# Patient Record
Sex: MALE | Race: WHITE | NOT HISPANIC OR LATINO | Employment: OTHER | URBAN - NONMETROPOLITAN AREA
[De-identification: names, ages, dates, MRNs, and addresses within clinical notes are randomized per-mention and may not be internally consistent; named-entity substitution may affect disease eponyms.]

---

## 2018-11-02 ENCOUNTER — APPOINTMENT (OUTPATIENT)
Dept: CT IMAGING | Facility: HOSPITAL | Age: 73
End: 2018-11-02

## 2018-11-02 ENCOUNTER — HOSPITAL ENCOUNTER (EMERGENCY)
Facility: HOSPITAL | Age: 73
Discharge: LEFT BEFORE DISCHARGE | End: 2018-11-02
Admitting: EMERGENCY MEDICINE

## 2018-11-02 ENCOUNTER — APPOINTMENT (OUTPATIENT)
Dept: GENERAL RADIOLOGY | Facility: HOSPITAL | Age: 73
End: 2018-11-02

## 2018-11-02 VITALS
WEIGHT: 210.6 LBS | BODY MASS INDEX: 33.06 KG/M2 | TEMPERATURE: 98.2 F | OXYGEN SATURATION: 92 % | DIASTOLIC BLOOD PRESSURE: 89 MMHG | RESPIRATION RATE: 16 BRPM | SYSTOLIC BLOOD PRESSURE: 149 MMHG | HEIGHT: 67 IN | HEART RATE: 100 BPM

## 2018-11-02 DIAGNOSIS — J18.9 PNEUMONIA OF BOTH LUNGS DUE TO INFECTIOUS ORGANISM, UNSPECIFIED PART OF LUNG: ICD-10-CM

## 2018-11-02 DIAGNOSIS — N20.1 URETEROLITHIASIS: Primary | ICD-10-CM

## 2018-11-02 LAB
ALBUMIN SERPL-MCNC: 3.7 G/DL (ref 3.5–5)
ALBUMIN/GLOB SERPL: 1.3 G/DL (ref 1.1–2.5)
ALP SERPL-CCNC: 87 U/L (ref 24–120)
ALT SERPL W P-5'-P-CCNC: <15 U/L (ref 0–54)
ANION GAP SERPL CALCULATED.3IONS-SCNC: 15 MMOL/L (ref 4–13)
AST SERPL-CCNC: 19 U/L (ref 7–45)
ATMOSPHERIC PRESS: 750 MMHG
BACTERIA UR QL AUTO: ABNORMAL /HPF
BASE EXCESS BLDV CALC-SCNC: -1.1 MMOL/L (ref 0–2)
BASOPHILS # BLD AUTO: 0.06 10*3/MM3 (ref 0–0.2)
BASOPHILS NFR BLD AUTO: 0.7 % (ref 0–2)
BDY SITE: ABNORMAL
BILIRUB SERPL-MCNC: 0.5 MG/DL (ref 0.1–1)
BILIRUB UR QL STRIP: NEGATIVE
BODY TEMPERATURE: 37 C
BUN BLD-MCNC: 23 MG/DL (ref 5–21)
BUN/CREAT SERPL: 15.1 (ref 7–25)
CALCIUM SPEC-SCNC: 9.2 MG/DL (ref 8.4–10.4)
CHLORIDE SERPL-SCNC: 102 MMOL/L (ref 98–110)
CLARITY UR: CLEAR
CO2 SERPL-SCNC: 23 MMOL/L (ref 24–31)
COLOR UR: YELLOW
CREAT BLD-MCNC: 1.52 MG/DL (ref 0.5–1.4)
DEPRECATED RDW RBC AUTO: 40.7 FL (ref 40–54)
EOSINOPHIL # BLD AUTO: 0.3 10*3/MM3 (ref 0–0.7)
EOSINOPHIL NFR BLD AUTO: 3.4 % (ref 0–4)
ERYTHROCYTE [DISTWIDTH] IN BLOOD BY AUTOMATED COUNT: 12.9 % (ref 12–15)
GFR SERPL CREATININE-BSD FRML MDRD: 45 ML/MIN/1.73
GLOBULIN UR ELPH-MCNC: 2.9 GM/DL
GLUCOSE BLD-MCNC: 296 MG/DL (ref 70–100)
GLUCOSE UR STRIP-MCNC: ABNORMAL MG/DL
HCO3 BLDV-SCNC: 24.4 MMOL/L (ref 22–28)
HCT VFR BLD AUTO: 39.9 % (ref 40–52)
HGB BLD-MCNC: 14.1 G/DL (ref 14–18)
HGB UR QL STRIP.AUTO: ABNORMAL
HOLD SPECIMEN: NORMAL
HOLD SPECIMEN: NORMAL
HYALINE CASTS UR QL AUTO: ABNORMAL /LPF
IMM GRANULOCYTES # BLD: 0.05 10*3/MM3 (ref 0–0.03)
IMM GRANULOCYTES NFR BLD: 0.6 % (ref 0–5)
KETONES UR QL STRIP: ABNORMAL
LEUKOCYTE ESTERASE UR QL STRIP.AUTO: ABNORMAL
LIPASE SERPL-CCNC: 57 U/L (ref 23–203)
LYMPHOCYTES # BLD AUTO: 0.85 10*3/MM3 (ref 0.72–4.86)
LYMPHOCYTES NFR BLD AUTO: 9.5 % (ref 15–45)
Lab: ABNORMAL
MCH RBC QN AUTO: 30.7 PG (ref 28–32)
MCHC RBC AUTO-ENTMCNC: 35.3 G/DL (ref 33–36)
MCV RBC AUTO: 86.9 FL (ref 82–95)
MODALITY: ABNORMAL
MONOCYTES # BLD AUTO: 0.65 10*3/MM3 (ref 0.19–1.3)
MONOCYTES NFR BLD AUTO: 7.3 % (ref 4–12)
NEUTROPHILS # BLD AUTO: 7.02 10*3/MM3 (ref 1.87–8.4)
NEUTROPHILS NFR BLD AUTO: 78.5 % (ref 39–78)
NITRITE UR QL STRIP: NEGATIVE
NRBC BLD MANUAL-RTO: 0 /100 WBC (ref 0–0)
PCO2 BLDV: 42.8 MM HG (ref 41–51)
PH BLDV: 7.37 PH UNITS (ref 7.32–7.42)
PH UR STRIP.AUTO: <=5 [PH] (ref 5–8)
PLATELET # BLD AUTO: 221 10*3/MM3 (ref 130–400)
PMV BLD AUTO: 9.9 FL (ref 6–12)
PO2 BLDV: 66.8 MM HG (ref 27–53)
POTASSIUM BLD-SCNC: 4.2 MMOL/L (ref 3.5–5.3)
PROT SERPL-MCNC: 6.6 G/DL (ref 6.3–8.7)
PROT UR QL STRIP: ABNORMAL
RBC # BLD AUTO: 4.59 10*6/MM3 (ref 4.8–5.9)
RBC # UR: ABNORMAL /HPF
REF LAB TEST METHOD: ABNORMAL
SAO2 % BLDCOV: 92.8 % (ref 45–75)
SODIUM BLD-SCNC: 140 MMOL/L (ref 135–145)
SP GR UR STRIP: >1.03 (ref 1–1.03)
SQUAMOUS #/AREA URNS HPF: ABNORMAL /HPF
UROBILINOGEN UR QL STRIP: ABNORMAL
VENTILATOR MODE: ABNORMAL
WBC NRBC COR # BLD: 8.93 10*3/MM3 (ref 4.8–10.8)
WBC UR QL AUTO: ABNORMAL /HPF
WHOLE BLOOD HOLD SPECIMEN: NORMAL
WHOLE BLOOD HOLD SPECIMEN: NORMAL

## 2018-11-02 PROCEDURE — 96375 TX/PRO/DX INJ NEW DRUG ADDON: CPT

## 2018-11-02 PROCEDURE — 83690 ASSAY OF LIPASE: CPT | Performed by: PHYSICIAN ASSISTANT

## 2018-11-02 PROCEDURE — 99284 EMERGENCY DEPT VISIT MOD MDM: CPT

## 2018-11-02 PROCEDURE — 74176 CT ABD & PELVIS W/O CONTRAST: CPT

## 2018-11-02 PROCEDURE — 85025 COMPLETE CBC W/AUTO DIFF WBC: CPT | Performed by: PHYSICIAN ASSISTANT

## 2018-11-02 PROCEDURE — 82803 BLOOD GASES ANY COMBINATION: CPT

## 2018-11-02 PROCEDURE — 87086 URINE CULTURE/COLONY COUNT: CPT | Performed by: EMERGENCY MEDICINE

## 2018-11-02 PROCEDURE — 71046 X-RAY EXAM CHEST 2 VIEWS: CPT

## 2018-11-02 PROCEDURE — 25010000002 ONDANSETRON PER 1 MG: Performed by: EMERGENCY MEDICINE

## 2018-11-02 PROCEDURE — 80053 COMPREHEN METABOLIC PANEL: CPT | Performed by: PHYSICIAN ASSISTANT

## 2018-11-02 PROCEDURE — 25010000002 HYDROMORPHONE PER 4 MG: Performed by: EMERGENCY MEDICINE

## 2018-11-02 PROCEDURE — 81001 URINALYSIS AUTO W/SCOPE: CPT | Performed by: EMERGENCY MEDICINE

## 2018-11-02 PROCEDURE — 96376 TX/PRO/DX INJ SAME DRUG ADON: CPT

## 2018-11-02 PROCEDURE — 96374 THER/PROPH/DIAG INJ IV PUSH: CPT

## 2018-11-02 RX ORDER — ONDANSETRON 2 MG/ML
4 INJECTION INTRAMUSCULAR; INTRAVENOUS ONCE
Status: COMPLETED | OUTPATIENT
Start: 2018-11-02 | End: 2018-11-02

## 2018-11-02 RX ORDER — ONDANSETRON 4 MG/1
4 TABLET, FILM COATED ORAL EVERY 8 HOURS PRN
Qty: 12 TABLET | Refills: 0 | Status: SHIPPED | OUTPATIENT
Start: 2018-11-02 | End: 2018-11-02

## 2018-11-02 RX ORDER — HYDROCODONE BITARTRATE AND ACETAMINOPHEN 7.5; 325 MG/1; MG/1
1 TABLET ORAL EVERY 8 HOURS PRN
Qty: 12 TABLET | Refills: 0 | Status: SHIPPED | OUTPATIENT
Start: 2018-11-02 | End: 2018-11-02 | Stop reason: HOSPADM

## 2018-11-02 RX ORDER — MEPERIDINE HYDROCHLORIDE 25 MG/ML
25 INJECTION INTRAMUSCULAR; INTRAVENOUS; SUBCUTANEOUS ONCE
Status: COMPLETED | OUTPATIENT
Start: 2018-11-02 | End: 2018-11-02

## 2018-11-02 RX ORDER — LEVOFLOXACIN 750 MG/1
750 TABLET ORAL DAILY
Qty: 7 TABLET | Refills: 0 | Status: ON HOLD | OUTPATIENT
Start: 2018-11-02 | End: 2019-03-11

## 2018-11-02 RX ORDER — ONDANSETRON 4 MG/1
4 TABLET, FILM COATED ORAL EVERY 8 HOURS PRN
Qty: 12 TABLET | Refills: 0 | Status: ON HOLD | OUTPATIENT
Start: 2018-11-02 | End: 2019-03-11

## 2018-11-02 RX ORDER — HYDROMORPHONE HYDROCHLORIDE 1 MG/ML
1 INJECTION, SOLUTION INTRAMUSCULAR; INTRAVENOUS; SUBCUTANEOUS ONCE
Status: COMPLETED | OUTPATIENT
Start: 2018-11-02 | End: 2018-11-02

## 2018-11-02 RX ADMIN — MEPERIDINE HYDROCHLORIDE 25 MG: 25 INJECTION INTRAMUSCULAR; INTRAVENOUS; SUBCUTANEOUS at 17:04

## 2018-11-02 RX ADMIN — SODIUM CHLORIDE 1000 ML: 9 INJECTION, SOLUTION INTRAVENOUS at 17:06

## 2018-11-02 RX ADMIN — ONDANSETRON HYDROCHLORIDE 4 MG: 2 INJECTION, SOLUTION INTRAMUSCULAR; INTRAVENOUS at 19:46

## 2018-11-02 RX ADMIN — ONDANSETRON HYDROCHLORIDE 4 MG: 2 INJECTION, SOLUTION INTRAMUSCULAR; INTRAVENOUS at 17:05

## 2018-11-02 RX ADMIN — HYDROMORPHONE HYDROCHLORIDE 1 MG: 1 INJECTION, SOLUTION INTRAMUSCULAR; INTRAVENOUS; SUBCUTANEOUS at 19:44

## 2018-11-03 NOTE — ED PROVIDER NOTES
Subjective   History of Present Illness  72-year-old male presents a chief complaint of right flank pain and right lower quadrant abdominal pain.  Patient 5 days ago was diagnosed with a distal ureter kidney stone is 3 mm.  Patient reports he has had severe pain up until yesterday.  His pain had resolved.  However, today his pain is gotten significantly worse.  He went to his primary care physician who told him he could not do anything for him and referred him to this emergency room for further evaluation.  Patient has not received a urology referral.  Patient also has a history of gastrointestinal bleeding.  Review of Systems   All other systems reviewed and are negative.      No past medical history on file.    No Known Allergies    No past surgical history on file.    No family history on file.    Social History     Social History   • Marital status:      Social History Main Topics   • Drug use: Unknown     Other Topics Concern   • Not on file           Objective   Physical Exam   Constitutional: He is oriented to person, place, and time. He appears well-developed and well-nourished.   HENT:   Head: Normocephalic and atraumatic.   Eyes: Pupils are equal, round, and reactive to light. EOM are normal.   Neck: Normal range of motion. Neck supple.   Cardiovascular: Normal rate and regular rhythm.    Pulmonary/Chest: Effort normal and breath sounds normal.   Abdominal: Soft. Bowel sounds are normal. He exhibits no distension. There is no tenderness.   Musculoskeletal: Normal range of motion.   Neurological: He is alert and oriented to person, place, and time. No cranial nerve deficit. Coordination normal.   Skin: Skin is warm and dry.   Psychiatric: He has a normal mood and affect. His behavior is normal.   Nursing note and vitals reviewed.      Procedures           ED Course        Labs Reviewed   URINALYSIS W/ CULTURE IF INDICATED - Abnormal; Notable for the following:        Result Value    Specific Gravity,  UA >1.030 (*)     Glucose, UA >=1000 mg/dL (3+) (*)     Ketones, UA Trace (*)     Blood, UA Moderate (2+) (*)     Protein, UA 30 mg/dL (1+) (*)     Leuk Esterase, UA Trace (*)     All other components within normal limits   COMPREHENSIVE METABOLIC PANEL - Abnormal; Notable for the following:     Glucose 296 (*)     BUN 23 (*)     Creatinine 1.52 (*)     CO2 23.0 (*)     eGFR Non  Amer 45 (*)     Anion Gap 15.0 (*)     All other components within normal limits    Narrative:     The MDRD GFR formula is only valid for adults with stable renal function between ages 18 and 70.   CBC WITH AUTO DIFFERENTIAL - Abnormal; Notable for the following:     RBC 4.59 (*)     Hematocrit 39.9 (*)     Neutrophil % 78.5 (*)     Lymphocyte % 9.5 (*)     Immature Grans, Absolute 0.05 (*)     All other components within normal limits   URINALYSIS, MICROSCOPIC ONLY - Abnormal; Notable for the following:     RBC, UA 13-20 (*)     WBC, UA 13-20 (*)     All other components within normal limits   BLOOD GAS, VENOUS - Abnormal; Notable for the following:     pO2, Venous 66.8 (*)     Base Excess, Venous -1.1 (*)     O2 Saturation, Venous 92.8 (*)     All other components within normal limits   LIPASE - Normal   URINE CULTURE   RAINBOW DRAW    Narrative:     The following orders were created for panel order [place] Draw.  Procedure                               Abnormality         Status                     ---------                               -----------         ------                     Light Blue Top[[id number]]                                   Final result               Green Top (Gel)[[id number]]                                  Final result               Lavender Top[[id number]]                                     Final result               Red Top[[id number]]                                          Final result                 Please view results for these tests on the individual orders.   BLOOD GAS, VENOUS   LIGHT BLUE TOP    GREEN TOP   LAVENDER TOP   RED TOP   CBC AND DIFFERENTIAL    Narrative:     The following orders were created for panel order CBC & Differential.  Procedure                               Abnormality         Status                     ---------                               -----------         ------                     CBC Auto Differential[[id number]]        Abnormal            Final result                 Please view results for these tests on the individual orders.     XR Chest 2 View   Final Result   1. RIGHT lower lobe pneumonia.   2. Questionable LEFT lower lobe pneumonia.           This report was finalized on 11/02/2018 21:25 by Dr. Bigg Stephenson MD.      CT Abdomen Pelvis Without Contrast   Final Result   1. 4 to 5 mm obstructive stone in the distal RIGHT ureter leading to   moderate hydronephrosis and hydroureter.   2. Bilateral nephrolithiasis.   3. Large hiatal hernia.       Images were made available for review at 6:45 PM on 11/2/2018.   This report was finalized on 11/02/2018 18:48 by Dr. Bigg Stephenson MD.          XR Chest 2 View   Final Result   1. RIGHT lower lobe pneumonia.   2. Questionable LEFT lower lobe pneumonia.           This report was finalized on 11/02/2018 21:25 by Dr. Bigg Stephenson MD.      CT Abdomen Pelvis Without Contrast   Final Result   1. 4 to 5 mm obstructive stone in the distal RIGHT ureter leading to   moderate hydronephrosis and hydroureter.   2. Bilateral nephrolithiasis.   3. Large hiatal hernia.       Images were made available for review at 6:45 PM on 11/2/2018.   This report was finalized on 11/02/2018 18:48 by Dr. Bigg Stephenson MD.                MDM  Number of Diagnoses or Management Options  Ureterolithiasis:   Diagnosis management comments: 5mm right distal ureterolithiasis, will offer urology referral     Hypoxia, pneumonia, tachycardia, patient is REFUSING to stay and has voiced understanding of risk of signing out AMA, including risk of DEATH.         Amount  and/or Complexity of Data Reviewed  Clinical lab tests: ordered and reviewed  Tests in the radiology section of CPT®: reviewed and ordered  Tests in the medicine section of CPT®: reviewed and ordered    Risk of Complications, Morbidity, and/or Mortality  Presenting problems: moderate  Diagnostic procedures: moderate  Management options: moderate    Patient Progress  Patient progress: stable        Final diagnoses:   Ureterolithiasis   Pneumonia of both lungs due to infectious organism, unspecified part of lung            Anderson Andres PA-C  11/02/18 2206       Anderson Andres PA-C  11/03/18 0020

## 2018-11-04 LAB — BACTERIA SPEC AEROBE CULT: NORMAL

## 2019-02-13 DIAGNOSIS — R06.09 DOE (DYSPNEA ON EXERTION): Primary | ICD-10-CM

## 2019-03-11 ENCOUNTER — HOSPITAL ENCOUNTER (INPATIENT)
Facility: HOSPITAL | Age: 74
LOS: 10 days | Discharge: NURSING, REHAB OR LONG-TERM CARE | End: 2019-03-22
Attending: INTERNAL MEDICINE | Admitting: THORACIC SURGERY (CARDIOTHORACIC VASCULAR SURGERY)

## 2019-03-11 ENCOUNTER — APPOINTMENT (OUTPATIENT)
Dept: GENERAL RADIOLOGY | Facility: HOSPITAL | Age: 74
End: 2019-03-11

## 2019-03-11 ENCOUNTER — APPOINTMENT (OUTPATIENT)
Dept: ULTRASOUND IMAGING | Facility: HOSPITAL | Age: 74
End: 2019-03-11

## 2019-03-11 DIAGNOSIS — Z74.09 IMPAIRED MOBILITY: ICD-10-CM

## 2019-03-11 DIAGNOSIS — I25.10 CORONARY ARTERY DISEASE INVOLVING NATIVE CORONARY ARTERY OF NATIVE HEART WITHOUT ANGINA PECTORIS: ICD-10-CM

## 2019-03-11 DIAGNOSIS — R06.09 DOE (DYSPNEA ON EXERTION): ICD-10-CM

## 2019-03-11 PROBLEM — Z72.0 SMOKELESS TOBACCO USE: Status: ACTIVE | Noted: 2019-03-11

## 2019-03-11 PROBLEM — Z79.4 TYPE 2 DIABETES MELLITUS WITH CIRCULATORY DISORDER, WITH LONG-TERM CURRENT USE OF INSULIN (HCC): Status: ACTIVE | Noted: 2019-03-11

## 2019-03-11 PROBLEM — Z86.73 HISTORY OF STROKE: Status: ACTIVE | Noted: 2019-03-11

## 2019-03-11 PROBLEM — I10 ESSENTIAL HYPERTENSION: Status: ACTIVE | Noted: 2019-03-11

## 2019-03-11 PROBLEM — E11.59 TYPE 2 DIABETES MELLITUS WITH CIRCULATORY DISORDER, WITH LONG-TERM CURRENT USE OF INSULIN (HCC): Status: ACTIVE | Noted: 2019-03-11

## 2019-03-11 LAB
ANION GAP SERPL CALCULATED.3IONS-SCNC: 10 MMOL/L (ref 4–13)
ARTERIAL PATENCY WRIST A: POSITIVE
ATMOSPHERIC PRESS: 760 MMHG
BASE EXCESS BLDA CALC-SCNC: 0.2 MMOL/L (ref 0–2)
BASOPHILS # BLD AUTO: 0.1 10*3/MM3 (ref 0–0.2)
BASOPHILS NFR BLD AUTO: 1.2 % (ref 0–2)
BDY SITE: NORMAL
BODY TEMPERATURE: 37 C
BUN BLD-MCNC: 18 MG/DL (ref 5–21)
BUN/CREAT SERPL: 18 (ref 7–25)
CALCIUM SPEC-SCNC: 9.1 MG/DL (ref 8.4–10.4)
CHLORIDE SERPL-SCNC: 101 MMOL/L (ref 98–110)
CO2 SERPL-SCNC: 29 MMOL/L (ref 24–31)
CREAT BLD-MCNC: 1 MG/DL (ref 0.5–1.4)
DEPRECATED RDW RBC AUTO: 43.8 FL (ref 40–54)
DEPRECATED RDW RBC AUTO: 45.1 FL (ref 40–54)
EOSINOPHIL # BLD AUTO: 1.04 10*3/MM3 (ref 0–0.7)
EOSINOPHIL NFR BLD AUTO: 12.6 % (ref 0–4)
ERYTHROCYTE [DISTWIDTH] IN BLOOD BY AUTOMATED COUNT: 13.8 % (ref 12–15)
ERYTHROCYTE [DISTWIDTH] IN BLOOD BY AUTOMATED COUNT: 13.9 % (ref 12–15)
GAS FLOW AIRWAY: 2 LPM
GFR SERPL CREATININE-BSD FRML MDRD: 73 ML/MIN/1.73
GLUCOSE BLD-MCNC: 256 MG/DL (ref 70–100)
GLUCOSE BLDC GLUCOMTR-MCNC: 244 MG/DL (ref 70–130)
GLUCOSE BLDC GLUCOMTR-MCNC: 264 MG/DL (ref 70–130)
HCO3 BLDA-SCNC: 25.7 MMOL/L (ref 20–26)
HCT VFR BLD AUTO: 32.3 % (ref 40–52)
HCT VFR BLD AUTO: 41.4 % (ref 40–52)
HGB BLD-MCNC: 11 G/DL (ref 14–18)
HGB BLD-MCNC: 13.9 G/DL (ref 14–18)
IMM GRANULOCYTES # BLD AUTO: 0.02 10*3/MM3 (ref 0–0.05)
IMM GRANULOCYTES NFR BLD AUTO: 0.2 % (ref 0–5)
LYMPHOCYTES # BLD AUTO: 1.5 10*3/MM3 (ref 0.72–4.86)
LYMPHOCYTES NFR BLD AUTO: 18.2 % (ref 15–45)
Lab: NORMAL
MCH RBC QN AUTO: 29.5 PG (ref 28–32)
MCH RBC QN AUTO: 30.6 PG (ref 28–32)
MCHC RBC AUTO-ENTMCNC: 33.6 G/DL (ref 33–36)
MCHC RBC AUTO-ENTMCNC: 34.1 G/DL (ref 33–36)
MCV RBC AUTO: 87.9 FL (ref 82–95)
MCV RBC AUTO: 89.7 FL (ref 82–95)
MODALITY: NORMAL
MONOCYTES # BLD AUTO: 0.56 10*3/MM3 (ref 0.19–1.3)
MONOCYTES NFR BLD AUTO: 6.8 % (ref 4–12)
NEUTROPHILS # BLD AUTO: 5.03 10*3/MM3 (ref 1.87–8.4)
NEUTROPHILS NFR BLD AUTO: 61 % (ref 39–78)
NRBC BLD AUTO-RTO: 0 /100 WBC (ref 0–0)
PCO2 BLDA: 44 MM HG (ref 35–45)
PH BLDA: 7.38 PH UNITS (ref 7.35–7.45)
PLATELET # BLD AUTO: 181 10*3/MM3 (ref 130–400)
PLATELET # BLD AUTO: 207 10*3/MM3 (ref 130–400)
PMV BLD AUTO: 10 FL (ref 6–12)
PMV BLD AUTO: 10 FL (ref 6–12)
PO2 BLDA: 84.5 MM HG (ref 83–108)
POTASSIUM BLD-SCNC: 4.5 MMOL/L (ref 3.5–5.3)
RBC # BLD AUTO: 3.6 10*6/MM3 (ref 4.8–5.9)
RBC # BLD AUTO: 4.71 10*6/MM3 (ref 4.8–5.9)
SAO2 % BLDCOA: 96.8 % (ref 94–99)
SODIUM BLD-SCNC: 140 MMOL/L (ref 135–145)
VENTILATOR MODE: NORMAL
WBC NRBC COR # BLD: 10.57 10*3/MM3 (ref 4.8–10.8)
WBC NRBC COR # BLD: 8.25 10*3/MM3 (ref 4.8–10.8)

## 2019-03-11 PROCEDURE — C1760 CLOSURE DEV, VASC: HCPCS | Performed by: INTERNAL MEDICINE

## 2019-03-11 PROCEDURE — 92920 PRQ TRLUML C ANGIOP 1ART&/BR: CPT | Performed by: INTERNAL MEDICINE

## 2019-03-11 PROCEDURE — 4A023N7 MEASUREMENT OF CARDIAC SAMPLING AND PRESSURE, LEFT HEART, PERCUTANEOUS APPROACH: ICD-10-PCS | Performed by: INTERNAL MEDICINE

## 2019-03-11 PROCEDURE — 84484 ASSAY OF TROPONIN QUANT: CPT | Performed by: INTERNAL MEDICINE

## 2019-03-11 PROCEDURE — 93880 EXTRACRANIAL BILAT STUDY: CPT | Performed by: SURGERY

## 2019-03-11 PROCEDURE — L1830 KO IMMOB CANVAS LONG PRE OTS: HCPCS | Performed by: INTERNAL MEDICINE

## 2019-03-11 PROCEDURE — B41F1ZZ FLUOROSCOPY OF RIGHT LOWER EXTREMITY ARTERIES USING LOW OSMOLAR CONTRAST: ICD-10-PCS | Performed by: INTERNAL MEDICINE

## 2019-03-11 PROCEDURE — 94760 N-INVAS EAR/PLS OXIMETRY 1: CPT

## 2019-03-11 PROCEDURE — 25010000002 DIPHENHYDRAMINE PER 50 MG: Performed by: INTERNAL MEDICINE

## 2019-03-11 PROCEDURE — S0260 H&P FOR SURGERY: HCPCS | Performed by: INTERNAL MEDICINE

## 2019-03-11 PROCEDURE — 93571 IV DOP VEL&/PRESS C FLO 1ST: CPT | Performed by: INTERNAL MEDICINE

## 2019-03-11 PROCEDURE — 25010000002 DOPAMINE PER 40 MG: Performed by: INTERNAL MEDICINE

## 2019-03-11 PROCEDURE — C1769 GUIDE WIRE: HCPCS | Performed by: INTERNAL MEDICINE

## 2019-03-11 PROCEDURE — 93458 L HRT ARTERY/VENTRICLE ANGIO: CPT | Performed by: INTERNAL MEDICINE

## 2019-03-11 PROCEDURE — 25010000002 BIVALIRUDIN 5 MG/ML: Performed by: INTERNAL MEDICINE

## 2019-03-11 PROCEDURE — 36600 WITHDRAWAL OF ARTERIAL BLOOD: CPT

## 2019-03-11 PROCEDURE — 74018 RADEX ABDOMEN 1 VIEW: CPT

## 2019-03-11 PROCEDURE — 36415 COLL VENOUS BLD VENIPUNCTURE: CPT | Performed by: INTERNAL MEDICINE

## 2019-03-11 PROCEDURE — 94799 UNLISTED PULMONARY SVC/PX: CPT

## 2019-03-11 PROCEDURE — 80048 BASIC METABOLIC PNL TOTAL CA: CPT | Performed by: INTERNAL MEDICINE

## 2019-03-11 PROCEDURE — 93880 EXTRACRANIAL BILAT STUDY: CPT

## 2019-03-11 PROCEDURE — 82803 BLOOD GASES ANY COMBINATION: CPT

## 2019-03-11 PROCEDURE — 99153 MOD SED SAME PHYS/QHP EA: CPT | Performed by: INTERNAL MEDICINE

## 2019-03-11 PROCEDURE — 99152 MOD SED SAME PHYS/QHP 5/>YRS: CPT | Performed by: INTERNAL MEDICINE

## 2019-03-11 PROCEDURE — 93005 ELECTROCARDIOGRAM TRACING: CPT | Performed by: INTERNAL MEDICINE

## 2019-03-11 PROCEDURE — C1887 CATHETER, GUIDING: HCPCS | Performed by: INTERNAL MEDICINE

## 2019-03-11 PROCEDURE — 25010000002 FENTANYL CITRATE (PF) 100 MCG/2ML SOLUTION: Performed by: INTERNAL MEDICINE

## 2019-03-11 PROCEDURE — G0378 HOSPITAL OBSERVATION PER HR: HCPCS

## 2019-03-11 PROCEDURE — 99204 OFFICE O/P NEW MOD 45 MIN: CPT | Performed by: THORACIC SURGERY (CARDIOTHORACIC VASCULAR SURGERY)

## 2019-03-11 PROCEDURE — 0 IOPAMIDOL PER 1 ML: Performed by: INTERNAL MEDICINE

## 2019-03-11 PROCEDURE — 85025 COMPLETE CBC W/AUTO DIFF WBC: CPT | Performed by: INTERNAL MEDICINE

## 2019-03-11 PROCEDURE — B2111ZZ FLUOROSCOPY OF MULTIPLE CORONARY ARTERIES USING LOW OSMOLAR CONTRAST: ICD-10-PCS | Performed by: INTERNAL MEDICINE

## 2019-03-11 PROCEDURE — 82962 GLUCOSE BLOOD TEST: CPT

## 2019-03-11 PROCEDURE — C1894 INTRO/SHEATH, NON-LASER: HCPCS | Performed by: INTERNAL MEDICINE

## 2019-03-11 PROCEDURE — 71045 X-RAY EXAM CHEST 1 VIEW: CPT

## 2019-03-11 PROCEDURE — 92928 PRQ TCAT PLMT NTRAC ST 1 LES: CPT | Performed by: INTERNAL MEDICINE

## 2019-03-11 PROCEDURE — 63710000001 INSULIN DETEMIR PER 5 UNITS: Performed by: INTERNAL MEDICINE

## 2019-03-11 PROCEDURE — 85027 COMPLETE CBC AUTOMATED: CPT | Performed by: INTERNAL MEDICINE

## 2019-03-11 PROCEDURE — 25010000002 HEPARIN (PORCINE) PER 1000 UNITS: Performed by: INTERNAL MEDICINE

## 2019-03-11 PROCEDURE — 25010000002 MIDAZOLAM PER 1 MG: Performed by: INTERNAL MEDICINE

## 2019-03-11 PROCEDURE — 25010000002 ONDANSETRON PER 1 MG: Performed by: INTERNAL MEDICINE

## 2019-03-11 PROCEDURE — B2151ZZ FLUOROSCOPY OF LEFT HEART USING LOW OSMOLAR CONTRAST: ICD-10-PCS | Performed by: INTERNAL MEDICINE

## 2019-03-11 PROCEDURE — 25010000002 ADENOSINE (DIAGNOSTIC) PER 30 MG: Performed by: INTERNAL MEDICINE

## 2019-03-11 RX ORDER — FUROSEMIDE 40 MG/1
40 TABLET ORAL DAILY
Status: DISCONTINUED | OUTPATIENT
Start: 2019-03-11 | End: 2019-03-14 | Stop reason: HOSPADM

## 2019-03-11 RX ORDER — ASPIRIN 325 MG
325 TABLET ORAL DAILY
Status: DISCONTINUED | OUTPATIENT
Start: 2019-03-12 | End: 2019-03-11

## 2019-03-11 RX ORDER — ATROPINE SULFATE 1 MG/ML
INJECTION, SOLUTION INTRAMUSCULAR; INTRAVENOUS; SUBCUTANEOUS
Status: COMPLETED | OUTPATIENT
Start: 2019-03-11 | End: 2019-03-11

## 2019-03-11 RX ORDER — SODIUM CHLORIDE 9 MG/ML
50 INJECTION, SOLUTION INTRAVENOUS CONTINUOUS
Status: DISPENSED | OUTPATIENT
Start: 2019-03-11 | End: 2019-03-12

## 2019-03-11 RX ORDER — DOPAMINE HYDROCHLORIDE 160 MG/100ML
INJECTION, SOLUTION INTRAVENOUS
Status: COMPLETED | OUTPATIENT
Start: 2019-03-11 | End: 2019-03-11

## 2019-03-11 RX ORDER — INSULIN GLARGINE 100 [IU]/ML
34 INJECTION, SOLUTION SUBCUTANEOUS NIGHTLY
COMMUNITY

## 2019-03-11 RX ORDER — MIDAZOLAM HYDROCHLORIDE 1 MG/ML
INJECTION INTRAMUSCULAR; INTRAVENOUS AS NEEDED
Status: DISCONTINUED | OUTPATIENT
Start: 2019-03-11 | End: 2019-03-11 | Stop reason: HOSPADM

## 2019-03-11 RX ORDER — FUROSEMIDE 40 MG/1
40 TABLET ORAL DAILY
COMMUNITY

## 2019-03-11 RX ORDER — ROPINIROLE 4 MG/1
4 TABLET, FILM COATED ORAL NIGHTLY
Status: DISCONTINUED | OUTPATIENT
Start: 2019-03-11 | End: 2019-03-14 | Stop reason: HOSPADM

## 2019-03-11 RX ORDER — LANSOPRAZOLE 30 MG/1
30 CAPSULE, DELAYED RELEASE ORAL 2 TIMES DAILY
COMMUNITY

## 2019-03-11 RX ORDER — RAMIPRIL 10 MG/1
10 CAPSULE ORAL DAILY
COMMUNITY

## 2019-03-11 RX ORDER — RAMIPRIL 5 MG/1
10 CAPSULE ORAL DAILY
Status: DISCONTINUED | OUTPATIENT
Start: 2019-03-11 | End: 2019-03-14 | Stop reason: HOSPADM

## 2019-03-11 RX ORDER — ESCITALOPRAM OXALATE 20 MG/1
20 TABLET ORAL DAILY
COMMUNITY

## 2019-03-11 RX ORDER — ROPINIROLE 4 MG/1
4 TABLET, FILM COATED ORAL NIGHTLY
COMMUNITY

## 2019-03-11 RX ORDER — PANTOPRAZOLE SODIUM 40 MG/1
40 TABLET, DELAYED RELEASE ORAL
Status: DISCONTINUED | OUTPATIENT
Start: 2019-03-12 | End: 2019-03-14 | Stop reason: HOSPADM

## 2019-03-11 RX ORDER — ATORVASTATIN CALCIUM 40 MG/1
40 TABLET, FILM COATED ORAL NIGHTLY
Status: ON HOLD | COMMUNITY
End: 2019-03-22 | Stop reason: SDUPTHER

## 2019-03-11 RX ORDER — GLIPIZIDE 10 MG/1
10 TABLET ORAL DAILY
Status: DISCONTINUED | OUTPATIENT
Start: 2019-03-12 | End: 2019-03-14 | Stop reason: HOSPADM

## 2019-03-11 RX ORDER — LIDOCAINE HYDROCHLORIDE 20 MG/ML
INJECTION, SOLUTION INFILTRATION; PERINEURAL AS NEEDED
Status: DISCONTINUED | OUTPATIENT
Start: 2019-03-11 | End: 2019-03-11 | Stop reason: HOSPADM

## 2019-03-11 RX ORDER — ESCITALOPRAM OXALATE 10 MG/1
20 TABLET ORAL DAILY
Status: DISCONTINUED | OUTPATIENT
Start: 2019-03-12 | End: 2019-03-14 | Stop reason: HOSPADM

## 2019-03-11 RX ORDER — CARVEDILOL 3.12 MG/1
3.12 TABLET ORAL 2 TIMES DAILY WITH MEALS
Status: DISCONTINUED | OUTPATIENT
Start: 2019-03-11 | End: 2019-03-14 | Stop reason: HOSPADM

## 2019-03-11 RX ORDER — SODIUM CHLORIDE 9 MG/ML
50 INJECTION, SOLUTION INTRAVENOUS CONTINUOUS
Status: DISCONTINUED | OUTPATIENT
Start: 2019-03-11 | End: 2019-03-14 | Stop reason: HOSPADM

## 2019-03-11 RX ORDER — ASPIRIN 325 MG
325 TABLET ORAL DAILY
Status: ON HOLD | COMMUNITY
End: 2019-03-22 | Stop reason: SDUPTHER

## 2019-03-11 RX ORDER — FENTANYL CITRATE 50 UG/ML
INJECTION, SOLUTION INTRAMUSCULAR; INTRAVENOUS AS NEEDED
Status: DISCONTINUED | OUTPATIENT
Start: 2019-03-11 | End: 2019-03-11 | Stop reason: HOSPADM

## 2019-03-11 RX ORDER — DIPHENHYDRAMINE HYDROCHLORIDE 50 MG/ML
INJECTION INTRAMUSCULAR; INTRAVENOUS AS NEEDED
Status: DISCONTINUED | OUTPATIENT
Start: 2019-03-11 | End: 2019-03-11 | Stop reason: HOSPADM

## 2019-03-11 RX ORDER — ASPIRIN 81 MG/1
81 TABLET, CHEWABLE ORAL DAILY
Status: DISCONTINUED | OUTPATIENT
Start: 2019-03-12 | End: 2019-03-14 | Stop reason: HOSPADM

## 2019-03-11 RX ORDER — CARVEDILOL 3.12 MG/1
3.12 TABLET ORAL 2 TIMES DAILY WITH MEALS
COMMUNITY
End: 2019-03-22 | Stop reason: HOSPADM

## 2019-03-11 RX ORDER — ONDANSETRON 2 MG/ML
4 INJECTION INTRAMUSCULAR; INTRAVENOUS EVERY 6 HOURS PRN
Status: DISCONTINUED | OUTPATIENT
Start: 2019-03-11 | End: 2019-03-14 | Stop reason: HOSPADM

## 2019-03-11 RX ORDER — FERROUS SULFATE 325(65) MG
325 TABLET ORAL 2 TIMES DAILY
COMMUNITY

## 2019-03-11 RX ORDER — FERROUS SULFATE 325(65) MG
325 TABLET ORAL 2 TIMES DAILY
Status: DISCONTINUED | OUTPATIENT
Start: 2019-03-11 | End: 2019-03-14 | Stop reason: HOSPADM

## 2019-03-11 RX ORDER — GLIPIZIDE 10 MG/1
10 TABLET ORAL DAILY
COMMUNITY

## 2019-03-11 RX ORDER — ATORVASTATIN CALCIUM 40 MG/1
40 TABLET, FILM COATED ORAL NIGHTLY
Status: DISCONTINUED | OUTPATIENT
Start: 2019-03-11 | End: 2019-03-14 | Stop reason: HOSPADM

## 2019-03-11 RX ADMIN — ONDANSETRON HYDROCHLORIDE 4 MG: 2 SOLUTION INTRAMUSCULAR; INTRAVENOUS at 23:24

## 2019-03-11 RX ADMIN — CARVEDILOL 3.12 MG: 3.12 TABLET, FILM COATED ORAL at 18:49

## 2019-03-11 RX ADMIN — INSULIN DETEMIR 34 UNITS: 100 INJECTION, SOLUTION SUBCUTANEOUS at 20:59

## 2019-03-11 RX ADMIN — RAMIPRIL 10 MG: 5 CAPSULE ORAL at 20:19

## 2019-03-11 RX ADMIN — FERROUS SULFATE TAB 325 MG (65 MG ELEMENTAL FE) 325 MG: 325 (65 FE) TAB at 20:19

## 2019-03-11 RX ADMIN — SODIUM CHLORIDE 50 ML/HR: 9 INJECTION, SOLUTION INTRAVENOUS at 16:08

## 2019-03-11 RX ADMIN — SODIUM CHLORIDE 50 ML/HR: 9 INJECTION, SOLUTION INTRAVENOUS at 18:56

## 2019-03-11 RX ADMIN — DOPAMINE HYDROCHLORIDE 2 MCG/KG/MIN: 160 INJECTION, SOLUTION INTRAVENOUS at 23:38

## 2019-03-11 RX ADMIN — ROPINIROLE 4 MG: 4 TABLET ORAL at 20:19

## 2019-03-11 RX ADMIN — ATORVASTATIN CALCIUM 40 MG: 40 TABLET, FILM COATED ORAL at 20:19

## 2019-03-11 RX ADMIN — ATROPINE SULFATE 1 MG: 1 INJECTION, SOLUTION INTRAMUSCULAR; INTRAVENOUS; SUBCUTANEOUS at 23:34

## 2019-03-11 NOTE — H&P
LOS: 0 days   Patient Care Team:  Joshua Griggs MD as PCP - General (Family Medicine)    Chief Complaint:   ENNIS (dyspnea on exertion)      Subjective    Moderate  exertional shortness of breath on exertion relieved with rest  No significant cough or wheezing  Going on for several months or longer    No palpitations  No associated chest pain  No significant pedal edema    No fever or chills  No significant expectoration    No hemoptysis  No presyncope or syncope      Review of Systems     Constitutional: No chills   Has fatigue   No fever.     HENT: Negative.    Eyes: Negative.      Respiratory: Negative for cough,   No chest wall soreness,   Shortness of breath,   no wheezing, no stridor.      Cardiovascular: Negative for chest pain,   No palpitations   No significant  leg swelling.     Gastrointestinal: Negative for abdominal distention,  No abdominal pain,   No blood in stool,   No constipation,   No diarrhea,   No nausea   No vomiting.     Endocrine: Negative.    Genitourinary: Negative for difficulty urinating, dysuria, flank pain and hematuria.     Musculoskeletal: Negative.    Skin: Negative for rash and wound.   Allergic/Immunologic: Negative.      Neurological: Negative for dizziness, syncope, weakness,   No light-headedness  No  headaches.     Hematological: Does not bruise/bleed easily.     Psychiatric/Behavioral: Negative for agitation or behavioral problems,   No confusion,   the patient is  nervous/anxious.       History:   No past medical history on file.  No past surgical history on file.  Social History     Socioeconomic History   • Marital status:      Spouse name: Not on file   • Number of children: Not on file   • Years of education: Not on file   • Highest education level: Not on file   Social Needs   • Financial resource strain: Not on file   • Food insecurity - worry: Not on file   • Food insecurity - inability: Not on file   • Transportation needs - medical: Not on file  "  • Transportation needs - non-medical: Not on file   Occupational History   • Not on file   Tobacco Use   • Smoking status: Not on file   Substance and Sexual Activity   • Alcohol use: Not on file   • Drug use: Not on file   • Sexual activity: Not on file   Other Topics Concern   • Not on file   Social History Narrative   • Not on file     No family history on file.    Labs:  WBC WBC   Date Value Ref Range Status   03/11/2019 8.25 4.80 - 10.80 10*3/mm3 Final      HGB Hemoglobin   Date Value Ref Range Status   03/11/2019 13.9 (L) 14.0 - 18.0 g/dL Final      HCT Hematocrit   Date Value Ref Range Status   03/11/2019 41.4 40.0 - 52.0 % Final      Platelets Platelets   Date Value Ref Range Status   03/11/2019 207 130 - 400 10*3/mm3 Final      MCV MCV   Date Value Ref Range Status   03/11/2019 87.9 82.0 - 95.0 fL Final        Results from last 7 days   Lab Units 03/11/19  1235   SODIUM mmol/L 140   POTASSIUM mmol/L 4.5   CHLORIDE mmol/L 101   CO2 mmol/L 29.0   BUN mg/dL 18   CREATININE mg/dL 1.00   CALCIUM mg/dL 9.1   GLUCOSE mg/dL 256*   No results found for: CKTOTAL, CKMB, CKMBINDEX, TROPONINI, TROPONINT  PT/INR:  No results found for: PROTIME/No results found for: INR    Imaging Results (last 72 hours)     ** No results found for the last 72 hours. **          Objective     No Known Allergies    Medication Review: Performed  Current Facility-Administered Medications   Medication Dose Route Frequency Provider Last Rate Last Dose   • sodium chloride 0.9 % infusion  50 mL/hr Intravenous Continuous Markell Obrien MD           Vital Sign Min/Max for last 24 hours  Temp  Min: 98.4 °F (36.9 °C)  Max: 98.4 °F (36.9 °C)   BP  Min: 153/88  Max: 153/88   Pulse  Min: 70  Max: 70   Resp  Min: 22  Max: 22   SpO2  Min: 98 %  Max: 98 %   No Data Recorded   Weight  Min: 90.7 kg (200 lb)  Max: 90.7 kg (200 lb)     Flowsheet Rows      First Filed Value   Admission Height  177.8 cm (70\") Documented at 03/11/2019 1227   Admission Weight  " 90.7 kg (200 lb) Documented at 03/11/2019 1227               Physical Exam:    General Appearance: Awake, alert, in no acute distress  Eyes: Pupils equal and reactive    Ears: Appear intact with no abnormalities noted  Nose: Nares normal, no drainage  Neck: supple, trachea midline, no carotid bruit and no JVD  Back: no kyphosis present,    Lungs: respirations regular, respirations even and respirations unlabored    Heart: normal S1, S2,  2/6 pansystolic murmur in the left sternal border,  no rub and no click    Abdomen: normal bowel sounds, no tenderness   Skin: no bleeding, bruising or rash  Extremities: no cyanosis  Psychiatric/Behavioral: Negative for agitation, behavioral problems, confusion, the patient does  appear to be nervous/anxious.          Results Review:   I reviewed the patient's new clinical results.  I reviewed the patient's new imaging results and agree with the interpretation.  I reviewed the patient's other test results and agree with the interpretation  I personally viewed and interpreted the patient's EKG/Telemetry data  Discussed with patient    Reviewed available prior notes, consults, prior visits, laboratory findings, radiology and cardiology relevant reports. Updated chart as applicable. I have reviewed the patient's medical history in detail and updated the computerized patient record as relevant.      Updated patient regarding any new or relevant abnormalities on review of records or any new findings on physical exam. Mentioned to patient about purpose of visit and desirable health short and long term goals and objectives.     Assessment/Plan       ENNIS (dyspnea on exertion)  congestive heart failure   Severe  left ventricular dysfunction       Plan      Recommend cardiac catheterization, selective coronary angiography, left ventriculography and percutaneous coronary intervention with application of arteriotomy hemostatic closure device.    I discussed cardiac catheterization, the  procedure, risks (including bleeding, infection, vascular damage [including minor oozing, bruising, bleeding, and up to and including but not limited to the need for vascular surgery, emergency cardiothoracic surgery, contrast reaction, renal failure, respiratory failure, heart attack, stroke, arrhythmia and even death), benefits, and alternatives and the patient has voiced understanding and is willing to proceed.    Adequate pre-hydration and post cardiac catheterization hydration.  Premedications as required and indicated for cardiac catheterization.    No contraindication to drug eluting stent placement if required  Further recommendations pending results of cardiac catheterization      Supportive care  Telemetry  Optimal medical therapy    Deep vein thrombosis prophylaxis/precautions  Appropriate diet, fluid, sodium, caffeine, stimulants intake     Questions were encouraged, asked and answered to the patient's  understanding and satisfaction.    Compliance to diet and medications   Avoid NSAIDS    Markell Obrien MD  03/11/19  1:24 PM    EMR Dragon/Transcription was used to dictate part of this note Recommend cardiac catheterization, selective coronary angiography, left ventriculography and percutaneous coronary intervention with application of arteriotomy hemostatic closure device.    I discussed cardiac catheterization, the procedure, risks (including bleeding, infection, vascular damage [including minor oozing, bruising, bleeding, and up to and including but not limited to the need for vascular surgery, emergency cardiothoracic surgery, contrast reaction, renal failure, respiratory failure, heart attack, stroke, arrhythmia and even death), benefits, and alternatives and the patient has voiced understanding and is willing to proceed.    Adequate pre-hydration and post cardiac catheterization hydration.  Premedications as required and indicated for cardiac catheterization.    No contraindication to drug eluting  stent placement if required  Further recommendations pending results of cardiac catheterization

## 2019-03-11 NOTE — PLAN OF CARE
Problem: Patient Care Overview  Goal: Plan of Care Review  Outcome: Ongoing (interventions implemented as appropriate)   03/11/19 2725   Coping/Psychosocial   Plan of Care Reviewed With patient   Plan of Care Review   Progress no change   OTHER   Outcome Summary Patient had scheduled heart cath today. R groin C/D/I, safeguard in place. CT consult. NSR on tele. No c/o pain. Continue to monitor.        Problem: Cardiac Catheterization (Diagnostic/Interventional) (Adult)  Goal: Signs and Symptoms of Listed Potential Problems Will be Absent, Minimized or Managed (Cardiac Catheterization)  Outcome: Ongoing (interventions implemented as appropriate)

## 2019-03-12 ENCOUNTER — APPOINTMENT (OUTPATIENT)
Dept: PULMONOLOGY | Facility: HOSPITAL | Age: 74
End: 2019-03-12

## 2019-03-12 ENCOUNTER — APPOINTMENT (OUTPATIENT)
Dept: CT IMAGING | Facility: HOSPITAL | Age: 74
End: 2019-03-12

## 2019-03-12 ENCOUNTER — APPOINTMENT (OUTPATIENT)
Dept: CARDIOLOGY | Facility: HOSPITAL | Age: 74
End: 2019-03-12

## 2019-03-12 LAB
ANION GAP SERPL CALCULATED.3IONS-SCNC: 9 MMOL/L (ref 4–13)
ARTERIAL PATENCY WRIST A: POSITIVE
ARTICHOKE IGE QN: 69 MG/DL (ref 0–99)
ATMOSPHERIC PRESS: 756 MMHG
BASE EXCESS BLDA CALC-SCNC: 1.3 MMOL/L (ref 0–2)
BDY SITE: ABNORMAL
BODY TEMPERATURE: 37 C
BUN BLD-MCNC: 18 MG/DL (ref 5–21)
BUN/CREAT SERPL: 13.8 (ref 7–25)
CALCIUM SPEC-SCNC: 9 MG/DL (ref 8.4–10.4)
CHLORIDE SERPL-SCNC: 103 MMOL/L (ref 98–110)
CHOLEST SERPL-MCNC: 109 MG/DL (ref 130–200)
CO2 SERPL-SCNC: 28 MMOL/L (ref 24–31)
CREAT BLD-MCNC: 1.3 MG/DL (ref 0.5–1.4)
DEPRECATED RDW RBC AUTO: 45.1 FL (ref 40–54)
ERYTHROCYTE [DISTWIDTH] IN BLOOD BY AUTOMATED COUNT: 14 % (ref 12–15)
GFR SERPL CREATININE-BSD FRML MDRD: 54 ML/MIN/1.73
GLUCOSE BLD-MCNC: 269 MG/DL (ref 70–100)
GLUCOSE BLDC GLUCOMTR-MCNC: 145 MG/DL (ref 70–130)
GLUCOSE BLDC GLUCOMTR-MCNC: 207 MG/DL (ref 70–130)
GLUCOSE BLDC GLUCOMTR-MCNC: 213 MG/DL (ref 70–130)
GLUCOSE BLDC GLUCOMTR-MCNC: 293 MG/DL (ref 70–130)
HBA1C MFR BLD: 8.4 %
HCO3 BLDA-SCNC: 26.4 MMOL/L (ref 20–26)
HCT VFR BLD AUTO: 39.5 % (ref 40–52)
HDLC SERPL-MCNC: 29 MG/DL
HGB BLD-MCNC: 13 G/DL (ref 14–18)
LDLC/HDLC SERPL: 1.88 {RATIO}
Lab: ABNORMAL
MCH RBC QN AUTO: 29.1 PG (ref 28–32)
MCHC RBC AUTO-ENTMCNC: 32.9 G/DL (ref 33–36)
MCV RBC AUTO: 88.6 FL (ref 82–95)
MODALITY: ABNORMAL
PCO2 BLDA: 43 MM HG (ref 35–45)
PH BLDA: 7.4 PH UNITS (ref 7.35–7.45)
PLATELET # BLD AUTO: 226 10*3/MM3 (ref 130–400)
PMV BLD AUTO: 10.1 FL (ref 6–12)
PO2 BLDA: 64.4 MM HG (ref 83–108)
POTASSIUM BLD-SCNC: 5 MMOL/L (ref 3.5–5.3)
RBC # BLD AUTO: 4.46 10*6/MM3 (ref 4.8–5.9)
SAO2 % BLDCOA: 93.4 % (ref 94–99)
SODIUM BLD-SCNC: 140 MMOL/L (ref 135–145)
TRIGL SERPL-MCNC: 127 MG/DL (ref 0–149)
TROPONIN I SERPL-MCNC: 0.04 NG/ML (ref 0–0.03)
VENTILATOR MODE: ABNORMAL
WBC NRBC COR # BLD: 10.41 10*3/MM3 (ref 4.8–10.8)

## 2019-03-12 PROCEDURE — 94010 BREATHING CAPACITY TEST: CPT | Performed by: INTERNAL MEDICINE

## 2019-03-12 PROCEDURE — 93306 TTE W/DOPPLER COMPLETE: CPT | Performed by: INTERNAL MEDICINE

## 2019-03-12 PROCEDURE — 80061 LIPID PANEL: CPT | Performed by: INTERNAL MEDICINE

## 2019-03-12 PROCEDURE — 85027 COMPLETE CBC AUTOMATED: CPT | Performed by: INTERNAL MEDICINE

## 2019-03-12 PROCEDURE — 94799 UNLISTED PULMONARY SVC/PX: CPT

## 2019-03-12 PROCEDURE — 93306 TTE W/DOPPLER COMPLETE: CPT

## 2019-03-12 PROCEDURE — 82962 GLUCOSE BLOOD TEST: CPT

## 2019-03-12 PROCEDURE — 82803 BLOOD GASES ANY COMBINATION: CPT

## 2019-03-12 PROCEDURE — 71275 CT ANGIOGRAPHY CHEST: CPT

## 2019-03-12 PROCEDURE — 63710000001 INSULIN DETEMIR PER 5 UNITS: Performed by: INTERNAL MEDICINE

## 2019-03-12 PROCEDURE — 93005 ELECTROCARDIOGRAM TRACING: CPT | Performed by: INTERNAL MEDICINE

## 2019-03-12 PROCEDURE — 93010 ELECTROCARDIOGRAM REPORT: CPT | Performed by: INTERNAL MEDICINE

## 2019-03-12 PROCEDURE — 99233 SBSQ HOSP IP/OBS HIGH 50: CPT | Performed by: INTERNAL MEDICINE

## 2019-03-12 PROCEDURE — 25010000002 PERFLUTREN 6.52 MG/ML SUSPENSION: Performed by: INTERNAL MEDICINE

## 2019-03-12 PROCEDURE — 94010 BREATHING CAPACITY TEST: CPT

## 2019-03-12 PROCEDURE — 80048 BASIC METABOLIC PNL TOTAL CA: CPT | Performed by: INTERNAL MEDICINE

## 2019-03-12 PROCEDURE — 94760 N-INVAS EAR/PLS OXIMETRY 1: CPT

## 2019-03-12 PROCEDURE — 0 IOPAMIDOL PER 1 ML: Performed by: INTERNAL MEDICINE

## 2019-03-12 PROCEDURE — 36600 WITHDRAWAL OF ARTERIAL BLOOD: CPT

## 2019-03-12 PROCEDURE — 83036 HEMOGLOBIN GLYCOSYLATED A1C: CPT | Performed by: INTERNAL MEDICINE

## 2019-03-12 RX ORDER — DOPAMINE HYDROCHLORIDE 160 MG/100ML
2-20 INJECTION, SOLUTION INTRAVENOUS
Status: DISCONTINUED | OUTPATIENT
Start: 2019-03-12 | End: 2019-03-14 | Stop reason: HOSPADM

## 2019-03-12 RX ADMIN — ASPIRIN 81 MG: 81 TABLET, CHEWABLE ORAL at 08:28

## 2019-03-12 RX ADMIN — IOPAMIDOL 150 ML: 755 INJECTION, SOLUTION INTRAVENOUS at 15:56

## 2019-03-12 RX ADMIN — RAMIPRIL 10 MG: 5 CAPSULE ORAL at 20:11

## 2019-03-12 RX ADMIN — INSULIN DETEMIR 34 UNITS: 100 INJECTION, SOLUTION SUBCUTANEOUS at 20:17

## 2019-03-12 RX ADMIN — ROPINIROLE 4 MG: 4 TABLET ORAL at 20:12

## 2019-03-12 RX ADMIN — FERROUS SULFATE TAB 325 MG (65 MG ELEMENTAL FE) 325 MG: 325 (65 FE) TAB at 08:29

## 2019-03-12 RX ADMIN — PANTOPRAZOLE SODIUM 40 MG: 40 TABLET, DELAYED RELEASE ORAL at 05:35

## 2019-03-12 RX ADMIN — FUROSEMIDE 40 MG: 40 TABLET ORAL at 08:29

## 2019-03-12 RX ADMIN — PERFLUTREN 8.48 MG: 6.52 INJECTION, SUSPENSION INTRAVENOUS at 14:17

## 2019-03-12 RX ADMIN — CARVEDILOL 3.12 MG: 3.12 TABLET, FILM COATED ORAL at 18:25

## 2019-03-12 RX ADMIN — GLIPIZIDE 10 MG: 10 TABLET ORAL at 08:29

## 2019-03-12 RX ADMIN — ATORVASTATIN CALCIUM 40 MG: 40 TABLET, FILM COATED ORAL at 20:12

## 2019-03-12 RX ADMIN — ESCITSLOPRAM 20 MG: 10 TABLET ORAL at 08:28

## 2019-03-12 RX ADMIN — FERROUS SULFATE TAB 325 MG (65 MG ELEMENTAL FE) 325 MG: 325 (65 FE) TAB at 20:12

## 2019-03-12 NOTE — PROGRESS NOTES
LOS: 0 days   Patient Care Team:  Joshua Griggs MD as PCP - General (Family Medicine)    Chief Complaint:   ENNIS (dyspnea on exertion)    Coronary artery disease involving native coronary artery of native heart without angina pectoris    History of stroke    Type 2 diabetes mellitus with circulatory disorder, with long-term current use of insulin (CMS/[facility])    Essential hypertension    Smokeless tobacco use    Shortness of breath    Subjective  Feeling  better  No chest pain   No excessive shortness of breath  No new complaints  Anxious  Generalized weakness  Easy fatigability  CT surgery has seen this patient  Was moved overnight to CCU due to bradycardia and hypotension  Received low-dose dopamine  This has been discontinued  Vitals are stable now  Lab parameters have been reviewed  2 of his nurses providing excellent care of him out by the bedside  Wife is there      Interval History: Improved overall    Patient Complaints: No chief complaint on file.      Telemetry: no malignant arrhythmia. No significant pauses.    Review of Systems     Constitutional: No chills   Has fatigue   No fever.     HENT: Negative.    Eyes: Negative.      Respiratory: Negative for cough,   No chest wall soreness,   Shortness of breath,   no wheezing, no stridor.      Cardiovascular: Negative for chest pain,   No palpitations   No significant  leg swelling.     Gastrointestinal: Negative for abdominal distention,  No abdominal pain,   No blood in stool,   No constipation,   No diarrhea,   No nausea   No vomiting.     Endocrine: Negative.    Genitourinary: Negative for difficulty urinating, dysuria, flank pain and hematuria.     Musculoskeletal: Negative.    Skin: Negative for rash and wound.   Allergic/Immunologic: Negative.      Neurological: Negative for dizziness, syncope, weakness,   No light-headedness  No  headaches.     Hematological: Does not bruise/bleed easily.     Psychiatric/Behavioral: Negative for agitation or  behavioral problems,   No confusion,   the patient is  nervous/anxious.       History:   Past Medical History:   Diagnosis Date   • CHF (congestive heart failure) (CMS/[facility])    • Diabetes mellitus (CMS/HCC)    • GERD (gastroesophageal reflux disease)    • GI bleed    • Hiatal hernia    • Kidney stones    • Stroke (CMS/HCC)     decemeber 2012     Past Surgical History:   Procedure Laterality Date   • CARDIAC CATHETERIZATION Left 3/11/2019    Procedure: Cardiac Catheterization/Vascular Study;  Surgeon: Markell Obrien MD;  Location:  [facility] CATH INVASIVE LOCATION;  Service: Cardiology   • CARDIAC CATHETERIZATION  3/11/2019    Procedure: Functional Flow [place];  Surgeon: Markell Obrien MD;  Location:  [facility] CATH INVASIVE LOCATION;  Service: Cardiology   • CARDIAC CATHETERIZATION N/A 3/11/2019    Procedure: Left ventriculography;  Surgeon: Markell Obrien MD;  Location:  [facility] CATH INVASIVE LOC[facility];  Service: Cardiology     Social History     Socioeconomic History   • Marital status:      Spouse name: Not on file   • Number of children: Not on file   • Years of education: Not on file   • Highest education level: Not on file   Social Needs   • Financial resource strain: Not on file   • Food insecurity - worry: Not on file   • Food insecurity - inability: Not on file   • Transportation needs - medical: Not on file   • Transportation needs - non-medical: Not on file   Occupational History   • Not on file   Tobacco Use   • Smoking status: Former Smoker   • Smokeless tobacco: Current User   Substance and Sexual Activity   • Alcohol use: No     Frequency: Never   • Drug use: No   • Sexual activity: Not on file   Other Topics Concern   • Not on file   Social History Narrative   • Not on file     History reviewed. No pertinent family history.    Labs:  WBC WBC   Date Value Ref Range Status   03/12/2019 10.41 4.80 - 10.80 10*3/mm3 Final   03/11/2019 10.57 4.80 - 10.80 10*3/mm3 Final   03/11/2019 8.25 4.80 - 10.80 10*3/mm3  Final      HGB Hemoglobin   Date Value Ref Range Status   03/12/2019 13.0 (L) 14.0 - 18.0 g/dL Final   03/11/2019 11.0 (L) 14.0 - 18.0 g/dL Final   03/11/2019 13.9 (L) 14.0 - 18.0 g/dL Final      HCT Hematocrit   Date Value Ref Range Status   03/12/2019 39.5 (L) 40.0 - 52.0 % Final   03/11/2019 32.3 (L) 40.0 - 52.0 % Final   03/11/2019 41.4 40.0 - 52.0 % Final      Platelets Platelets   Date Value Ref Range Status   03/12/2019 226 130 - 400 10*3/mm3 Final   03/11/2019 181 130 - 400 10*3/mm3 Final   03/11/2019 207 130 - 400 10*3/mm3 Final      MCV MCV   Date Value Ref Range Status   03/12/2019 88.6 82.0 - 95.0 fL Final   03/11/2019 89.7 82.0 - 95.0 fL Final   03/11/2019 87.9 82.0 - 95.0 fL Final        Results from last 7 days   Lab Units 03/12/19  0321 03/11/19  1235   SODIUM mmol/L 140 140   POTASSIUM mmol/L 5.0 4.5   CHLORIDE mmol/L 103 101   CO2 mmol/L 28.0 29.0   BUN mg/dL 18 18   CREATININE mg/dL 1.30 1.00   CALCIUM mg/dL 9.0 9.1   GLUCOSE mg/dL 269* 256*     Lab Results   Component Value Date    TROPONINI 0.040 (H) 03/11/2019     PT/INR:  No results found for: PROTIME/No results found for: INR    Imaging Results (last 72 hours)     Procedure Component Value Units Date/Time    XR Chest 1 View [[id number]] Collected:  03/12/19 0748     Updated:  03/12/19 0753    Narrative:       EXAMINATION: XR CHEST 1 VW-     3/11/2019 11:40 PM CDT     HISTORY: rrt; R06.09-Other forms of dyspnea.     One view chest x-ray compared with 11/2/2018.     Magnified heart size. Aortic arch calcification.     Chronic opacity at the right lung base was seen to represent  intrathoracic stomach on a CT exam from 4 months ago.     The upper lobes are essentially clear.     There is no pneumothorax and no overt heart failure.     Summary:  1. Intrathoracic stomach with obscuration of the right lung base. No  definite pneumonia, pneumothorax, or heart failure.  This report was finalized on 03/12/2019 07:50 by Dr. Cuong Dias MD.    XR  Abdomen KUB [[id number]] Collected:  03/12/19 0716     Updated:  03/12/19 0722    Narrative:       EXAMINATION: XR ABDOMEN KUB- 3/12/2019 7:16 AM CDT     HISTORY: nausea/ vomiting; post cath; R06.09-Other forms of dyspnea.     REPORT: A supine view of the abdomen is compared with CT of the abdomen  and pelvis 11/2/2018.     Moderate stool volume is present. There is mild dilation of the long  loop of the colon centrally, the maximum diameter of approximately 8.8  cm. No free air is identified on the supine view. Contrast is noted  within the urinary tract bladder. There is mild levoscoliosis of the  lumbar spine. No acute osseous findings.       Impression:       Nonspecific bowel gas pattern with mild dilation of a single  loop of colon centrally. Probable constipation.  This report was finalized on 03/12/2019 07:19 by Dr. Renny Whitman MD.    US Carotid Bilateral [[id number]] Updated:  03/11/19 1840          Objective     No Known Allergies    Medication Review: Performed  Current Facility-Administered Medications   Medication Dose Route Frequency Provider Last Rate Last Dose   • aspirin chewable tablet 81 mg  81 mg Oral Daily Zeynep Cruz APRN   81 mg at 03/12/19 0828   • atorvastatin (LIPITOR) tablet 40 mg  40 mg Oral Nightly Markell Obrien MD   40 mg at 03/11/19 2019   • carvedilol (COREG) tablet 3.125 mg  3.125 mg Oral BID With Meals Markell Obrien MD   3.125 mg at 03/11/19 1849   • DOPamine 400 mg/250 mL (1.6 mg/mL) infusion  2-20 mcg/kg/min Intravenous Titrated Vazquez Lowe MD   Stopped at 03/12/19 0813   • escitalopram (LEXAPRO) tablet 20 mg  20 mg Oral Daily Markell Obrien MD   20 mg at 03/12/19 0828   • ferrous sulfate tablet 325 mg  325 mg Oral BID Markell Obrien MD   325 mg at 03/12/19 0829   • furosemide (LASIX) tablet 40 mg  40 mg Oral Daily Markell Obrien MD   40 mg at 03/12/19 0829   • glipiZIDE (GLUCOTROL) tablet 10 mg  10 mg Oral Daily Markell Obrien MD   10 mg at 03/12/19 0829   • insulin  "detemir (LEVEMIR) injection 34 Units  34 Units Subcutaneous Nightly Markell Obrien MD   34 Units at 03/11/19 2059   • ondansetron (ZOFRAN) injection 4 mg  4 mg Intravenous Q6H PRN Vazquez Lowe MD   4 mg at 03/11/19 2324   • pantoprazole (PROTONIX) EC tablet 40 mg  40 mg Oral Q AM Markell Obrien MD   40 mg at 03/12/19 0535   • ramipril (ALTACE) capsule 10 mg  10 mg Oral Daily Markell Obrien MD   10 mg at 03/11/19 2019   • rOPINIRole (REQUIP) tablet 4 mg  4 mg Oral Nightly Markell Obrien MD   4 mg at 03/11/19 2019   • sodium chloride 0.9 % infusion  50 mL/hr Intravenous Continuous Markell Obrien MD 50 mL/hr at 03/12/19 1023 50 mL/hr at 03/12/19 1023       Vital Sign Min/Max for last 24 hours  Temp  Min: 97.2 °F (36.2 °C)  Max: 98.8 °F (37.1 °C)   BP  Min: 71/49  Max: 155/91   Pulse  Min: 45  Max: 100   Resp  Min: 16  Max: 26   SpO2  Min: 90 %  Max: 98 %   No Data Recorded   Weight  Min: 82.1 kg (180 lb 14.4 oz)  Max: 90.7 kg (200 lb)     Flowsheet Rows      First Filed Value   Admission Height  177.8 cm (70\") Documented at 03/11/2019 1227   Admission Weight  90.7 kg (200 lb) Documented at 03/11/2019 1227               Physical Exam:    General Appearance: Awake, alert, in no acute distress  Eyes: Pupils equal and reactive    Ears: Appear intact with no abnormalities noted  Nose: Nares normal, no drainage  Neck: supple, trachea midline, no carotid bruit and no JVD  Back: no kyphosis present,    Lungs: respirations regular, respirations even and respirations unlabored    Heart: normal S1, S2,  2/6 pansystolic murmur in the left sternal border,  no rub and no click    Abdomen: normal bowel sounds, no tenderness   Skin: no bleeding, bruising or rash  Extremities: no cyanosis  Psychiatric/Behavioral: Negative for agitation, behavioral problems, confusion, the patient does  appear to be nervous/anxious.     Right groin: Arteriotomy site healthy,  No bleeding, swelling or excessive bruising. Preserved distal pulses. "      Results Review:   I reviewed the patient's new clinical results.  I reviewed the patient's new imaging results and agree with the interpretation.  I reviewed the patient's other test results and agree with the interpretation  I personally viewed and interpreted the patient's EKG/Telemetry data  Discussed with patient    Reviewed available prior notes, consults, prior visits, laboratory findings, radiology and cardiology relevant reports. Updated chart as applicable. I have reviewed the patient's medical history in detail and updated the computerized patient record as relevant.      Updated patient regarding any new or relevant abnormalities on review of records or any new findings on physical exam. Mentioned to patient about purpose of visit and desirable health short and long term goals and objectives.     Assessment/Plan       ENNIS (dyspnea on exertion)    Coronary artery disease involving native coronary artery of native heart without angina pectoris    History of stroke    Type 2 diabetes mellitus with circulatory disorder, with long-term current use of insulin (CMS/[facility])    Essential hypertension    Smokeless tobacco use  Severe left ventricular systolic dysfunction  Severe mid left anterior descending coronary artery stenosis with severe stenosis of the diagonal branch  Tortuous proximal left anterior descending coronary artery precluding coronary stenting and   Was hypotensive and bradycardic last night  Was moved to the CCU  Received low-dose dopamine  Dopamine has been discontinued  Lab parameters are reviewed and satisfactory  Hemoglobin A1c is elevated  Further attempts at coronary stenting increases his risk of left main coronary artery dissection    Plan    Will get results of echocardiogram from [place] [facility] clinic  Dopamine has been discontinued    Start low-dose beta-blocker therapy later this evening if stable  Keep in CCU for the time being    Appreciate CT surgery input  Discussed with  patient his wife and nurses taking excellent care of him    Supportive care  Telemetry  Optimal medical therapy    Deep vein thrombosis prophylaxis/precautions  Appropriate diet, fluid, sodium, caffeine, stimulants intake     Questions were encouraged, asked and answered to the patient's  understanding and satisfaction.    Compliance to diet and medications   Avoid NSAIDS    Markell Obrien MD  03/12/19  11:28 AM    EMR Dragon/Transcription was used to dictate part of this note

## 2019-03-12 NOTE — PLAN OF CARE
Problem: Patient Care Overview  Goal: Plan of Care Review  Outcome: Ongoing (interventions implemented as appropriate)   03/12/19 2095   Coping/Psychosocial   Plan of Care Reviewed With patient;spouse   Plan of Care Review   Progress improving   OTHER   Outcome Summary Patient is currently off of dopamine drip and has been tolerating very well, beta-blocker held this morning per Dr. Obrien's order and will be given tonight as patient's vitals have held stable. Patient has been very tired and fatigued but alert and oriented. When getting him up to ambulate it was noted that his legs were very weak and it took a lot of effort to move several feet. Pt denies shortness of breath, chest pain, he has not been eating well, 25% or less of dinner trays. Safety maintained. Will continue to monitor.        Problem: Cardiac Catheterization (Diagnostic/Interventional) (Adult)  Goal: Signs and Symptoms of Listed Potential Problems Will be Absent, Minimized or Managed (Cardiac Catheterization)  Outcome: Ongoing (interventions implemented as appropriate)    Goal: Anesthesia/Sedation Recovery  Outcome: Outcome(s) achieved Date Met: 03/12/19      Problem: Fall Risk (Adult)  Goal: Identify Related Risk Factors and Signs and Symptoms  Outcome: Outcome(s) achieved Date Met: 03/12/19    Goal: Absence of Fall  Outcome: Ongoing (interventions implemented as appropriate)

## 2019-03-12 NOTE — NURSING NOTE
"Pt's wife called out for nurse via call light at 2250. Upon arrival to room, pt was pale and diaphoretic. Pt states he felt \"sick\", \"numb all over\", and felt like he was \"going to die\". Pt's wife states he was \"not acting right.\". Pt was able to answer all orientation questions and BG was 244. BP was 107/62. Monitor room tech states that HR had dropped from 80's to in the 50's during this episode, but that the rate did not stay down long. Heart cath site was C/D/I. Dr. Lowe, the on-call cardiologist, was notified of pt condition, and he ordered Zofran and a STAT EKG. RRT was then called d/t pt symptoms not resolving and HR/BP continuing to decline.   "

## 2019-03-12 NOTE — PROGRESS NOTES
"CC: shortness of breath    Patient had episode of nausea, feeling ill, hypotension and bradycardia overnight. Moved to CCU and placed on dopamine drip at 2 mcg/kg/min. Patient denies chest pain and worsening of shortness of breath. On 3 liters nasal cannula with O2 sat 97%. Resting in bed comfortably now with wife at bedside.    Telemetry: NSR 70s    Visit Vitals  /82   Pulse 81   Temp 98.8 °F (37.1 °C) (Oral)   Resp 22   Ht 177.8 cm (70\")   Wt 88.6 kg (195 lb 6.4 oz) Comment: ccu bed, lowest position hob 30   SpO2 97%   BMI 28.04 kg/m²       Intake/Output Summary (Last 24 hours) at 3/12/2019 0920  Last data filed at 3/12/2019 0900  Gross per 24 hour   Intake 975.83 ml   Output 650 ml   Net 325.83 ml           Preliminary carotid ultrasound: right ICA thickening with minimal stenosis, left ICA less than 50% stenosis, bilateral vertebral antegrade flow    Physical Exam:  General: No apparent distress. In good spirits. Resting in bed.   Cardiovascular: Regular rate and rhythm without murmur, rubs, or gallops.    Pulmonary: Clear to auscultation bilaterally without wheezing, rubs, or rales.  Abdomen: Soft, non-distended, and non-tender.  Extremities: Warm, moves all extremities. No edema.   Neurologic: Grossly intact with no focal deficits.       Impression:  Coronary artery disease  Type 2 diabetes mellitus, Hemoglobin A1c 8.4%  History of stroke  Shortness of breath  Left ventricular dysfunction  Bradycardia, currently NSR on dopamine  Hypotension     Plan:   Complete pre operative testing  Ambulate patient in the future to assess mobility preoperatively  Tentative CABG Friday  "

## 2019-03-12 NOTE — CONSULTS
CC: found some blockages.      Subjective     History of Present Illness    72 yo male with pertinent past medical history of congestive heart failure, diabetes mellitus, GERD, known hiatal hernia, past tobacco use and history of stroke presents with increasing shortness of breath.  This has been present for the last few months.  It has increased in severity.  No chest pain.  No lower extremity edema.  He was recently admitted for pneumonia and treated as such late last year.  He did have further evaluation with he reporting that he was told his ct scan of the chest demonstrated heart failure.  A referral was made to Dr. Obrien.  LHC was performed today finding significant LAD distribution CAD not amenable to PCI.   His CVA was attributed to his diabetes.      Separately, he and his wife report concern that he may not be a candidate for surgery as he was declined surgery to fix his hiatal hernia.     Review of Systems   Constitutional: Positive for activity change and fatigue. Negative for appetite change, chills, diaphoresis, fever and unexpected weight change.   HENT: Negative for dental problem, hearing loss, nosebleeds, sore throat, trouble swallowing and voice change.    Eyes: Negative for photophobia, redness and visual disturbance.   Respiratory: Positive for shortness of breath. Negative for apnea, cough, chest tightness, wheezing and stridor.    Cardiovascular: Negative for chest pain, palpitations and leg swelling.   Gastrointestinal: Positive for nausea. Negative for abdominal distention, abdominal pain, blood in stool, constipation, diarrhea and vomiting.   Endocrine: Negative for cold intolerance, heat intolerance, polyphagia and polyuria.   Genitourinary: Negative for decreased urine volume, difficulty urinating, dysuria, flank pain, frequency, hematuria and urgency.   Musculoskeletal: Positive for arthralgias. Negative for back pain, gait problem, joint swelling, myalgias and neck pain.   Skin: Negative  for pallor, rash and wound.   Allergic/Immunologic: Negative for immunocompromised state.   Neurological: Positive for syncope, weakness, light-headedness and numbness. Negative for dizziness, tremors, seizures, speech difficulty and headaches.   Hematological: Does not bruise/bleed easily.   Psychiatric/Behavioral: Negative for confusion, sleep disturbance and suicidal ideas. The patient is not nervous/anxious.           Past Medical History:   Diagnosis Date   • CHF (congestive heart failure) (CMS/[facility])    • Diabetes mellitus (CMS/[facility])    • GERD (gastroesophageal reflux disease)    • GI bleed    • Hiatal hernia    • Kidney stones    • Stroke (CMS/HCC)     decemeber 2012     History reviewed. No pertinent surgical history.  History reviewed. No pertinent family history.  Social History     Tobacco Use   • Smoking status: Former Smoker   • Smokeless tobacco: Current User   Substance Use Topics   • Alcohol use: No     Frequency: Never   • Drug use: No     Medications Prior to Admission   Medication Sig Dispense Refill Last Dose   • aspirin 325 MG tablet Take 325 mg by mouth Daily.   3/11/2019 at Unknown time   • atorvastatin (LIPITOR) 40 MG tablet Take 40 mg by mouth Every Night.   3/10/2019 at Unknown time   • carvedilol (COREG) 3.125 MG tablet Take 3.125 mg by mouth 2 (Two) Times a Day With Meals.   3/11/2019 at Unknown time   • escitalopram (LEXAPRO) 20 MG tablet Take 20 mg by mouth Daily.   3/11/2019 at Unknown time   • ferrous sulfate 325 (65 FE) MG tablet Take 325 mg by mouth 2 (Two) Times a Day.   3/11/2019 at Unknown time   • furosemide (LASIX) 40 MG tablet Take 40 mg by mouth Daily.   3/10/2019 at Unknown time   • glipiZIDE (GLUCOTROL) 10 MG tablet Take 10 mg by mouth Daily.   3/11/2019 at Unknown time   • insulin glargine (LANTUS) 100 UNIT/ML injection Inject 34 Units under the skin into the appropriate area as directed Every Night.   3/10/2019 at Unknown time   • lansoprazole (PREVACID) 30 MG capsule Take  30 mg by mouth 2 (Two) Times a Day.   3/11/2019 at Unknown time   • magnesium oxide (MAGOX) 400 (241.3 Mg) MG tablet tablet Take 400 mg by mouth 1 (One) Time.   3/11/2019 at Unknown time   • metFORMIN (GLUCOPHAGE) 1000 MG tablet Take 1,000 mg by mouth.   3/9/2019 at Unknown time   • ramipril (ALTACE) 10 MG capsule Take 10 mg by mouth Daily.   3/10/2019 at Unknown time   • rOPINIRole (REQUIP) 4 MG tablet Take 4 mg by mouth Every Night.   3/10/2019 at Unknown time     Allergies:  Patient has no known allergies.    Objective      Vital Signs  Temp:  [97.2 °F (36.2 °C)-98.4 °F (36.9 °C)] 97.4 °F (36.3 °C)  Heart Rate:  [70-89] 88  Resp:  [16-23] 20  BP: (132-155)/() 141/67    Physical Exam   Constitutional: He is oriented to person, place, and time. He appears well-developed.   HENT:   Head: Normocephalic and atraumatic.   Mouth/Throat: Oropharynx is clear and moist.   Eyes: EOM are normal. Pupils are equal, round, and reactive to light.   Neck: Normal range of motion. Neck supple. No JVD present. No tracheal deviation present. No thyromegaly present.   Cardiovascular: Normal rate, regular rhythm, normal heart sounds and intact distal pulses. Exam reveals no gallop and no friction rub.   No murmur heard.  Pulmonary/Chest: Effort normal and breath sounds normal. No respiratory distress. He has no wheezes. He has no rales. He exhibits no tenderness.   Abdominal: Soft. He exhibits no distension. There is no tenderness.   Musculoskeletal: Normal range of motion. He exhibits no edema.   Lymphadenopathy:     He has no cervical adenopathy.   Neurological: He is alert and oriented to person, place, and time. No cranial nerve deficit.   Skin: Skin is warm and dry.   Psychiatric: He has a normal mood and affect.       Results Review:      I reviewed the patient's new clinical results.  Discussed with patient and wife      Assessment/Plan       ENNIS (dyspnea on exertion)    Coronary artery disease involving native coronary  artery of native heart without angina pectoris    History of stroke    Type 2 diabetes mellitus with circulatory disorder, with long-term current use of insulin (CMS/[facility])    Essential hypertension    Smokeless tobacco use  Chronic systolic heart failure        I discussed the patients findings and my recommendations with patient and wife, We discussed the options for treatment of coronary artery disease to include medical therapy, coronary stenting, and surgical revascularization.  We discussed the pros and cons of each option and how it pertains to the current case.  The STS Risk score was roughly calculated using the STS Risk Calculator and discussed with the patient and family.  Coronary artery bypass grafting is best option given patient's findings and risk factors.  We discussed the operative conduct and expected hospital and outpatient recovery.  Risks were discussed to include but not limited to bleeding, infection, stroke, heart attack, need for additional procedures, anesthesia risk, organ dysfunction and/or death, prolonged mechanical ventilation, prolonged ICU stay, chronic pain syndromes, sternal nonunion, and/or death.  We discussed the need to utilize all medical treatments additionally prescribed post surgery.  We will complete his workup and make final decision making.          The patient and family understands the risks, benefits, and alternatives and he wishes to proceed forward with surgery.        Richard Orona MD  03/11/19  11:02 PM

## 2019-03-12 NOTE — PROGRESS NOTES
Discharge Planning Assessment  [facility]     Patient Name: Gerson Wilhelm  MRN: [id number]  Today's Date: 3/12/2019    Admit Date: 3/11/2019    Discharge Needs Assessment     Row Name 03/12/19 1340       Living Environment    Lives With  spouse    Current Living Arrangements  home/apartment/condo    Primary Care Provided by  self    Provides Primary Care For  no one    Family Caregiver if Needed  spouse    Quality of Family Relationships  supportive    Able to Return to Prior Arrangements  yes       Resource/Environmental Concerns    Resource/Environmental Concerns  none       Transition Planning    Patient/Family Anticipates Transition to  home with family    Patient/Family Anticipated Services at Transition  none    Transportation Anticipated  family or friend will provide       Discharge Needs Assessment    Readmission Within the Last 30 Days  no previous admission in last 30 days    Concerns to be Addressed  discharge planning    Equipment Currently Used at Home  none    Anticipated Changes Related to Illness  none    Equipment Needed After Discharge  none    Current Discharge Risk  chronically ill    Discharge Coordination/Progress  Patient admitted from home where he resides with his spouse.  Patient has a PCP and RX coverage.  Chart indicates patient may be having CABG on Friday.  SW will follow for discharge planning needs.        Discharge Plan    No documentation.       Destination      No service coordination in this encounter.      Durable Medical Equipment      No service coordination in this encounter.      Dialysis/Infusion      No service coordination in this encounter.      Home Medical Care      No service coordination in this encounter.      Community Resources      No service coordination in this encounter.          Demographic Summary    No documentation.       Functional Status    No documentation.       Psychosocial    No documentation.       Abuse/Neglect    No documentation.       Legal     No documentation.       Substance Abuse    No documentation.       Patient Forms    No documentation.           SYD ZhouW

## 2019-03-12 NOTE — H&P (VIEW-ONLY)
CC: found some blockages.      Subjective     History of Present Illness    72 yo male with pertinent past medical history of congestive heart failure, diabetes mellitus, GERD, known hiatal hernia, past tobacco use and history of stroke presents with increasing shortness of breath.  This has been present for the last few months.  It has increased in severity.  No chest pain.  No lower extremity edema.  He was recently admitted for pneumonia and treated as such late last year.  He did have further evaluation with he reporting that he was told his ct scan of the chest demonstrated heart failure.  A referral was made to Dr. Obrien.  LHC was performed today finding significant LAD distribution CAD not amenable to PCI.   His CVA was attributed to his diabetes.      Separately, he and his wife report concern that he may not be a candidate for surgery as he was declined surgery to fix his hiatal hernia.     Review of Systems   Constitutional: Positive for activity change and fatigue. Negative for appetite change, chills, diaphoresis, fever and unexpected weight change.   HENT: Negative for dental problem, hearing loss, nosebleeds, sore throat, trouble swallowing and voice change.    Eyes: Negative for photophobia, redness and visual disturbance.   Respiratory: Positive for shortness of breath. Negative for apnea, cough, chest tightness, wheezing and stridor.    Cardiovascular: Negative for chest pain, palpitations and leg swelling.   Gastrointestinal: Positive for nausea. Negative for abdominal distention, abdominal pain, blood in stool, constipation, diarrhea and vomiting.   Endocrine: Negative for cold intolerance, heat intolerance, polyphagia and polyuria.   Genitourinary: Negative for decreased urine volume, difficulty urinating, dysuria, flank pain, frequency, hematuria and urgency.   Musculoskeletal: Positive for arthralgias. Negative for back pain, gait problem, joint swelling, myalgias and neck pain.   Skin: Negative  for pallor, rash and wound.   Allergic/Immunologic: Negative for immunocompromised state.   Neurological: Positive for syncope, weakness, light-headedness and numbness. Negative for dizziness, tremors, seizures, speech difficulty and headaches.   Hematological: Does not bruise/bleed easily.   Psychiatric/Behavioral: Negative for confusion, sleep disturbance and suicidal ideas. The patient is not nervous/anxious.           Past Medical History:   Diagnosis Date   • CHF (congestive heart failure) (CMS/[facility])    • Diabetes mellitus (CMS/[facility])    • GERD (gastroesophageal reflux disease)    • GI bleed    • Hiatal hernia    • Kidney stones    • Stroke (CMS/HCC)     decemeber 2012     History reviewed. No pertinent surgical history.  History reviewed. No pertinent family history.  Social History     Tobacco Use   • Smoking status: Former Smoker   • Smokeless tobacco: Current User   Substance Use Topics   • Alcohol use: No     Frequency: Never   • Drug use: No     Medications Prior to Admission   Medication Sig Dispense Refill Last Dose   • aspirin 325 MG tablet Take 325 mg by mouth Daily.   3/11/2019 at Unknown time   • atorvastatin (LIPITOR) 40 MG tablet Take 40 mg by mouth Every Night.   3/10/2019 at Unknown time   • carvedilol (COREG) 3.125 MG tablet Take 3.125 mg by mouth 2 (Two) Times a Day With Meals.   3/11/2019 at Unknown time   • escitalopram (LEXAPRO) 20 MG tablet Take 20 mg by mouth Daily.   3/11/2019 at Unknown time   • ferrous sulfate 325 (65 FE) MG tablet Take 325 mg by mouth 2 (Two) Times a Day.   3/11/2019 at Unknown time   • furosemide (LASIX) 40 MG tablet Take 40 mg by mouth Daily.   3/10/2019 at Unknown time   • glipiZIDE (GLUCOTROL) 10 MG tablet Take 10 mg by mouth Daily.   3/11/2019 at Unknown time   • insulin glargine (LANTUS) 100 UNIT/ML injection Inject 34 Units under the skin into the appropriate area as directed Every Night.   3/10/2019 at Unknown time   • lansoprazole (PREVACID) 30 MG capsule Take  30 mg by mouth 2 (Two) Times a Day.   3/11/2019 at Unknown time   • magnesium oxide (MAGOX) 400 (241.3 Mg) MG tablet tablet Take 400 mg by mouth 1 (One) Time.   3/11/2019 at Unknown time   • metFORMIN (GLUCOPHAGE) 1000 MG tablet Take 1,000 mg by mouth.   3/9/2019 at Unknown time   • ramipril (ALTACE) 10 MG capsule Take 10 mg by mouth Daily.   3/10/2019 at Unknown time   • rOPINIRole (REQUIP) 4 MG tablet Take 4 mg by mouth Every Night.   3/10/2019 at Unknown time     Allergies:  Patient has no known allergies.    Objective      Vital Signs  Temp:  [97.2 °F (36.2 °C)-98.4 °F (36.9 °C)] 97.4 °F (36.3 °C)  Heart Rate:  [70-89] 88  Resp:  [16-23] 20  BP: (132-155)/() 141/67    Physical Exam   Constitutional: He is oriented to person, place, and time. He appears well-developed.   HENT:   Head: Normocephalic and atraumatic.   Mouth/Throat: Oropharynx is clear and moist.   Eyes: EOM are normal. Pupils are equal, round, and reactive to light.   Neck: Normal range of motion. Neck supple. No JVD present. No tracheal deviation present. No thyromegaly present.   Cardiovascular: Normal rate, regular rhythm, normal heart sounds and intact distal pulses. Exam reveals no gallop and no friction rub.   No murmur heard.  Pulmonary/Chest: Effort normal and breath sounds normal. No respiratory distress. He has no wheezes. He has no rales. He exhibits no tenderness.   Abdominal: Soft. He exhibits no distension. There is no tenderness.   Musculoskeletal: Normal range of motion. He exhibits no edema.   Lymphadenopathy:     He has no cervical adenopathy.   Neurological: He is alert and oriented to person, place, and time. No cranial nerve deficit.   Skin: Skin is warm and dry.   Psychiatric: He has a normal mood and affect.       Results Review:      I reviewed the patient's new clinical results.  Discussed with patient and wife      Assessment/Plan       ENNIS (dyspnea on exertion)    Coronary artery disease involving native coronary  artery of native heart without angina pectoris    History of stroke    Type 2 diabetes mellitus with circulatory disorder, with long-term current use of insulin (CMS/[facility])    Essential hypertension    Smokeless tobacco use  Chronic systolic heart failure        I discussed the patients findings and my recommendations with patient and wife, We discussed the options for treatment of coronary artery disease to include medical therapy, coronary stenting, and surgical revascularization.  We discussed the pros and cons of each option and how it pertains to the current case.  The STS Risk score was roughly calculated using the STS Risk Calculator and discussed with the patient and family.  Coronary artery bypass grafting is best option given patient's findings and risk factors.  We discussed the operative conduct and expected hospital and outpatient recovery.  Risks were discussed to include but not limited to bleeding, infection, stroke, heart attack, need for additional procedures, anesthesia risk, organ dysfunction and/or death, prolonged mechanical ventilation, prolonged ICU stay, chronic pain syndromes, sternal nonunion, and/or death.  We discussed the need to utilize all medical treatments additionally prescribed post surgery.  We will complete his workup and make final decision making.          The patient and family understands the risks, benefits, and alternatives and he wishes to proceed forward with surgery.        Richard Orona MD  03/11/19  11:02 PM

## 2019-03-12 NOTE — NURSING NOTE
Respiratory therapist called in order to see when PFT will be done.  Palomo ROCKWELL says respiratory supervisor will be coming up shortly to perform PFT.

## 2019-03-13 ENCOUNTER — TELEPHONE (OUTPATIENT)
Dept: CARDIAC SURGERY | Facility: CLINIC | Age: 74
End: 2019-03-13

## 2019-03-13 ENCOUNTER — APPOINTMENT (OUTPATIENT)
Dept: PULMONOLOGY | Facility: HOSPITAL | Age: 74
End: 2019-03-13

## 2019-03-13 LAB
ABO GROUP BLD: NORMAL
ALBUMIN SERPL-MCNC: 3.6 G/DL (ref 3.5–5)
ALBUMIN/GLOB SERPL: 1.7 G/DL (ref 1.1–2.5)
ALP SERPL-CCNC: 82 U/L (ref 24–120)
ALT SERPL W P-5'-P-CCNC: <15 U/L (ref 0–54)
ANION GAP SERPL CALCULATED.3IONS-SCNC: 9 MMOL/L (ref 4–13)
APTT PPP: 34.1 SECONDS (ref 24.1–34.8)
AST SERPL-CCNC: 16 U/L (ref 7–45)
BASOPHILS # BLD AUTO: 0.06 10*3/MM3 (ref 0–0.2)
BASOPHILS NFR BLD AUTO: 0.9 % (ref 0–2)
BH CV ECHO MEAS - AO MAX PG (FULL): 1.1 MMHG
BH CV ECHO MEAS - AO MAX PG: 8.1 MMHG
BH CV ECHO MEAS - AO MEAN PG (FULL): 2 MMHG
BH CV ECHO MEAS - AO MEAN PG: 6 MMHG
BH CV ECHO MEAS - AO ROOT AREA (BSA CORRECTED): 1.4
BH CV ECHO MEAS - AO ROOT AREA: 6.6 CM^2
BH CV ECHO MEAS - AO ROOT DIAM: 2.9 CM
BH CV ECHO MEAS - AO V2 MAX: 142 CM/SEC
BH CV ECHO MEAS - AO V2 MEAN: 112 CM/SEC
BH CV ECHO MEAS - AO V2 VTI: 29.2 CM
BH CV ECHO MEAS - AVA(I,A): 3.5 CM^2
BH CV ECHO MEAS - AVA(I,D): 3.5 CM^2
BH CV ECHO MEAS - AVA(V,A): 3.5 CM^2
BH CV ECHO MEAS - AVA(V,D): 3.5 CM^2
BH CV ECHO MEAS - BSA(HAYCOCK): 2.1 M^2
BH CV ECHO MEAS - BSA: 2.1 M^2
BH CV ECHO MEAS - BZI_BMI: 28 KILOGRAMS/M^2
BH CV ECHO MEAS - BZI_METRIC_HEIGHT: 177.8 CM
BH CV ECHO MEAS - BZI_METRIC_WEIGHT: 88.5 KG
BH CV ECHO MEAS - EDV(CUBED): 98 ML
BH CV ECHO MEAS - EDV(MOD-SP4): 166 ML
BH CV ECHO MEAS - EDV(TEICH): 97.8 ML
BH CV ECHO MEAS - EF(CUBED): 59.5 %
BH CV ECHO MEAS - EF(MOD-SP4): 33.1 %
BH CV ECHO MEAS - EF(TEICH): 51.2 %
BH CV ECHO MEAS - ESV(CUBED): 39.7 ML
BH CV ECHO MEAS - ESV(MOD-SP4): 111 ML
BH CV ECHO MEAS - ESV(TEICH): 47.8 ML
BH CV ECHO MEAS - FS: 26 %
BH CV ECHO MEAS - IVS/LVPW: 0.99
BH CV ECHO MEAS - IVSD: 1 CM
BH CV ECHO MEAS - LA/AO: 0.97
BH CV ECHO MEAS - LAT PEAK E' VEL: 6 CM/SEC
BH CV ECHO MEAS - LV DIASTOLIC VOL/BSA (35-75): 80.4 ML/M^2
BH CV ECHO MEAS - LV MASS(C)D: 160.5 GRAMS
BH CV ECHO MEAS - LV MASS(C)DI: 77.7 GRAMS/M^2
BH CV ECHO MEAS - LV MAX PG: 7 MMHG
BH CV ECHO MEAS - LV MEAN PG: 4 MMHG
BH CV ECHO MEAS - LV SYSTOLIC VOL/BSA (12-30): 53.8 ML/M^2
BH CV ECHO MEAS - LV V1 MAX: 132 CM/SEC
BH CV ECHO MEAS - LV V1 MEAN: 89.6 CM/SEC
BH CV ECHO MEAS - LV V1 VTI: 27.2 CM
BH CV ECHO MEAS - LVIDD: 4.6 CM
BH CV ECHO MEAS - LVIDS: 3.4 CM
BH CV ECHO MEAS - LVLD AP4: 8.7 CM
BH CV ECHO MEAS - LVLS AP4: 7.6 CM
BH CV ECHO MEAS - LVOT AREA (M): 3.8 CM^2
BH CV ECHO MEAS - LVOT AREA: 3.8 CM^2
BH CV ECHO MEAS - LVOT DIAM: 2.2 CM
BH CV ECHO MEAS - LVPWD: 1 CM
BH CV ECHO MEAS - MED PEAK E' VEL: 4.9 CM/SEC
BH CV ECHO MEAS - MV A MAX VEL: 84.4 CM/SEC
BH CV ECHO MEAS - MV DEC TIME: 0.22 SEC
BH CV ECHO MEAS - MV E MAX VEL: 55.8 CM/SEC
BH CV ECHO MEAS - MV E/A: 0.66
BH CV ECHO MEAS - SI(AO): 93.4 ML/M^2
BH CV ECHO MEAS - SI(CUBED): 28.2 ML/M^2
BH CV ECHO MEAS - SI(LVOT): 50.1 ML/M^2
BH CV ECHO MEAS - SI(MOD-SP4): 26.6 ML/M^2
BH CV ECHO MEAS - SI(TEICH): 24.2 ML/M^2
BH CV ECHO MEAS - SV(AO): 192.9 ML
BH CV ECHO MEAS - SV(CUBED): 58.3 ML
BH CV ECHO MEAS - SV(LVOT): 103.4 ML
BH CV ECHO MEAS - SV(MOD-SP4): 55 ML
BH CV ECHO MEAS - SV(TEICH): 50.1 ML
BH CV ECHO MEASUREMENTS AVERAGE E/E' RATIO: 10.24
BILIRUB SERPL-MCNC: 0.5 MG/DL (ref 0.1–1)
BLD GP AB SCN SERPL QL: NEGATIVE
BUN BLD-MCNC: 16 MG/DL (ref 5–21)
BUN/CREAT SERPL: 14.8 (ref 7–25)
CALCIUM SPEC-SCNC: 8.5 MG/DL (ref 8.4–10.4)
CHLORIDE SERPL-SCNC: 102 MMOL/L (ref 98–110)
CO2 SERPL-SCNC: 27 MMOL/L (ref 24–31)
CREAT BLD-MCNC: 1.08 MG/DL (ref 0.5–1.4)
DEPRECATED RDW RBC AUTO: 45.7 FL (ref 40–54)
EOSINOPHIL # BLD AUTO: 0.69 10*3/MM3 (ref 0–0.7)
EOSINOPHIL NFR BLD AUTO: 10.5 % (ref 0–4)
ERYTHROCYTE [DISTWIDTH] IN BLOOD BY AUTOMATED COUNT: 14 % (ref 12–15)
GFR SERPL CREATININE-BSD FRML MDRD: 67 ML/MIN/1.73
GLOBULIN UR ELPH-MCNC: 2.1 GM/DL
GLUCOSE BLD-MCNC: 270 MG/DL (ref 70–100)
GLUCOSE BLDC GLUCOMTR-MCNC: 134 MG/DL (ref 70–130)
GLUCOSE BLDC GLUCOMTR-MCNC: 169 MG/DL (ref 70–130)
GLUCOSE BLDC GLUCOMTR-MCNC: 262 MG/DL (ref 70–130)
GLUCOSE BLDC GLUCOMTR-MCNC: 300 MG/DL (ref 70–130)
HCT VFR BLD AUTO: 35.9 % (ref 40–52)
HGB BLD-MCNC: 12 G/DL (ref 14–18)
IMM GRANULOCYTES # BLD AUTO: 0.01 10*3/MM3 (ref 0–0.05)
IMM GRANULOCYTES NFR BLD AUTO: 0.2 % (ref 0–5)
INR PPP: 1.16 (ref 0.91–1.09)
LEFT ATRIUM VOLUME INDEX: 37.9 ML/M2
LEFT ATRIUM VOLUME: 78 CM3
LV EF 2D ECHO EST: 33 %
LYMPHOCYTES # BLD AUTO: 0.95 10*3/MM3 (ref 0.72–4.86)
LYMPHOCYTES NFR BLD AUTO: 14.4 % (ref 15–45)
MAXIMAL PREDICTED HEART RATE: 148 BPM
MCH RBC QN AUTO: 30.2 PG (ref 28–32)
MCHC RBC AUTO-ENTMCNC: 33.4 G/DL (ref 33–36)
MCV RBC AUTO: 90.2 FL (ref 82–95)
MONOCYTES # BLD AUTO: 0.43 10*3/MM3 (ref 0.19–1.3)
MONOCYTES NFR BLD AUTO: 6.5 % (ref 4–12)
NEUTROPHILS # BLD AUTO: 4.44 10*3/MM3 (ref 1.87–8.4)
NEUTROPHILS NFR BLD AUTO: 67.5 % (ref 39–78)
NRBC BLD AUTO-RTO: 0 /100 WBC (ref 0–0)
PLATELET # BLD AUTO: 162 10*3/MM3 (ref 130–400)
PMV BLD AUTO: 10.5 FL (ref 6–12)
POTASSIUM BLD-SCNC: 4.2 MMOL/L (ref 3.5–5.3)
PROT SERPL-MCNC: 5.7 G/DL (ref 6.3–8.7)
PROTHROMBIN TIME: 15.2 SECONDS (ref 11.9–14.6)
RBC # BLD AUTO: 3.98 10*6/MM3 (ref 4.8–5.9)
RH BLD: NEGATIVE
SODIUM BLD-SCNC: 138 MMOL/L (ref 135–145)
STRESS TARGET HR: 126 BPM
T&S EXPIRATION DATE: NORMAL
WBC NRBC COR # BLD: 6.58 10*3/MM3 (ref 4.8–10.8)

## 2019-03-13 PROCEDURE — 86850 RBC ANTIBODY SCREEN: CPT | Performed by: NURSE PRACTITIONER

## 2019-03-13 PROCEDURE — 99233 SBSQ HOSP IP/OBS HIGH 50: CPT | Performed by: INTERNAL MEDICINE

## 2019-03-13 PROCEDURE — 94799 UNLISTED PULMONARY SVC/PX: CPT

## 2019-03-13 PROCEDURE — 94727 GAS DIL/WSHOT DETER LNG VOL: CPT

## 2019-03-13 PROCEDURE — 85025 COMPLETE CBC W/AUTO DIFF WBC: CPT | Performed by: NURSE PRACTITIONER

## 2019-03-13 PROCEDURE — 80053 COMPREHEN METABOLIC PANEL: CPT | Performed by: NURSE PRACTITIONER

## 2019-03-13 PROCEDURE — 85730 THROMBOPLASTIN TIME PARTIAL: CPT | Performed by: NURSE PRACTITIONER

## 2019-03-13 PROCEDURE — 63710000001 INSULIN DETEMIR PER 5 UNITS: Performed by: INTERNAL MEDICINE

## 2019-03-13 PROCEDURE — 94060 EVALUATION OF WHEEZING: CPT | Performed by: INTERNAL MEDICINE

## 2019-03-13 PROCEDURE — 97162 PT EVAL MOD COMPLEX 30 MIN: CPT

## 2019-03-13 PROCEDURE — 86901 BLOOD TYPING SEROLOGIC RH(D): CPT | Performed by: NURSE PRACTITIONER

## 2019-03-13 PROCEDURE — 86901 BLOOD TYPING SEROLOGIC RH(D): CPT

## 2019-03-13 PROCEDURE — 94060 EVALUATION OF WHEEZING: CPT

## 2019-03-13 PROCEDURE — 99222 1ST HOSP IP/OBS MODERATE 55: CPT | Performed by: INTERNAL MEDICINE

## 2019-03-13 PROCEDURE — 94729 DIFFUSING CAPACITY: CPT

## 2019-03-13 PROCEDURE — 86920 COMPATIBILITY TEST SPIN: CPT

## 2019-03-13 PROCEDURE — 94727 GAS DIL/WSHOT DETER LNG VOL: CPT | Performed by: INTERNAL MEDICINE

## 2019-03-13 PROCEDURE — 85610 PROTHROMBIN TIME: CPT | Performed by: NURSE PRACTITIONER

## 2019-03-13 PROCEDURE — 86900 BLOOD TYPING SEROLOGIC ABO: CPT | Performed by: NURSE PRACTITIONER

## 2019-03-13 PROCEDURE — 82962 GLUCOSE BLOOD TEST: CPT

## 2019-03-13 PROCEDURE — 86900 BLOOD TYPING SEROLOGIC ABO: CPT

## 2019-03-13 RX ORDER — ALBUTEROL SULFATE 2.5 MG/3ML
2.5 SOLUTION RESPIRATORY (INHALATION) ONCE
Status: COMPLETED | OUTPATIENT
Start: 2019-03-13 | End: 2019-03-13

## 2019-03-13 RX ADMIN — FERROUS SULFATE TAB 325 MG (65 MG ELEMENTAL FE) 325 MG: 325 (65 FE) TAB at 08:08

## 2019-03-13 RX ADMIN — FUROSEMIDE 40 MG: 40 TABLET ORAL at 08:08

## 2019-03-13 RX ADMIN — INSULIN DETEMIR 34 UNITS: 100 INJECTION, SOLUTION SUBCUTANEOUS at 20:19

## 2019-03-13 RX ADMIN — ALBUTEROL SULFATE 2.5 MG: 2.5 SOLUTION RESPIRATORY (INHALATION) at 10:43

## 2019-03-13 RX ADMIN — ROPINIROLE 4 MG: 4 TABLET ORAL at 20:14

## 2019-03-13 RX ADMIN — SODIUM CHLORIDE 50 ML/HR: 9 INJECTION, SOLUTION INTRAVENOUS at 22:13

## 2019-03-13 RX ADMIN — ATORVASTATIN CALCIUM 40 MG: 40 TABLET, FILM COATED ORAL at 20:14

## 2019-03-13 RX ADMIN — FERROUS SULFATE TAB 325 MG (65 MG ELEMENTAL FE) 325 MG: 325 (65 FE) TAB at 20:14

## 2019-03-13 RX ADMIN — ASPIRIN 81 MG: 81 TABLET, CHEWABLE ORAL at 08:08

## 2019-03-13 RX ADMIN — PANTOPRAZOLE SODIUM 40 MG: 40 TABLET, DELAYED RELEASE ORAL at 05:28

## 2019-03-13 RX ADMIN — CARVEDILOL 3.12 MG: 3.12 TABLET, FILM COATED ORAL at 18:41

## 2019-03-13 RX ADMIN — ESCITSLOPRAM 20 MG: 10 TABLET ORAL at 08:09

## 2019-03-13 RX ADMIN — RAMIPRIL 10 MG: 5 CAPSULE ORAL at 20:14

## 2019-03-13 RX ADMIN — SODIUM CHLORIDE 50 ML/HR: 9 INJECTION, SOLUTION INTRAVENOUS at 00:40

## 2019-03-13 RX ADMIN — CARVEDILOL 3.12 MG: 3.12 TABLET, FILM COATED ORAL at 08:08

## 2019-03-13 NOTE — PLAN OF CARE
Problem: Patient Care Overview  Goal: Plan of Care Review  Outcome: Ongoing (interventions implemented as appropriate)   03/13/19 1430   Coping/Psychosocial   Plan of Care Reviewed With patient;spouse   OTHER   Outcome Summary PT luca completed. He was CGA for all bed mobility, sit <> stand transfer, and gait ~ 200 ft. With fatigue he would circumduct his R hip due to old CVA. He and his wife had general questions about his open heart surgery being performed tomorrow. PT also educated he and his wife on post op sternal precautions with PT. We will await post op orders to begin activity post op open heart surgery.

## 2019-03-13 NOTE — PLAN OF CARE
Problem: Patient Care Overview  Goal: Plan of Care Review  Outcome: Ongoing (interventions implemented as appropriate)   03/13/19 4406   Coping/Psychosocial   Plan of Care Reviewed With patient   Plan of Care Review   Progress no change   OTHER   Outcome Summary Patient has been c/o mild pain located in his right groin, he has been ambulating today, has walked around the unit x2 so far. He is very unsteady upon standing but becomes more steady as he takes a few steps. VSS, PFT test was very exhausting to patient, the plan is that pt will likely get a CABG tomorrow. Wife is at bedside, supportive of patient. No c/o chest pain although patient did become short of breath during his PFT. Safety maintained. Will continue to monitor.        Problem: Cardiac Catheterization (Diagnostic/Interventional) (Adult)  Goal: Signs and Symptoms of Listed Potential Problems Will be Absent, Minimized or Managed (Cardiac Catheterization)  Outcome: Ongoing (interventions implemented as appropriate)      Problem: Fall Risk (Adult)  Goal: Absence of Fall  Outcome: Ongoing (interventions implemented as appropriate)

## 2019-03-13 NOTE — PLAN OF CARE
Problem: Patient Care Overview  Goal: Plan of Care Review  Outcome: Ongoing (interventions implemented as appropriate)   03/13/19 4702   Coping/Psychosocial   Plan of Care Reviewed With patient   Plan of Care Review   Progress improving   OTHER   Outcome Summary VSS. no complaints of pain, appears to have slept well. urine ouput about 475 overnight. 2L NC while sleeping, at times. alert and oriented. will continue to monitor.     Goal: Individualization and Mutuality  Outcome: Ongoing (interventions implemented as appropriate)    Goal: Discharge Needs Assessment  Outcome: Ongoing (interventions implemented as appropriate)    Goal: Interprofessional Rounds/Family Conf  Outcome: Ongoing (interventions implemented as appropriate)      Problem: Cardiac Catheterization (Diagnostic/Interventional) (Adult)  Goal: Signs and Symptoms of Listed Potential Problems Will be Absent, Minimized or Managed (Cardiac Catheterization)  Outcome: Ongoing (interventions implemented as appropriate)      Problem: Fall Risk (Adult)  Goal: Absence of Fall  Outcome: Ongoing (interventions implemented as appropriate)

## 2019-03-13 NOTE — TELEPHONE ENCOUNTER
Luda at [facility]U calling to see if Dr Orona still wanted to due a nuclear test on pt or is it OK for pt to eat?  Can reach her at [phone]

## 2019-03-13 NOTE — THERAPY EVALUATION
Acute Care - Physical Therapy Initial Evaluation  [facility]     Patient Name: Gerson Wilhelm  : 1945  MRN: [id number]  Today's Date: 3/13/2019   Onset of Illness/Injury or Date of Surgery: 19  Date of Referral to PT: 19  Referring Physician: Dr Obrien      Admit Date: 3/11/2019    Visit Dx:     ICD-10-CM ICD-9-CM   1. Impaired mobility Z74.09 799.89   2. ENNIS (dyspnea on exertion) R06.09 786.09     Patient Active Problem List   Diagnosis   • ENNIS (dyspnea on exertion)   • Coronary artery disease involving native coronary artery of native heart without angina pectoris   • History of stroke   • Type 2 diabetes mellitus with circulatory disorder, with long-term current use of insulin (CMS/[facility])   • Essential hypertension   • Smokeless tobacco use     Past Medical History:   Diagnosis Date   • CHF (congestive heart failure) (CMS/[facility])    • Diabetes mellitus (CMS/[facility])    • GERD (gastroesophageal reflux disease)    • GI bleed    • Hiatal hernia    • Kidney stones    • Stroke (CMS/[facility])     2012     Past Surgical History:   Procedure Laterality Date   • CARDIAC CATHETERIZATION Left 3/11/2019    Procedure: Cardiac Catheterization/Vascular Study;  Surgeon: Markell Obrien MD;  Location:  [facility] CATH [facility];  Service: Cardiology   • CARDIAC CATHETERIZATION  3/11/2019    Procedure: Functional Flow [place];  Surgeon: Markell Obrien MD;  Location:  [facility];  Service: Cardiology   • CARDIAC CATHETERIZATION N/A 3/11/2019    Procedure: Left ventriculography;  Surgeon: Markell Obrien MD;  Location:  [facility];  Service: Cardiology        PT ASSESSMENT (last 12 hours)      Physical Therapy Evaluation     Row Name 19 1430          PT Evaluation Time/Intention    Subjective Information  no complaints  -DARREN     Document Type  evaluation  -DARREN     Mode of Treatment  physical therapy  -DARREN     Row Name 19 1430          General Information    Patient Profile  Reviewed?  yes  -DARREN     Onset of Illness/Injury or Date of Surgery  03/11/19  -DARREN     Referring Physician  Dr Obrien  -DARREN     Patient Observations  alert;cooperative;agree to therapy  -DARREN     Patient/Family Observations  spouse in room  -DARREN     General Observations of Patient  Fowlers in bed, alert  -DARREN     Prior Level of Function  independent:;all household mobility;ADL's;dressing;bathing  -DARREN     Equipment Currently Used at Home  none  -DARREN     Pertinent History of Current Functional Problem  SOB. Dx: ENNIS, CHF, severe left ventricular dysfunction, CAD. 3/11 cardiac cath.   -DARREN     Existing Precautions/Restrictions  fall  -DARREN     Risks Reviewed  patient:;spouse/S.O.:;LOB;nausea/vomiting;dizziness;increased discomfort;change in vital signs;lines disloged  -DARREN     Benefits Reviewed  patient:;spouse/S.O.:;improve function;increase independence;increase strength;increase balance;decrease pain;increase knowledge;improve skin integrity  -DARREN     Barriers to Rehab  medically complex  -DARREN     Row Name 03/13/19 1430          Relationship/Environment    Lives With  spouse  -DARREN     Row Name 03/13/19 1430          Resource/Environmental Concerns    Current Living Arrangements  home/apartment/condo lives on 1st level  -DARREN     Row Name 03/13/19 1430          Home Main Entrance    Number of Stairs, Main Entrance  three  -DARREN     Stair Railings, Main Entrance  none  -DARREN     Row Name 03/13/19 1430          Cognitive Assessment/Interventions    Additional Documentation  Cognitive Assessment/Intervention (Group)  -DARREN     Row Name 03/13/19 1430          Cognitive Assessment/Intervention- PT/OT    Affect/Mental Status (Cognitive)  WFL  -DARREN     Orientation Status (Cognition)  oriented x 4  -DARREN     Follows Commands (Cognition)  WFL  -DARREN     Personal Safety Interventions  fall prevention program maintained;muscle strengthening facilitated;nonskid shoes/slippers when out of bed  -DARREN     Row Name 03/13/19 1430          Safety Issues, Functional  Mobility    Impairments Affecting Function (Mobility)  balance;endurance/activity tolerance;strength  -DARREN     Row Name 03/13/19 1430          Bed Mobility Assessment/Treatment    Bed Mobility Assessment/Treatment  supine-sit;sit-supine  -DARREN     Supine-Sit [place] (Bed Mobility)  contact guard  -DARREN     Sit-Supine [place] (Bed Mobility)  contact guard  -DARREN     Assistive Device (Bed Mobility)  head of bed elevated  -DARREN     Row Name 03/13/19 1430          Transfer Assessment/Treatment    Transfer Assessment/Treatment  sit-stand transfer;stand-sit transfer  -DARREN     Sit-Stand [place] (Transfers)  contact guard  -DARREN     Stand-Sit [place] (Transfers)  contact guard  -DARREN     Row Name 03/13/19 1430          Gait/Stairs Assessment/Training    Gait/Stairs Assessment/Training  gait/ambulation independence;distance ambulated;gait pattern;gait deviations  -DARREN     [place] Level (Gait)  contact guard  -DARREN     Distance in Feet (Gait)  200  -DARREN     Pattern (Gait)  swing-through  -DARREN     Deviations/Abnormal Patterns (Gait)  gait speed decreased  -DARREN     Right Sided Gait Deviations  hip circumduction with fatigue  -DARREN     [facility] Name 03/13/19 1430          General ROM    GENERAL ROM COMMENTS  B LE AROM WFL  -DARREN     [facility] Name 03/13/19 1430          MMT (Manual Muscle Testing)    General MMT Comments  B LE functionally 4/5  -DARREN     [facility] Name 03/13/19 1430          Motor Assessment/Intervention    Additional Documentation  Balance (Group)  -DARREN     [facility] Name 03/13/19 1430          Balance    Balance  static sitting balance;static standing balance  -DARREN     [facility] Name 03/13/19 1430          Static Sitting Balance    Level of [place] (Unsupported Sitting, Static Balance)  supervision  -DARREN     Sitting Position (Unsupported Sitting, Static Balance)  sitting on edge of bed  -DARREN     [facility] Name 03/13/19 1430          Static Standing Balance    Level of [place] (Supported Standing, Static Balance)  contact guard assist  -DARREN      Row Name 03/13/19 1430          Sensory Assessment/Intervention    Sensory General Assessment  no sensation deficits identified per pt report  -DARREN     Row Name 03/13/19 1430          Pain Assessment    Additional Documentation  Pain Scale: Numbers Pre/Post-Treatment (Group)  -DARREN     Row Name 03/13/19 1430          Pain Scale: Numbers Pre/Post-Treatment    Pain Scale: Numbers, Pretreatment  0/10 - no pain  -DARREN     [facility] Name 03/13/19 1430          Health Promotion    Additional Documentation  Coping (Group);Plan of Care Review (Group)  -DARREN     Row Name 03/13/19 1430          Coping    Observed Emotional State  accepting;cooperative  -DARREN     [facility] Name 03/13/19 1430          Plan of Care Review    Plan of Care Reviewed With  patient;spouse  -DARREN [facility] Name 03/13/19 1430          Physical Therapy Clinical Impression    Date of Referral to PT  03/12/19  -DARREN     Patient/Family Goals Statement (PT Clinical Impression)  Go home after heart surgery  -DARREN     Criteria for Skilled Interventions Met (PT Clinical Impression)  yes;treatment indicated  -DARREN     Impairments Found (describe specific impairments)  gait, locomotion, and balance;aerobic capacity/endurance  -DARREN     Rehab Potential (PT Clinical Summary)  good, to achieve stated therapy goals  -DARREN     Predicted Duration of Therapy (PT)  until d/c  -DARREN     Care Plan Review (PT)  evaluation/treatment results reviewed;care plan/treatment goals reviewed;current/potential barriers reviewed;patient/other agree to care plan  -DARREN     Care Plan Review, Other Participant (PT Clinical Impression)  spouse  -DARREN Childress Name 03/13/19 1430          Vital Signs    Pre Systolic BP Rehab  141  -DARREN     Pre Treatment Diastolic BP  85  -DARREN     Pretreatment Heart Rate (beats/min)  83  -DARREN     Posttreatment Heart Rate (beats/min)  85  -DARREN     Pre SpO2 (%)  94  -DARREN     O2 Delivery Pre Treatment  room air  -DARREN     Post SpO2 (%)  94  -DARREN     O2 Delivery Post Treatment  room air  -DARREN     Pre Patient  Position  Supine  -DARREN     Intra Patient Position  Standing  -DARREN     Post Patient Position  Supine  -DARREN     Row Name 03/13/19 1430          Physical Therapy Goals    Bed Mobility Goal Selection (PT)  bed mobility, PT goal 1  -DARREN     Transfer Goal Selection (PT)  transfer, PT goal 1  -DARREN     Gait Training Goal Selection (PT)  gait training, PT goal 1  -DARREN     Stairs Goal Selection (PT)  stairs, PT goal 1  -DARREN     Additional Documentation  Stairs Goal Selection (PT) (Row)  -DARREN     Row Name 03/13/19 1430          Bed Mobility Goal 1 (PT)    Activity/Assistive Device (Bed Mobility Goal 1, PT)  sit to supine;supine to sit  -DARREN     [place] Level/Cues Needed (Bed Mobility Goal 1, PT)  supervision required  -DARREN     Time Frame (Bed Mobility Goal 1, PT)  long term goal (LTG);10 days  -DARREN     Progress/Outcomes (Bed Mobility Goal 1, PT)  goal ongoing  -DARREN     Row Name 03/13/19 1430          Transfer Goal 1 (PT)    Activity/Assistive Device (Transfer Goal 1, PT)  sit-to-stand/stand-to-sit;bed-to-chair/chair-to-bed  -DARREN     [place] Level/Cues Needed (Transfer Goal 1, PT)  supervision required  -DARREN     Time Frame (Transfer Goal 1, PT)  long term goal (LTG);10 days  -DARREN     Progress/Outcome (Transfer Goal 1, PT)  goal ongoing  -DARREN     Row Name 03/13/19 1430          Gait Training Goal 1 (PT)    Activity/Assistive Device (Gait Training Goal 1, PT)  gait (walking locomotion);decrease fall risk;improve balance and speed;increase endurance/gait distance  -DARREN     [place] Level (Gait Training Goal 1, PT)  supervision required  -DARREN     Distance (Gait Goal 1, PT)  300  -DARREN     Time Frame (Gait Training Goal 1, PT)  long term goal (LTG);10 days  -DARREN     Progress/Outcome (Gait Training Goal 1, PT)  goal ongoing  -DARREN     Row Name 03/13/19 1430          Stairs Goal 1 (PT)    Activity/Assistive Device (Stairs Goal 1, PT)  ascending stairs;descending stairs  -DARREN     [place] Level/Cues Needed (Stairs Goal 1, PT)  contact guard  assist  -DARREN     Number of Stairs (Stairs Goal 1, PT)  3  -DARREN     Time Frame (Stairs Goal 1, PT)  long term goal (LTG);10 days  -DARREN     Progress/Outcome (Stairs Goal 1, PT)  goal ongoing  -DARREN     Row Name 03/13/19 1430          Positioning and Restraints    Pre-Treatment Position  in bed  -DARREN     Post Treatment Position  bed  -DARREN     In Bed  notified nsg;fowlers;call light within reach;encouraged to call for assist;with family/caregiver  -DARREN     Row Name 03/13/19 1430          Living Environment    Home Accessibility  stairs to enter home  -DARREN       User Key  (r) = Recorded By, (t) = Taken By, (c) = Cosigned By    Initials Name Provider Type    Dinh Peace, PT DPT Physical Therapist        Physical Therapy Education     Title: PT OT SLP Therapies (In Progress)     Topic: Physical Therapy (In Progress)     Point: Mobility training (Done)     Learning Progress Summary           Patient Acceptance, E, VU,DU,NR by DARREN at 3/13/2019  2:30 PM    Comment:  Educated pt/spouse on progression of PT POC and benefits of activity. Answered pre questions about open heart surgery being performed tomorrow and educated on sternal precautions.   Significant Other Acceptance, E, VU,DU,NR by DARREN at 3/13/2019  2:30 PM    Comment:  Educated pt/spouse on progression of PT POC and benefits of activity. Answered pre questions about open heart surgery being performed tomorrow and educated on sternal precautions.                               User Key     Initials Effective Dates Name Provider Type Justin BANKS 08/02/16 -  Dinh Melgar, PT DPT Physical Therapist PT              PT Recommendation and Plan  Anticipated Discharge Disposition (PT): (will re-eval after open heart surgery)  Planned Therapy Interventions (PT Eval): bed mobility training, transfer training, gait training, balance training, home exercise program, patient/family education, postural re-education, stair training, strengthening  Therapy Frequency (PT  Clinical Impression): daily  Outcome Summary/Treatment Plan (PT)  Anticipated Discharge Disposition (PT): (will re-eval after open heart surgery)  Plan of Care Reviewed With: patient, spouse  Outcome Summary: PT eval completed. He was CGA for all bed mobility, sit <> stand transfer, and gait ~ 200 ft. With fatigue he would circumduct his R hip due to old CVA. He and his wife had general questions about his open heart surgery being performed tomorrow. PT also educated he and his wife on post op sternal precautions with PT. We will await post op orders to begin activity post op open heart surgery.   Outcome Measures     Row Name 03/13/19 1430             How much help from another person do you currently need...    Turning from your back to your side while in flat bed without using bedrails?  3  -DARREN      Moving from lying on back to sitting on the side of a flat bed without bedrails?  3  -DARREN      Moving to and from a bed to a chair (including a wheelchair)?  3  -DARREN      Standing up from a chair using your arms (e.g., wheelchair, bedside chair)?  3  -DARREN      Climbing 3-5 steps with a railing?  3  -DARREN      To walk in hospital room?  3  -DARREN      AM-PAC 6 Clicks Score  18  -DARREN         Functional Assessment    Outcome Measure Options  AM-PAC 6 Clicks Basic Mobility (PT)  -DARREN        User Key  (r) = Recorded By, (t) = Taken By, (c) = Cosigned By    Initials Name Provider Type    Dinh Peace PT DPT Physical Therapist         Time Calculation:   PT Charges     Row Name 03/13/19 1531             Time Calculation    Start Time  1430  -DARREN      Stop Time  1525  -DARREN      Time Calculation (min)  55 min  -DARREN      PT Received On  03/13/19  -DARREN      PT Goal Re-Cert Due Date  03/23/19  -DARREN        User Key  (r) = Recorded By, (t) = Taken By, (c) = Cosigned By    Initials Name Provider Type    Dinh Peace PT DPT Physical Therapist        Therapy Suggested Charges     Code   Minutes Charges    None           Therapy  Charges for Today     Code Description Service Date Service Provider Modifiers Qty    [id number] HC PT EVAL MOD COMPLEXITY 4 3/13/2019 Dinh Melgar, PT DPT GP 1          PT G-Codes  Outcome Measure Options: AM-PAC 6 Clicks Basic Mobility (PT)  AM-PAC 6 Clicks Score: 18      Dinh Melgar, PT DPT  3/13/2019

## 2019-03-13 NOTE — CONSULTS
PULMONARY & CRITICAL CARE CONSULT - [facility]    19, 11:46 AM  Patient Care Team:  Joshua Griggs MD as PCP - General (Family Medicine)  Name: Gerson Wilhelm  : 1945  MRN: [id number]  Contact Serial Number [id number]    Chief complaint: Dyspnea with exertion and need for preoperative pulmonary evaluation prior to possible coronary artery bypass graft surgery.  HPI:  We have been consulted by Markell Obrien MD to see this 73 y.o. male born on 1945. The patient had undergone cardiac catheterization by Dr. Obrien did have significant disease of the LAD.  A stent could not be placed and he is now being considered for bypass surgery.  He does have significant decrease in his left ventricular ejection fraction.  He has a former smoker having quit many years ago.  He has worked around asbestos.  He does state he has been told he snores and based on his body habitus could have underlying sleep apnea.  Bedside spirometry showed a significant restrictive defect and this was confirmed on complete pulmonary functions although his FVC and FEV1 were improved compared to his bedside spirometry.  He does have a large hiatal hernia noted on chest CT and I suspect his restriction relates in part to that and also relates to his weight.  I also s   Past Medical History:   has a past medical history of CHF (congestive heart failure) (CMS/[facility]), Diabetes mellitus (CMS/[facility]), GERD (gastroesophageal reflux disease), GI bleed, Hiatal hernia, Kidney stones, and Stroke (CMS/[facility]).   has a past surgical history that includes Cardiac Catheterization/Vascular Study (Left, 3/11/2019); Functional Flow Reserve (3/11/2019); and Left ventriculography (N/A, 3/11/2019).  No Known Allergies  Medications:    aspirin 81 mg Oral Daily   atorvastatin 40 mg Oral Nightly   carvedilol 3.125 mg Oral BID With Meals   escitalopram 20 mg Oral Daily   ferrous sulfate 325 mg Oral BID   furosemide 40 mg Oral Daily    glipiZIDE 10 mg Oral Daily   insulin detemir 34 Units Subcutaneous Nightly   pantoprazole 40 mg Oral Q AM   ramipril 10 mg Oral Daily   rOPINIRole 4 mg Oral Nightly       DOPamine 2-20 mcg/kg/min Last Rate: Stopped (03/12/19 0813)   sodium chloride 50 mL/hr Last Rate: 50 mL/hr (03/13/19 0040)     Family History:  History reviewed. No pertinent family history.  Social History:   reports that he has quit smoking. He uses smokeless tobacco. He reports that he does not drink alcohol or use drugs.  Review of Systems:  Review of Systems   Constitutional: Negative for chills and fever.   HENT: Negative.    Respiratory: Positive for shortness of breath.    Cardiovascular: Negative for chest pain.   Gastrointestinal: Negative.    Genitourinary: Negative.    Musculoskeletal: Negative.    Skin: Negative.    Neurological: Negative.    Psychiatric/Behavioral: Positive for sleep disturbance.        He does have a history of snoring.      Physical Exam:  Temp:  [97.2 °F (36.2 °C)-98.8 °F (37.1 °C)] 97.2 °F (36.2 °C)  Heart Rate:  [] 79  Resp:  [18-28] 22  BP: (102-161)/(57-99) 107/57    Intake/Output Summary (Last 24 hours) at 3/13/2019 1146  Last data filed at 3/13/2019 0811  Gross per 24 hour   Intake 1907 ml   Output 900 ml   Net 1007 ml         03/12/19  0005 03/12/19  1343 03/13/19  0525   Weight: 88.6 kg (195 lb 6.4 oz) (ccu bed, lowest position hob 30) 88.6 kg (195 lb 5.2 oz) 88.5 kg (195 lb)     SpO2 Percentage    03/13/19 0830 03/13/19 1055 03/13/19 1105   SpO2: 95% 94% 95%     Physical Exam   Constitutional: He is oriented to person, place, and time.   He is a pleasant somewhat overweight white male who appears in no acute distress.   HENT:   Head: Normocephalic.   He has some crowding of the posterior pharynx.   Eyes: EOM are normal. Pupils are equal, round, and reactive to light.   Neck: Normal range of motion. Neck supple.   Cardiovascular: Normal rate, regular rhythm and normal heart sounds.    Pulmonary/Chest: Effort normal.   Breath sounds are somewhat diminished at the bases.   Abdominal: Soft.   Musculoskeletal: Normal range of motion.   Lymphadenopathy:   No adenopathy is palpated.   Neurological: He is alert and oriented to person, place, and time.   Skin: Skin is warm and dry.   Psychiatric: He has a normal mood and affect.   Nursing note and vitals reviewed.    Results from last 7 days   Lab Units 03/12/19  0321 03/11/19  2346 03/11/19  1235   WBC 10*3/mm3 10.41 10.57 8.25   HEMOGLOBIN g/dL 13.0* 11.0* 13.9*   PLATELETS 10*3/mm3 226 181 207     Results from last 7 days   Lab Units 03/12/19  0321 03/11/19  1235   SODIUM mmol/L 140 140   POTASSIUM mmol/L 5.0 4.5   CO2 mmol/L 28.0 29.0   BUN mg/dL 18 18   CREATININE mg/dL 1.30 1.00   GLUCOSE mg/dL 269* 256*     Results from last 7 days   Lab Units 03/12/19  1629 03/11/19  2330   PH, ARTERIAL pH units 7.396 7.375   PCO2, ARTERIAL mm Hg 43.0 44.0   PO2 ART mm Hg 64.4* 84.5     No results found for: PROBNP     Recent radiology:   Imaging Results (last 72 hours)     Procedure Component Value Units Date/Time    CT Angiogram Chest With Contrast [[id number]] Collected:  03/12/19 1610     Updated:  03/12/19 1619    Narrative:       CT ANGIOGRAM CHEST W CONTRAST- 3/12/2019 3:57 PM CDT     HISTORY: Aortic dz, non-traumatic, known or suspect      COMPARISON: CT abdomen 11/2/2018     DOSE LENGTH PRODUCT: 595 mGy cm. Automated exposure control was also  utilized to decrease patient radiation dose.     TECHNIQUE: Axial images the chest are obtained following IV contrast.  2-D and maximal intensity projection images are reconstructed and  reviewed.     FINDINGS:  There are no pulmonary emboli. There is mild thoracic aortic  calcification with moderate coronary artery calcification. There is a  large hiatal hernia, similar to the 2018 CT exam. Heart is upper limits  of normal in size. No pericardial or pleural effusions are visualized.  There are nonspecific  subcentimeter size mediastinal lymph nodes. Few  calcified mediastinal and right hilar lymph nodes also noted. There is  no axillary lymphadenopathy.     Limited images of the upper abdomen demonstrate hyperdense material  within the lumen of gallbladder. This could be vicarious excretion of  contrast into the gallbladder versus biliary sludge. The adrenal glands  are similar to the 2018 study. There are left renal cysts.     There is chronic peripheral interstitial lung change suspected. There is  patchy bibasilar atelectasis. There is volume loss of the right  hemithorax related to the large hernia. No endobronchial lesions are  identified. There is no pneumothorax.       Impression:       1. Large hiatal hernia with bibasilar atelectasis. Chronic peripheral  interstitial lung change.  2. No pulmonary emboli.  3. No thoracic aortic aneurysm or dissection. Mild thoracic aortic  calcification with moderate coronary artery calcification.   4. Hyperdense material within the lumen of the gallbladder could be  vicarious excretion of contrast. Biliary sludge second possibility.  This report was finalized on 03/12/2019 16:16 by Dr. Mini Hyde MD.    US Carotid Bilateral [[id number]] Collected:  03/12/19 1449     Updated:  03/12/19 1453    Narrative:       History: Carotid occlusive disease       Impression:       Impression:  1. There is less than 50% stenosis of the right internal carotid artery.  2. There is less than 50% stenosis of the left internal carotid artery.  3. Antegrade flow is demonstrated in bilateral vertebral arteries.     Comments: Bilateral carotid vertebral arterial duplex scan was  performed.     Grayscale imaging shows intimal thickening and calcified elements at the  carotid bifurcation. The right internal carotid artery peak systolic  velocity is 54.5 cm/sec. The end-diastolic velocity is 19.3 cm/sec. The  right ICA/CCA ratio is approximately 0.75 . These findings correlate  with less than 50%  stenosis of the right internal carotid artery.     Grayscale imaging shows intimal thickening and calcified elements at the  carotid bifurcation. The left internal carotid artery peak systolic  velocity is 54.2 cm/sec. The end-diastolic velocity is 16.5 cm/sec. The  left ICA/CCA ratio is approximately 0.78 . These findings correlate with  less than 50% stenosis of the left internal carotid artery.     Antegrade flow is demonstrated in bilateral vertebral arteries.       This report was finalized on 03/12/2019 14:50 by Dr. Carlitos Muñiz MD.    XR Chest 1 View [[id number]] Collected:  03/12/19 0748     Updated:  03/12/19 0753    Narrative:       EXAMINATION: XR CHEST 1 VW-     3/11/2019 11:40 PM CDT     HISTORY: rrt; R06.09-Other forms of dyspnea.     One view chest x-ray compared with 11/2/2018.     Magnified heart size. Aortic arch calcification.     Chronic opacity at the right lung base was seen to represent  intrathoracic stomach on a CT exam from 4 months ago.     The upper lobes are essentially clear.     There is no pneumothorax and no overt heart failure.     Summary:  1. Intrathoracic stomach with obscuration of the right lung base. No  definite pneumonia, pneumothorax, or heart failure.  This report was finalized on 03/12/2019 07:50 by Dr. Cuong Dias MD.    XR Abdomen KUB [[id number]] Collected:  03/12/19 0716     Updated:  03/12/19 0722    Narrative:       EXAMINATION: XR ABDOMEN KUB- 3/12/2019 7:16 AM CDT     HISTORY: nausea/ vomiting; post cath; R06.09-Other forms of dyspnea.     REPORT: A supine view of the abdomen is compared with CT of the abdomen  and pelvis 11/2/2018.     Moderate stool volume is present. There is mild dilation of the long  loop of the colon centrally, the maximum diameter of approximately 8.8  cm. No free air is identified on the supine view. Contrast is noted  within the urinary tract bladder. There is mild levoscoliosis of the  lumbar spine. No acute osseous findings.        Impression:       Nonspecific bowel gas pattern with mild dilation of a single  loop of colon centrally. Probable constipation.  This report was finalized on 2019 07:19 by Dr. Renny Whitman MD.        My radiograph interpretation/independent review of imaging: Chest CT does show evidence of a large hiatal hernia with some bilateral basilar atelectatic changes.  Other test results (not lab or imaging): Pulmonary function studies reveal a moderate bordering on severe restrictive ventilatory defect.  Independent review of ekg: Sinus bradycardia with left anterior fascicular block and some ST and T wave changes inferiorly and laterally.  Problem List as identified by Epic (may contain historical, inactive problems)  Patient Active Problem List   Diagnosis   • ENNIS (dyspnea on exertion)   • Coronary artery disease involving native coronary artery of native heart without angina pectoris   • History of stroke   • Type 2 diabetes mellitus with circulatory disorder, with long-term current use of insulin (CMS/[facility])   • Essential hypertension   • Smokeless tobacco use     Pulmonary Assessment:  New problem (to me), with additional workup planned:  1.  Restrictive lung disease likely related to his large hiatal hernia and probably also related to some extent to his weight and some associated atelectasis.  2.  Snoring, he may very well have sleep apnea.  New problem (to me), no additional workup planned:  1.  Type 2 diabetes.  Other problems either stable, failing to improve or worsenin. Coronary artery disease.  2. Decreased LV function.    Recommend/plan:   · I have ordered flutter valve therapy and he may continue this along with incentive spirometry.  He will be at some increased risk for postoperative pulmonary complication because of his restrictive lung disease but I certainly think he is an acceptable candidate for bypass surgery and I did discuss this with Dr. Orona.  His blood gases did show some degree of  hypoxemia but no CO2 retention.  Again, I think his restriction relates predominantly to anatomic factors and not any intrinsic pulmonary parenchymal disease other than possibly some atelectasis.    Thank you for this consult.  We will follow along.  Electronically signed by Rashaad Loera MD, 03/13/19, 11:46 AM

## 2019-03-13 NOTE — PROGRESS NOTES
"CC: shortness of breath    Up in the chair this morning with wife at bedside. No events overnight. Denies chest pain and worsening shortness of breath. Dopamine gtt off since yesterday. On room air.     Telemetry: NSR 70s    Visit Vitals  /81   Pulse 82   Temp 97.9 °F (36.6 °C) (Oral)   Resp 20   Ht 177.8 cm (70\")   Wt 88.5 kg (195 lb)   SpO2 95%   BMI 27.98 kg/m²       Intake/Output Summary (Last 24 hours) at 3/13/2019 0832  Last data filed at 3/13/2019 0811  Gross per 24 hour   Intake 2314.5 ml   Output 1275 ml   Net 1039.5 ml     US Carotid Bilateral [[id number]] [place] as Reviewed   Order Status: Completed Collected: 03/12/19 1449    Updated: 03/12/19 1453   Narrative:     History: Carotid occlusive disease      Impression:     Impression:  1. There is less than 50% stenosis of the right internal carotid artery.  2. There is less than 50% stenosis of the left internal carotid artery.  3. Antegrade flow is demonstrated in bilateral vertebral arteries.     Comments: Bilateral carotid vertebral arterial duplex scan was  performed.     Grayscale imaging shows intimal thickening and calcified elements at the  carotid bifurcation. The right internal carotid artery peak systolic  velocity is 54.5 cm/sec. The end-diastolic velocity is 19.3 cm/sec. The  right ICA/CCA ratio is approximately 0.75 . These findings correlate  with less than 50% stenosis of the right internal carotid artery.     Grayscale imaging shows intimal thickening and calcified elements at the  carotid bifurcation. The left internal carotid artery peak systolic  velocity is 54.2 cm/sec. The end-diastolic velocity is 16.5 cm/sec. The  left ICA/CCA ratio is approximately 0.78 . These findings correlate with  less than 50% stenosis of the left internal carotid artery.     Antegrade flow is demonstrated in bilateral vertebral arteries.       This report was finalized on 03/12/2019 14:50 by Dr. Carlitos Muñiz MD     CT ANGIOGRAM CHEST W CONTRAST- " 3/12/2019 3:57 PM CDT     HISTORY: Aortic dz, non-traumatic, known or suspect      COMPARISON: CT abdomen 11/2/2018     DOSE LENGTH PRODUCT: 595 mGy cm. Automated exposure control was also  utilized to decrease patient radiation dose.     TECHNIQUE: Axial images the chest are obtained following IV contrast.  2-D and maximal intensity projection images are reconstructed and  reviewed.     FINDINGS:  There are no pulmonary emboli. There is mild thoracic aortic  calcification with moderate coronary artery calcification. There is a  large hiatal hernia, similar to the 2018 CT exam. Heart is upper limits  of normal in size. No pericardial or pleural effusions are visualized.  There are nonspecific subcentimeter size mediastinal lymph nodes. Few  calcified mediastinal and right hilar lymph nodes also noted. There is  no axillary lymphadenopathy.     Limited images of the upper abdomen demonstrate hyperdense material  within the lumen of gallbladder. This could be vicarious excretion of  contrast into the gallbladder versus biliary sludge. The adrenal glands  are similar to the 2018 study. There are left renal cysts.     There is chronic peripheral interstitial lung change suspected. There is  patchy bibasilar atelectasis. There is volume loss of the right  hemithorax related to the large hernia. No endobronchial lesions are  identified. There is no pneumothorax.     IMPRESSION:  1. Large hiatal hernia with bibasilar atelectasis. Chronic peripheral  interstitial lung change.  2. No pulmonary emboli.  3. No thoracic aortic aneurysm or dissection. Mild thoracic aortic  calcification with moderate coronary artery calcification.   4. Hyperdense material within the lumen of the gallbladder could be  vicarious excretion of contrast. Biliary sludge second possibility.  This report was finalized on 03/12/2019 16:16 by Dr. Mini Hyde MD.          Echocardiogram Findings     Left Ventricle Estimated EF = 33%. Normal left  ventricular cavity size and wall thickness noted. The following left ventricular wall segments are dyskinetic: mid anterior, apical anterior, apical lateral, apical inferior, apical septal and apex.  Left ventricular diastolic dysfunction is noted.   Right Ventricle Normal right ventricular cavity size, wall thickness, systolic function and septal motion noted.   Left Atrium Left atrial cavity size is moderately dilated. Left atrial volume is moderately increased.   Right Atrium Normal right atrial size noted.   Aortic Valve The aortic valve is structurally normal. No aortic valve regurgitation is present. No aortic valve stenosis is present.   Mitral Valve The mitral valve is normal in structure. No mitral valve regurgitation is present. No significant mitral valve stenosis is present.   Tricuspid Valve The tricuspid valve is grossly normal. No evidence of tricuspid valve stenosis is present. Physiologic tricuspid valve regurgitation is present. Estimated right ventricular systolic pressure from tricuspid regurgitation is normal (<35 mmHg). No evidence of pulmonary hypertension is present.   Pulmonic Valve The pulmonic valve is structurally normal. There is no significant pulmonic valve stenosis present. There is no pulmonic valve regurgitation present.   Pericardium The pericardium is normal. There is no evidence of pericardial effusion.       Physical Exam:  General: No apparent distress. In good spirits.   Cardiovascular: Regular rate and rhythm without murmur, rubs, or gallops.    Pulmonary: Clear to auscultation bilaterally without wheezing, rubs, or rales.  Abdomen: Soft, non-distended, and non-tender.  Extremities: Warm, moves all extremities. No edema.   Neurologic: Grossly intact with no focal deficits.       Impression:  Coronary artery disease  Type 2 diabetes mellitus, Hemoglobin A1c 8.4%  History of stroke  Shortness of breath  Left ventricular dysfunction  Large hiatal hernia   Restrictive lung  disease    Plan:   Patient was able to ambulate around the CCU independently.   Will discuss echo results with Dr. Obrien-may need additional viability testing  Obtain formal PFTs  Discussed with patient, wife and nursing. Will follow up later after echo has been discussed with Dr. Obrien

## 2019-03-14 ENCOUNTER — APPOINTMENT (OUTPATIENT)
Dept: CARDIOLOGY | Facility: HOSPITAL | Age: 74
End: 2019-03-14

## 2019-03-14 ENCOUNTER — APPOINTMENT (OUTPATIENT)
Dept: GENERAL RADIOLOGY | Facility: HOSPITAL | Age: 74
End: 2019-03-14

## 2019-03-14 ENCOUNTER — ANESTHESIA (OUTPATIENT)
Dept: PERIOP | Facility: HOSPITAL | Age: 74
End: 2019-03-14

## 2019-03-14 ENCOUNTER — ANESTHESIA EVENT (OUTPATIENT)
Dept: PERIOP | Facility: HOSPITAL | Age: 74
End: 2019-03-14

## 2019-03-14 LAB
A-A DO2: ABNORMAL MMHG
ALBUMIN SERPL-MCNC: 3.3 G/DL (ref 3.5–5)
ALBUMIN/GLOB SERPL: 1.6 G/DL (ref 1.1–2.5)
ALP SERPL-CCNC: 77 U/L (ref 24–120)
ALT SERPL W P-5'-P-CCNC: 15 U/L (ref 0–54)
ANION GAP SERPL CALCULATED.3IONS-SCNC: 10 MMOL/L (ref 4–13)
ANION GAP SERPL CALCULATED.3IONS-SCNC: 8 MMOL/L (ref 4–13)
ANION GAP SERPL CALCULATED.3IONS-SCNC: 8 MMOL/L (ref 4–13)
APTT PPP: 23 SECONDS (ref 24.1–34.8)
APTT PPP: 36.6 SECONDS (ref 24.1–34.8)
APTT PPP: 37.5 SECONDS (ref 24.1–34.8)
ARTERIAL PATENCY WRIST A: ABNORMAL
ARTERIAL PATENCY WRIST A: NORMAL
ARTERIAL PATENCY WRIST A: NORMAL
AST SERPL-CCNC: 15 U/L (ref 7–45)
ATMOSPHERIC PRESS: 740 MMHG
ATMOSPHERIC PRESS: 741 MMHG
ATMOSPHERIC PRESS: 742 MMHG
ATMOSPHERIC PRESS: 745 MMHG
ATMOSPHERIC PRESS: 747 MMHG
ATMOSPHERIC PRESS: 748 MMHG
ATMOSPHERIC PRESS: 749 MMHG
BASE EXCESS BLDA CALC-SCNC: -1.5 MMOL/L (ref 0–2)
BASE EXCESS BLDA CALC-SCNC: 0.1 MMOL/L (ref 0–2)
BASE EXCESS BLDA CALC-SCNC: 0.1 MMOL/L (ref 0–2)
BASE EXCESS BLDA CALC-SCNC: 0.6 MMOL/L (ref 0–2)
BASE EXCESS BLDA CALC-SCNC: 0.6 MMOL/L (ref 0–2)
BASE EXCESS BLDA CALC-SCNC: 1.5 MMOL/L (ref 0–2)
BASE EXCESS BLDA CALC-SCNC: 2 MMOL/L (ref 0–2)
BASE EXCESS BLDA CALC-SCNC: 2.2 MMOL/L (ref 0–2)
BASE EXCESS BLDA CALC-SCNC: 3.2 MMOL/L (ref 0–2)
BASE EXCESS BLDA CALC-SCNC: 3.7 MMOL/L (ref 0–2)
BASE EXCESS BLDA CALC-SCNC: 4.7 MMOL/L (ref 0–2)
BASE EXCESS BLDV CALC-SCNC: 6.2 MMOL/L (ref 0–2)
BASOPHILS # BLD AUTO: 0.03 10*3/MM3 (ref 0–0.2)
BASOPHILS # BLD AUTO: 0.05 10*3/MM3 (ref 0–0.2)
BASOPHILS # BLD MANUAL: 0.13 10*3/MM3 (ref 0–0.2)
BASOPHILS NFR BLD AUTO: 0.4 % (ref 0–2)
BASOPHILS NFR BLD AUTO: 0.5 % (ref 0–2)
BASOPHILS NFR BLD AUTO: 2 % (ref 0–2)
BDY SITE: ABNORMAL
BDY SITE: NORMAL
BDY SITE: NORMAL
BILIRUB SERPL-MCNC: 0.7 MG/DL (ref 0.1–1)
BODY TEMPERATURE: 37 C
BUN BLD-MCNC: 13 MG/DL (ref 5–21)
BUN BLD-MCNC: 15 MG/DL (ref 5–21)
BUN BLD-MCNC: 17 MG/DL (ref 5–21)
BUN/CREAT SERPL: 12.7 (ref 7–25)
BUN/CREAT SERPL: 13.7 (ref 7–25)
BUN/CREAT SERPL: 15.6 (ref 7–25)
CA-I BLD-MCNC: 4.13 MG/DL (ref 4.6–5.4)
CA-I BLD-MCNC: 4.5 MG/DL (ref 4.6–5.4)
CA-I BLD-MCNC: 4.51 MG/DL (ref 4.6–5.4)
CA-I BLD-MCNC: 4.55 MG/DL (ref 4.6–5.4)
CA-I BLD-MCNC: 4.71 MG/DL (ref 4.6–5.4)
CA-I BLD-MCNC: 4.83 MG/DL (ref 4.6–5.4)
CALCIUM SPEC-SCNC: 8.6 MG/DL (ref 8.4–10.4)
CALCIUM SPEC-SCNC: 8.6 MG/DL (ref 8.4–10.4)
CALCIUM SPEC-SCNC: 8.7 MG/DL (ref 8.4–10.4)
CHLORIDE SERPL-SCNC: 105 MMOL/L (ref 98–110)
CHLORIDE SERPL-SCNC: 106 MMOL/L (ref 98–110)
CHLORIDE SERPL-SCNC: 107 MMOL/L (ref 98–110)
CO2 SERPL-SCNC: 25 MMOL/L (ref 24–31)
CO2 SERPL-SCNC: 26 MMOL/L (ref 24–31)
CO2 SERPL-SCNC: 29 MMOL/L (ref 24–31)
COHGB MFR BLD: 1 % (ref 0–5)
COHGB MFR BLD: 1.1 % (ref 0–5)
COHGB MFR BLD: 1.1 % (ref 0–5)
COHGB MFR BLD: 1.2 % (ref 0–5)
COHGB MFR BLD: 1.6 % (ref 0–5)
CREAT BLD-MCNC: 0.95 MG/DL (ref 0.5–1.4)
CREAT BLD-MCNC: 0.96 MG/DL (ref 0.5–1.4)
CREAT BLD-MCNC: 1.34 MG/DL (ref 0.5–1.4)
DEPRECATED RDW RBC AUTO: 42.9 FL (ref 40–54)
DEPRECATED RDW RBC AUTO: 46.5 FL (ref 40–54)
DEPRECATED RDW RBC AUTO: 46.9 FL (ref 40–54)
EOSINOPHIL # BLD AUTO: 0.23 10*3/MM3 (ref 0–0.7)
EOSINOPHIL # BLD AUTO: 0.51 10*3/MM3 (ref 0–0.7)
EOSINOPHIL # BLD MANUAL: 0.63 10*3/MM3 (ref 0–0.7)
EOSINOPHIL NFR BLD AUTO: 4 % (ref 0–4)
EOSINOPHIL NFR BLD AUTO: 4.2 % (ref 0–4)
EOSINOPHIL NFR BLD MANUAL: 10.1 % (ref 0–4)
ERYTHROCYTE [DISTWIDTH] IN BLOOD BY AUTOMATED COUNT: 13.8 % (ref 12–15)
ERYTHROCYTE [DISTWIDTH] IN BLOOD BY AUTOMATED COUNT: 15.1 % (ref 12–15)
ERYTHROCYTE [DISTWIDTH] IN BLOOD BY AUTOMATED COUNT: 15.4 % (ref 12–15)
GAS FLOW AIRWAY: 2.3 LPM
GAS FLOW AIRWAY: 2.5 LPM
GFR SERPL CREATININE-BSD FRML MDRD: 52 ML/MIN/1.73
GFR SERPL CREATININE-BSD FRML MDRD: 77 ML/MIN/1.73
GFR SERPL CREATININE-BSD FRML MDRD: 78 ML/MIN/1.73
GLOBULIN UR ELPH-MCNC: 2.1 GM/DL
GLUCOSE BLD-MCNC: 142 MG/DL (ref 70–100)
GLUCOSE BLD-MCNC: 159 MG/DL (ref 70–100)
GLUCOSE BLD-MCNC: 239 MG/DL (ref 70–100)
GLUCOSE BLDC GLUCOMTR-MCNC: 101 MG/DL (ref 70–130)
GLUCOSE BLDC GLUCOMTR-MCNC: 123 MG/DL (ref 70–130)
GLUCOSE BLDC GLUCOMTR-MCNC: 188 MG/DL (ref 70–130)
GLUCOSE BLDC GLUCOMTR-MCNC: 223 MG/DL (ref 70–130)
GLUCOSE BLDC GLUCOMTR-MCNC: 225 MG/DL (ref 70–130)
GLUCOSE BLDC GLUCOMTR-MCNC: 231 MG/DL (ref 70–130)
GLUCOSE BLDC GLUCOMTR-MCNC: 95 MG/DL (ref 70–130)
HCO3 BLDA-SCNC: 24.4 MMOL/L (ref 20–26)
HCO3 BLDA-SCNC: 25.2 MMOL/L (ref 20–26)
HCO3 BLDA-SCNC: 25.3 MMOL/L (ref 20–26)
HCO3 BLDA-SCNC: 25.3 MMOL/L (ref 20–26)
HCO3 BLDA-SCNC: 25.4 MMOL/L (ref 20–26)
HCO3 BLDA-SCNC: 25.4 MMOL/L (ref 20–26)
HCO3 BLDA-SCNC: 26.1 MMOL/L (ref 20–26)
HCO3 BLDA-SCNC: 26.5 MMOL/L (ref 20–26)
HCO3 BLDA-SCNC: 27.6 MMOL/L (ref 20–26)
HCO3 BLDA-SCNC: 28 MMOL/L (ref 20–26)
HCO3 BLDA-SCNC: 29.2 MMOL/L (ref 20–26)
HCO3 BLDV-SCNC: 30.9 MMOL/L (ref 22–28)
HCT VFR BLD AUTO: 31.6 % (ref 40–52)
HCT VFR BLD AUTO: 31.7 % (ref 40–52)
HCT VFR BLD AUTO: 34.3 % (ref 40–52)
HCT VFR BLD CALC: 24.3 % (ref 38–51)
HCT VFR BLD CALC: 27.6 % (ref 38–51)
HCT VFR BLD CALC: 31.9 % (ref 38–51)
HCT VFR BLD CALC: 33.1 % (ref 38–51)
HGB BLD-MCNC: 10.9 G/DL (ref 14–18)
HGB BLD-MCNC: 11 G/DL (ref 14–18)
HGB BLD-MCNC: 11.8 G/DL (ref 14–18)
HGB BLDA-MCNC: 10.4 G/DL (ref 14–18)
HGB BLDA-MCNC: 10.8 G/DL (ref 14–18)
HGB BLDA-MCNC: 7.9 G/DL (ref 14–18)
HGB BLDA-MCNC: 8.1 G/DL (ref 14–18)
HGB BLDA-MCNC: 9 G/DL (ref 14–18)
HOROWITZ INDEX BLD+IHG-RTO: 100 %
HOROWITZ INDEX BLD+IHG-RTO: 30 %
HOROWITZ INDEX BLD+IHG-RTO: 35 %
HOROWITZ INDEX BLD+IHG-RTO: 45 %
HOROWITZ INDEX BLD+IHG-RTO: 50 %
HOROWITZ INDEX BLD+IHG-RTO: 60 %
IMM GRANULOCYTES # BLD AUTO: 0.02 10*3/MM3 (ref 0–0.05)
IMM GRANULOCYTES # BLD AUTO: 0.06 10*3/MM3 (ref 0–0.05)
IMM GRANULOCYTES NFR BLD AUTO: 0.3 % (ref 0–5)
IMM GRANULOCYTES NFR BLD AUTO: 0.5 % (ref 0–5)
INR PPP: 0.93 (ref 0.91–1.09)
INR PPP: 1.26 (ref 0.91–1.09)
INR PPP: 1.35 (ref 0.91–1.09)
LYMPHOCYTES # BLD AUTO: 0.86 10*3/MM3 (ref 0.72–4.86)
LYMPHOCYTES # BLD AUTO: 0.92 10*3/MM3 (ref 0.72–4.86)
LYMPHOCYTES # BLD MANUAL: 1.58 10*3/MM3 (ref 0.72–4.86)
LYMPHOCYTES NFR BLD AUTO: 15 % (ref 15–45)
LYMPHOCYTES NFR BLD AUTO: 7.6 % (ref 15–45)
LYMPHOCYTES NFR BLD MANUAL: 25.3 % (ref 15–45)
LYMPHOCYTES NFR BLD MANUAL: 5.1 % (ref 4–12)
Lab: ABNORMAL
Lab: NORMAL
MCH RBC QN AUTO: 29.6 PG (ref 28–32)
MCH RBC QN AUTO: 29.9 PG (ref 28–32)
MCH RBC QN AUTO: 30 PG (ref 28–32)
MCHC RBC AUTO-ENTMCNC: 34.4 G/DL (ref 33–36)
MCHC RBC AUTO-ENTMCNC: 34.5 G/DL (ref 33–36)
MCHC RBC AUTO-ENTMCNC: 34.7 G/DL (ref 33–36)
MCV RBC AUTO: 86 FL (ref 82–95)
MCV RBC AUTO: 86.4 FL (ref 82–95)
MCV RBC AUTO: 86.8 FL (ref 82–95)
METHGB BLD QL: 0.5 % (ref 0–3)
METHGB BLD QL: 0.8 % (ref 0–3)
METHGB BLD QL: 0.9 % (ref 0–3)
METHGB BLD QL: 1.1 % (ref 0–3)
METHGB BLD QL: 1.2 % (ref 0–3)
MODALITY: ABNORMAL
MODALITY: NORMAL
MODALITY: NORMAL
MONOCYTES # BLD AUTO: 0.27 10*3/MM3 (ref 0.19–1.3)
MONOCYTES # BLD AUTO: 0.32 10*3/MM3 (ref 0.19–1.3)
MONOCYTES # BLD AUTO: 0.78 10*3/MM3 (ref 0.19–1.3)
MONOCYTES NFR BLD AUTO: 4.7 % (ref 4–12)
MONOCYTES NFR BLD AUTO: 6.4 % (ref 4–12)
NEUTROPHILS # BLD AUTO: 3.48 10*3/MM3 (ref 1.87–8.4)
NEUTROPHILS # BLD AUTO: 4.33 10*3/MM3 (ref 1.87–8.4)
NEUTROPHILS # BLD AUTO: 9.83 10*3/MM3 (ref 1.87–8.4)
NEUTROPHILS NFR BLD AUTO: 75.5 % (ref 39–78)
NEUTROPHILS NFR BLD AUTO: 80.9 % (ref 39–78)
NEUTROPHILS NFR BLD MANUAL: 55.6 % (ref 39–78)
NOTE: ABNORMAL
NRBC BLD AUTO-RTO: 0 /100 WBC (ref 0–0)
NRBC BLD AUTO-RTO: 0 /100 WBC (ref 0–0)
OXYHGB MFR BLDV: 75.4 % (ref 60–85)
OXYHGB MFR BLDV: 97.8 % (ref 94–99)
OXYHGB MFR BLDV: 98.2 % (ref 94–99)
OXYHGB MFR BLDV: >98.5 % (ref 94–99)
OXYHGB MFR BLDV: >98.5 % (ref 94–99)
PCO2 BLDA: 34 MM HG (ref 35–45)
PCO2 BLDA: 34.2 MM HG (ref 35–45)
PCO2 BLDA: 34.8 MM HG (ref 35–45)
PCO2 BLDA: 35.3 MM HG (ref 35–45)
PCO2 BLDA: 35.8 MM HG (ref 35–45)
PCO2 BLDA: 38.4 MM HG (ref 35–45)
PCO2 BLDA: 42.2 MM HG (ref 35–45)
PCO2 BLDA: 42.6 MM HG (ref 35–45)
PCO2 BLDA: 44.6 MM HG (ref 35–45)
PCO2 BLDA: 49.7 MM HG (ref 35–45)
PCO2 BLDA: 58.4 MM HG (ref 35–45)
PCO2 BLDV: 44.9 MM HG (ref 41–51)
PCO2 TEMP ADJ BLD: ABNORMAL MM HG (ref 35–45)
PEEP RESPIRATORY: 5 CM[H2O]
PEEP RESPIRATORY: 6 CM[H2O]
PEEP RESPIRATORY: 6 CM[H2O]
PH BLDA: 7.26 PH UNITS (ref 7.35–7.45)
PH BLDA: 7.38 PH UNITS (ref 7.35–7.45)
PH BLDA: 7.45 PH UNITS (ref 7.35–7.45)
PH BLDA: 7.46 PH UNITS (ref 7.35–7.45)
PH BLDA: 7.47 PH UNITS (ref 7.35–7.45)
PH BLDA: 7.48 PH UNITS (ref 7.35–7.45)
PH BLDA: 7.48 PH UNITS (ref 7.35–7.45)
PH BLDA: 7.51 PH UNITS (ref 7.35–7.45)
PH BLDV: 7.45 PH UNITS (ref 7.32–7.42)
PH, TEMP CORRECTED: ABNORMAL PH UNITS (ref 7.35–7.45)
PLATELET # BLD AUTO: 107 10*3/MM3 (ref 130–400)
PLATELET # BLD AUTO: 113 10*3/MM3 (ref 130–400)
PLATELET # BLD AUTO: 170 10*3/MM3 (ref 130–400)
PMV BLD AUTO: 10 FL (ref 6–12)
PMV BLD AUTO: 10.4 FL (ref 6–12)
PMV BLD AUTO: 9.9 FL (ref 6–12)
PO2 BLDA: 111 MM HG (ref 83–108)
PO2 BLDA: 117 MM HG (ref 83–108)
PO2 BLDA: 119 MM HG (ref 83–108)
PO2 BLDA: 221 MM HG (ref 83–108)
PO2 BLDA: 238 MM HG (ref 83–108)
PO2 BLDA: 454 MM HG (ref 83–108)
PO2 BLDA: 82.9 MM HG (ref 83–108)
PO2 BLDA: 84.4 MM HG (ref 83–108)
PO2 BLDA: 89.8 MM HG (ref 83–108)
PO2 BLDA: 96.2 MM HG (ref 83–108)
PO2 BLDA: >547 MM HG (ref 83–108)
PO2 BLDV: 39.7 MM HG (ref 27–53)
PO2 TEMP ADJ BLD: ABNORMAL MM HG (ref 83–108)
POTASSIUM BLD-SCNC: 3.9 MMOL/L (ref 3.5–5.3)
POTASSIUM BLD-SCNC: 4.1 MMOL/L (ref 3.5–5.3)
POTASSIUM BLD-SCNC: 4.4 MMOL/L (ref 3.5–5.3)
POTASSIUM BLDA-SCNC: 3.8 MMOL/L (ref 3.5–5.2)
POTASSIUM BLDA-SCNC: 3.9 MMOL/L (ref 3.5–5.2)
POTASSIUM BLDA-SCNC: 4.3 MMOL/L (ref 3.5–5.2)
POTASSIUM BLDA-SCNC: 4.6 MMOL/L (ref 3.5–5.2)
POTASSIUM BLDV-SCNC: 4 MMOL/L (ref 3.5–5.2)
PROT SERPL-MCNC: 5.4 G/DL (ref 6.3–8.7)
PROTHROMBIN TIME: 12.7 SECONDS (ref 11.9–14.6)
PROTHROMBIN TIME: 16.2 SECONDS (ref 11.9–14.6)
PROTHROMBIN TIME: 17.1 SECONDS (ref 11.9–14.6)
PSV: 10 CMH2O
RBC # BLD AUTO: 3.64 10*6/MM3 (ref 4.8–5.9)
RBC # BLD AUTO: 3.67 10*6/MM3 (ref 4.8–5.9)
RBC # BLD AUTO: 3.99 10*6/MM3 (ref 4.8–5.9)
RBC MORPH BLD: NORMAL
SAO2 % BLDCOA: 100.1 % (ref 94–99)
SAO2 % BLDCOA: 94.7 % (ref 94–99)
SAO2 % BLDCOA: 96.7 % (ref 94–99)
SAO2 % BLDCOA: 97.1 % (ref 94–99)
SAO2 % BLDCOA: 98.3 % (ref 94–99)
SAO2 % BLDCOA: 98.7 % (ref 94–99)
SAO2 % BLDCOA: 98.8 % (ref 94–99)
SAO2 % BLDCOA: 99.1 % (ref 94–99)
SAO2 % BLDCOA: >100.1 % (ref 94–99)
SAO2 % BLDCOV: 77.5 % (ref 45–75)
SET MECH RESP RATE: 10
SET MECH RESP RATE: 12
SET MECH RESP RATE: 14
SET MECH RESP RATE: 18
SET MECH RESP RATE: 20
SET MECH RESP RATE: 20
SET MECH RESP RATE: 22
SMALL PLATELETS BLD QL SMEAR: ABNORMAL
SODIUM BLD-SCNC: 140 MMOL/L (ref 135–145)
SODIUM BLD-SCNC: 141 MMOL/L (ref 135–145)
SODIUM BLD-SCNC: 143 MMOL/L (ref 135–145)
SODIUM BLDA-SCNC: 140 MMOL/L (ref 136–145)
SODIUM BLDA-SCNC: 141 MMOL/L (ref 136–145)
SODIUM BLDA-SCNC: 142 MMOL/L (ref 136–145)
SODIUM BLDA-SCNC: 142 MMOL/L (ref 136–145)
SODIUM BLDV-SCNC: 143 MMOL/L (ref 136–145)
VARIANT LYMPHS NFR BLD MANUAL: 2 % (ref 0–5)
VENTILATOR MODE: ABNORMAL
VENTILATOR MODE: NORMAL
VENTILATOR MODE: NORMAL
VT ON VENT VENT: 550 ML
VT ON VENT VENT: 600 ML
WBC MORPH BLD: NORMAL
WBC NRBC COR # BLD: 12.15 10*3/MM3 (ref 4.8–10.8)
WBC NRBC COR # BLD: 5.74 10*3/MM3 (ref 4.8–10.8)
WBC NRBC COR # BLD: 6.25 10*3/MM3 (ref 4.8–10.8)

## 2019-03-14 PROCEDURE — 93318 ECHO TRANSESOPHAGEAL INTRAOP: CPT | Performed by: NURSE ANESTHETIST, CERTIFIED REGISTERED

## 2019-03-14 PROCEDURE — 82375 ASSAY CARBOXYHB QUANT: CPT

## 2019-03-14 PROCEDURE — 06BQ0ZZ EXCISION OF LEFT SAPHENOUS VEIN, OPEN APPROACH: ICD-10-PCS | Performed by: THORACIC SURGERY (CARDIOTHORACIC VASCULAR SURGERY)

## 2019-03-14 PROCEDURE — 25010000002 MORPHINE (PF) 10 MG/ML SOLUTION

## 2019-03-14 PROCEDURE — 93355 ECHO TRANSESOPHAGEAL (TEE): CPT

## 2019-03-14 PROCEDURE — 85610 PROTHROMBIN TIME: CPT | Performed by: THORACIC SURGERY (CARDIOTHORACIC VASCULAR SURGERY)

## 2019-03-14 PROCEDURE — 25010000002 MORPHINE PER 10 MG: Performed by: THORACIC SURGERY (CARDIOTHORACIC VASCULAR SURGERY)

## 2019-03-14 PROCEDURE — 71045 X-RAY EXAM CHEST 1 VIEW: CPT

## 2019-03-14 PROCEDURE — 94799 UNLISTED PULMONARY SVC/PX: CPT

## 2019-03-14 PROCEDURE — 82962 GLUCOSE BLOOD TEST: CPT

## 2019-03-14 PROCEDURE — 25010000002 PAPAVERINE PER 60 MG: Performed by: THORACIC SURGERY (CARDIOTHORACIC VASCULAR SURGERY)

## 2019-03-14 PROCEDURE — P9016 RBC LEUKOCYTES REDUCED: HCPCS

## 2019-03-14 PROCEDURE — 25010000002 HEPARIN (PORCINE) PER 1000 UNITS: Performed by: NURSE ANESTHETIST, CERTIFIED REGISTERED

## 2019-03-14 PROCEDURE — 93312 ECHO TRANSESOPHAGEAL: CPT | Performed by: INTERNAL MEDICINE

## 2019-03-14 PROCEDURE — 021009W BYPASS CORONARY ARTERY, ONE ARTERY FROM AORTA WITH AUTOLOGOUS VENOUS TISSUE, OPEN APPROACH: ICD-10-PCS | Performed by: THORACIC SURGERY (CARDIOTHORACIC VASCULAR SURGERY)

## 2019-03-14 PROCEDURE — 94002 VENT MGMT INPAT INIT DAY: CPT

## 2019-03-14 PROCEDURE — C1751 CATH, INF, PER/CENT/MIDLINE: HCPCS | Performed by: NURSE ANESTHETIST, CERTIFIED REGISTERED

## 2019-03-14 PROCEDURE — 02100Z9 BYPASS CORONARY ARTERY, ONE ARTERY FROM LEFT INTERNAL MAMMARY, OPEN APPROACH: ICD-10-PCS | Performed by: THORACIC SURGERY (CARDIOTHORACIC VASCULAR SURGERY)

## 2019-03-14 PROCEDURE — 85025 COMPLETE CBC W/AUTO DIFF WBC: CPT | Performed by: INTERNAL MEDICINE

## 2019-03-14 PROCEDURE — C1751 CATH, INF, PER/CENT/MIDLINE: HCPCS | Performed by: THORACIC SURGERY (CARDIOTHORACIC VASCULAR SURGERY)

## 2019-03-14 PROCEDURE — 82805 BLOOD GASES W/O2 SATURATION: CPT

## 2019-03-14 PROCEDURE — 25010000003 CEFAZOLIN PER 500 MG: Performed by: NURSE ANESTHETIST, CERTIFIED REGISTERED

## 2019-03-14 PROCEDURE — A4648 IMPLANTABLE TISSUE MARKER: HCPCS | Performed by: THORACIC SURGERY (CARDIOTHORACIC VASCULAR SURGERY)

## 2019-03-14 PROCEDURE — 25010000002 EPINEPHRINE PER 0.1 MG: Performed by: THORACIC SURGERY (CARDIOTHORACIC VASCULAR SURGERY)

## 2019-03-14 PROCEDURE — 82330 ASSAY OF CALCIUM: CPT

## 2019-03-14 PROCEDURE — B245ZZ4 ULTRASONOGRAPHY OF LEFT HEART, TRANSESOPHAGEAL: ICD-10-PCS | Performed by: THORACIC SURGERY (CARDIOTHORACIC VASCULAR SURGERY)

## 2019-03-14 PROCEDURE — 25810000003 DEXTROSE 5 % WITH KCL 20 MEQ 20-5 MEQ/L-% SOLUTION: Performed by: THORACIC SURGERY (CARDIOTHORACIC VASCULAR SURGERY)

## 2019-03-14 PROCEDURE — 93325 DOPPLER ECHO COLOR FLOW MAPG: CPT | Performed by: INTERNAL MEDICINE

## 2019-03-14 PROCEDURE — 25010000002 CEFAZOLIN PER 500 MG: Performed by: THORACIC SURGERY (CARDIOTHORACIC VASCULAR SURGERY)

## 2019-03-14 PROCEDURE — 36430 TRANSFUSION BLD/BLD COMPNT: CPT

## 2019-03-14 PROCEDURE — 33533 CABG ARTERIAL SINGLE: CPT | Performed by: THORACIC SURGERY (CARDIOTHORACIC VASCULAR SURGERY)

## 2019-03-14 PROCEDURE — 85025 COMPLETE CBC W/AUTO DIFF WBC: CPT | Performed by: THORACIC SURGERY (CARDIOTHORACIC VASCULAR SURGERY)

## 2019-03-14 PROCEDURE — 33517 CABG ARTERY-VEIN SINGLE: CPT | Performed by: THORACIC SURGERY (CARDIOTHORACIC VASCULAR SURGERY)

## 2019-03-14 PROCEDURE — 25010000002 PHENYLEPHRINE 10 MG/ML SOLUTION 5 ML VIAL: Performed by: THORACIC SURGERY (CARDIOTHORACIC VASCULAR SURGERY)

## 2019-03-14 PROCEDURE — 25010000002 MIDAZOLAM PER 1 MG: Performed by: NURSE ANESTHETIST, CERTIFIED REGISTERED

## 2019-03-14 PROCEDURE — 86900 BLOOD TYPING SEROLOGIC ABO: CPT

## 2019-03-14 PROCEDURE — 85730 THROMBOPLASTIN TIME PARTIAL: CPT | Performed by: THORACIC SURGERY (CARDIOTHORACIC VASCULAR SURGERY)

## 2019-03-14 PROCEDURE — 25010000002 VANCOMYCIN 1 G RECONSTITUTED SOLUTION: Performed by: NURSE ANESTHETIST, CERTIFIED REGISTERED

## 2019-03-14 PROCEDURE — 82803 BLOOD GASES ANY COMBINATION: CPT

## 2019-03-14 PROCEDURE — 85007 BL SMEAR W/DIFF WBC COUNT: CPT | Performed by: INTERNAL MEDICINE

## 2019-03-14 PROCEDURE — 83050 HGB METHEMOGLOBIN QUAN: CPT

## 2019-03-14 PROCEDURE — 25010000002 SUCCINYLCHOLINE PER 20 MG: Performed by: NURSE ANESTHETIST, CERTIFIED REGISTERED

## 2019-03-14 PROCEDURE — 25010000002 VANCOMYCIN 10 G RECONSTITUTED SOLUTION: Performed by: THORACIC SURGERY (CARDIOTHORACIC VASCULAR SURGERY)

## 2019-03-14 PROCEDURE — 25010000003 POTASSIUM CHLORIDE PER 2 MEQ: Performed by: THORACIC SURGERY (CARDIOTHORACIC VASCULAR SURGERY)

## 2019-03-14 PROCEDURE — C1769 GUIDE WIRE: HCPCS | Performed by: THORACIC SURGERY (CARDIOTHORACIC VASCULAR SURGERY)

## 2019-03-14 PROCEDURE — 93010 ELECTROCARDIOGRAM REPORT: CPT | Performed by: INTERNAL MEDICINE

## 2019-03-14 PROCEDURE — 93005 ELECTROCARDIOGRAM TRACING: CPT | Performed by: THORACIC SURGERY (CARDIOTHORACIC VASCULAR SURGERY)

## 2019-03-14 PROCEDURE — 5A1221Z PERFORMANCE OF CARDIAC OUTPUT, CONTINUOUS: ICD-10-PCS | Performed by: THORACIC SURGERY (CARDIOTHORACIC VASCULAR SURGERY)

## 2019-03-14 PROCEDURE — 80053 COMPREHEN METABOLIC PANEL: CPT | Performed by: INTERNAL MEDICINE

## 2019-03-14 PROCEDURE — 25010000002 HEPARIN (PORCINE) PER 1000 UNITS: Performed by: THORACIC SURGERY (CARDIOTHORACIC VASCULAR SURGERY)

## 2019-03-14 PROCEDURE — 99233 SBSQ HOSP IP/OBS HIGH 50: CPT | Performed by: INTERNAL MEDICINE

## 2019-03-14 PROCEDURE — 25010000002 PROTAMINE SULFATE PER 10 MG: Performed by: NURSE ANESTHETIST, CERTIFIED REGISTERED

## 2019-03-14 PROCEDURE — 25010000003 EPINEPHRINE 30 MG/30ML SOLUTION 30 ML VIAL: Performed by: NURSE ANESTHETIST, CERTIFIED REGISTERED

## 2019-03-14 PROCEDURE — 85610 PROTHROMBIN TIME: CPT | Performed by: INTERNAL MEDICINE

## 2019-03-14 PROCEDURE — 82820 HEMOGLOBIN-OXYGEN AFFINITY: CPT

## 2019-03-14 PROCEDURE — 85730 THROMBOPLASTIN TIME PARTIAL: CPT | Performed by: INTERNAL MEDICINE

## 2019-03-14 DEVICE — CLIP LIGAT VASC HORIZON TI LG ORNG 6CT: Type: IMPLANTABLE DEVICE | Status: FUNCTIONAL

## 2019-03-14 DEVICE — DISK-SHAPED STYLE, SILICONE (1 PER STERILE PKG)
Type: IMPLANTABLE DEVICE | Site: HEART | Status: FUNCTIONAL
Brand: SCANLAN® RADIOMARK® GRAFT MARKERS

## 2019-03-14 DEVICE — HEMO ABS GELFOAM PWDR PORCN 1GM: Type: IMPLANTABLE DEVICE | Status: FUNCTIONAL

## 2019-03-14 RX ORDER — MEPERIDINE HYDROCHLORIDE 25 MG/ML
25 INJECTION INTRAMUSCULAR; INTRAVENOUS; SUBCUTANEOUS EVERY 4 HOURS PRN
Status: DISCONTINUED | OUTPATIENT
Start: 2019-03-14 | End: 2019-03-14

## 2019-03-14 RX ORDER — LIDOCAINE HYDROCHLORIDE 20 MG/ML
INJECTION, SOLUTION INFILTRATION; PERINEURAL AS NEEDED
Status: DISCONTINUED | OUTPATIENT
Start: 2019-03-14 | End: 2019-03-14 | Stop reason: SURG

## 2019-03-14 RX ORDER — MORPHINE SULFATE 2 MG/ML
2 INJECTION, SOLUTION INTRAMUSCULAR; INTRAVENOUS
Status: DISCONTINUED | OUTPATIENT
Start: 2019-03-14 | End: 2019-03-14 | Stop reason: HOSPADM

## 2019-03-14 RX ORDER — POTASSIUM CHLORIDE 29.8 MG/ML
20 INJECTION INTRAVENOUS
Status: DISCONTINUED | OUTPATIENT
Start: 2019-03-14 | End: 2019-03-19

## 2019-03-14 RX ORDER — SUCCINYLCHOLINE CHLORIDE 20 MG/ML
INJECTION INTRAMUSCULAR; INTRAVENOUS AS NEEDED
Status: DISCONTINUED | OUTPATIENT
Start: 2019-03-14 | End: 2019-03-14 | Stop reason: SURG

## 2019-03-14 RX ORDER — OXYCODONE AND ACETAMINOPHEN 10; 325 MG/1; MG/1
1 TABLET ORAL
Status: DISCONTINUED | OUTPATIENT
Start: 2019-03-14 | End: 2019-03-14 | Stop reason: SDUPTHER

## 2019-03-14 RX ORDER — POTASSIUM CHLORIDE 29.8 MG/ML
20 INJECTION INTRAVENOUS
Status: DISCONTINUED | OUTPATIENT
Start: 2019-03-14 | End: 2019-03-14 | Stop reason: HOSPADM

## 2019-03-14 RX ORDER — ASPIRIN 81 MG/1
81 TABLET ORAL DAILY
Status: DISCONTINUED | OUTPATIENT
Start: 2019-03-16 | End: 2019-03-22 | Stop reason: HOSPADM

## 2019-03-14 RX ORDER — CEFAZOLIN SODIUM 2 G/50ML
2 SOLUTION INTRAVENOUS EVERY 8 HOURS
Status: DISCONTINUED | OUTPATIENT
Start: 2019-03-14 | End: 2019-03-14

## 2019-03-14 RX ORDER — DEXTROSE MONOHYDRATE 25 G/50ML
25-50 INJECTION, SOLUTION INTRAVENOUS
Status: DISCONTINUED | OUTPATIENT
Start: 2019-03-14 | End: 2019-03-22 | Stop reason: HOSPADM

## 2019-03-14 RX ORDER — OXYCODONE HYDROCHLORIDE AND ACETAMINOPHEN 5; 325 MG/1; MG/1
1 TABLET ORAL
Status: DISCONTINUED | OUTPATIENT
Start: 2019-03-14 | End: 2019-03-14 | Stop reason: SDUPTHER

## 2019-03-14 RX ORDER — POTASSIUM CHLORIDE 29.8 MG/ML
20 INJECTION INTRAVENOUS
Status: DISCONTINUED | OUTPATIENT
Start: 2019-03-14 | End: 2019-03-14 | Stop reason: SDUPTHER

## 2019-03-14 RX ORDER — ACETAMINOPHEN 650 MG/1
650 SUPPOSITORY RECTAL EVERY 4 HOURS PRN
Status: DISCONTINUED | OUTPATIENT
Start: 2019-03-14 | End: 2019-03-14 | Stop reason: HOSPADM

## 2019-03-14 RX ORDER — MIDAZOLAM HYDROCHLORIDE 1 MG/ML
INJECTION INTRAMUSCULAR; INTRAVENOUS AS NEEDED
Status: DISCONTINUED | OUTPATIENT
Start: 2019-03-14 | End: 2019-03-14 | Stop reason: SURG

## 2019-03-14 RX ORDER — HEPARIN SODIUM 1000 [USP'U]/ML
INJECTION, SOLUTION INTRAVENOUS; SUBCUTANEOUS AS NEEDED
Status: DISCONTINUED | OUTPATIENT
Start: 2019-03-14 | End: 2019-03-14 | Stop reason: SURG

## 2019-03-14 RX ORDER — BUPIVACAINE HCL/0.9 % NACL/PF 0.1 %
2 PLASTIC BAG, INJECTION (ML) EPIDURAL ONCE
Status: DISCONTINUED | OUTPATIENT
Start: 2019-03-14 | End: 2019-03-14 | Stop reason: HOSPADM

## 2019-03-14 RX ORDER — MAGNESIUM HYDROXIDE 1200 MG/15ML
LIQUID ORAL AS NEEDED
Status: DISCONTINUED | OUTPATIENT
Start: 2019-03-14 | End: 2019-03-14 | Stop reason: HOSPADM

## 2019-03-14 RX ORDER — ONDANSETRON 2 MG/ML
4 INJECTION INTRAMUSCULAR; INTRAVENOUS EVERY 6 HOURS PRN
Status: DISCONTINUED | OUTPATIENT
Start: 2019-03-14 | End: 2019-03-14 | Stop reason: HOSPADM

## 2019-03-14 RX ORDER — BUPIVACAINE HCL/0.9 % NACL/PF 0.1 %
2 PLASTIC BAG, INJECTION (ML) EPIDURAL EVERY 8 HOURS
Status: COMPLETED | OUTPATIENT
Start: 2019-03-15 | End: 2019-03-16

## 2019-03-14 RX ORDER — MEPERIDINE HYDROCHLORIDE 25 MG/ML
25 INJECTION INTRAMUSCULAR; INTRAVENOUS; SUBCUTANEOUS
Status: DISCONTINUED | OUTPATIENT
Start: 2019-03-14 | End: 2019-03-14 | Stop reason: HOSPADM

## 2019-03-14 RX ORDER — OXYCODONE HYDROCHLORIDE AND ACETAMINOPHEN 5; 325 MG/1; MG/1
1 TABLET ORAL
Status: DISCONTINUED | OUTPATIENT
Start: 2019-03-14 | End: 2019-03-18

## 2019-03-14 RX ORDER — POTASSIUM CHLORIDE, DEXTROSE MONOHYDRATE 150; 5 MG/100ML; G/100ML
30 INJECTION, SOLUTION INTRAVENOUS CONTINUOUS
Status: DISCONTINUED | OUTPATIENT
Start: 2019-03-14 | End: 2019-03-14 | Stop reason: HOSPADM

## 2019-03-14 RX ORDER — POTASSIUM CHLORIDE, DEXTROSE MONOHYDRATE 150; 5 MG/100ML; G/100ML
30 INJECTION, SOLUTION INTRAVENOUS CONTINUOUS
Status: DISCONTINUED | OUTPATIENT
Start: 2019-03-15 | End: 2019-03-14 | Stop reason: SDUPTHER

## 2019-03-14 RX ORDER — ATORVASTATIN CALCIUM 10 MG/1
20 TABLET, FILM COATED ORAL NIGHTLY
Status: CANCELLED | OUTPATIENT
Start: 2019-03-15

## 2019-03-14 RX ORDER — CLOPIDOGREL BISULFATE 75 MG/1
75 TABLET ORAL DAILY
Status: CANCELLED | OUTPATIENT
Start: 2019-03-15

## 2019-03-14 RX ORDER — ATORVASTATIN CALCIUM 10 MG/1
20 TABLET, FILM COATED ORAL NIGHTLY
Status: DISCONTINUED | OUTPATIENT
Start: 2019-03-15 | End: 2019-03-22 | Stop reason: HOSPADM

## 2019-03-14 RX ORDER — IPRATROPIUM BROMIDE AND ALBUTEROL SULFATE 2.5; .5 MG/3ML; MG/3ML
3 SOLUTION RESPIRATORY (INHALATION)
Status: DISCONTINUED | OUTPATIENT
Start: 2019-03-15 | End: 2019-03-14 | Stop reason: SDUPTHER

## 2019-03-14 RX ORDER — BUPIVACAINE HCL/0.9 % NACL/PF 0.1 %
2 PLASTIC BAG, INJECTION (ML) EPIDURAL EVERY 8 HOURS
Status: DISCONTINUED | OUTPATIENT
Start: 2019-03-14 | End: 2019-03-14 | Stop reason: HOSPADM

## 2019-03-14 RX ORDER — NITROGLYCERIN 20 MG/100ML
5 INJECTION INTRAVENOUS CONTINUOUS
Status: DISCONTINUED | OUTPATIENT
Start: 2019-03-15 | End: 2019-03-16

## 2019-03-14 RX ORDER — POTASSIUM CHLORIDE 7.45 MG/ML
10 INJECTION INTRAVENOUS
Status: DISCONTINUED | OUTPATIENT
Start: 2019-03-14 | End: 2019-03-14 | Stop reason: HOSPADM

## 2019-03-14 RX ORDER — SODIUM CHLORIDE, SODIUM LACTATE, POTASSIUM CHLORIDE, CALCIUM CHLORIDE 600; 310; 30; 20 MG/100ML; MG/100ML; MG/100ML; MG/100ML
INJECTION, SOLUTION INTRAVENOUS CONTINUOUS PRN
Status: DISCONTINUED | OUTPATIENT
Start: 2019-03-14 | End: 2019-03-14 | Stop reason: SURG

## 2019-03-14 RX ORDER — VANCOMYCIN HYDROCHLORIDE 1 G/20ML
INJECTION, POWDER, LYOPHILIZED, FOR SOLUTION INTRAVENOUS AS NEEDED
Status: DISCONTINUED | OUTPATIENT
Start: 2019-03-14 | End: 2019-03-14 | Stop reason: SURG

## 2019-03-14 RX ORDER — VECURONIUM BROMIDE 1 MG/ML
INJECTION, POWDER, LYOPHILIZED, FOR SOLUTION INTRAVENOUS AS NEEDED
Status: DISCONTINUED | OUTPATIENT
Start: 2019-03-14 | End: 2019-03-14 | Stop reason: SURG

## 2019-03-14 RX ORDER — ASPIRIN 81 MG/1
81 TABLET ORAL DAILY
Status: CANCELLED | OUTPATIENT
Start: 2019-03-16

## 2019-03-14 RX ORDER — SODIUM CHLORIDE 0.9 % (FLUSH) 0.9 %
3-10 SYRINGE (ML) INJECTION AS NEEDED
Status: DISCONTINUED | OUTPATIENT
Start: 2019-03-14 | End: 2019-03-14 | Stop reason: HOSPADM

## 2019-03-14 RX ORDER — CEFAZOLIN SODIUM 2 G/50ML
2 SOLUTION INTRAVENOUS EVERY 8 HOURS
Status: CANCELLED | OUTPATIENT
Start: 2019-03-14 | End: 2019-03-16

## 2019-03-14 RX ORDER — OXYCODONE AND ACETAMINOPHEN 10; 325 MG/1; MG/1
1 TABLET ORAL
Status: DISCONTINUED | OUTPATIENT
Start: 2019-03-14 | End: 2019-03-18

## 2019-03-14 RX ORDER — PHENYLEPHRINE HCL IN 0.9% NACL 0.8MG/10ML
SYRINGE (ML) INTRAVENOUS AS NEEDED
Status: DISCONTINUED | OUTPATIENT
Start: 2019-03-14 | End: 2019-03-14 | Stop reason: SURG

## 2019-03-14 RX ORDER — PROTAMINE SULFATE 10 MG/ML
INJECTION, SOLUTION INTRAVENOUS AS NEEDED
Status: DISCONTINUED | OUTPATIENT
Start: 2019-03-14 | End: 2019-03-14 | Stop reason: SURG

## 2019-03-14 RX ORDER — MORPHINE SULFATE 10 MG/ML
10 INJECTION INTRAMUSCULAR; INTRAVENOUS; SUBCUTANEOUS ONCE
Status: COMPLETED | OUTPATIENT
Start: 2019-03-14 | End: 2019-03-14

## 2019-03-14 RX ORDER — CHLORHEXIDINE GLUCONATE 0.12 MG/ML
15 RINSE ORAL EVERY 12 HOURS
Status: CANCELLED | OUTPATIENT
Start: 2019-03-14

## 2019-03-14 RX ORDER — BISACODYL 5 MG/1
10 TABLET, DELAYED RELEASE ORAL 2 TIMES DAILY
Status: DISCONTINUED | OUTPATIENT
Start: 2019-03-15 | End: 2019-03-22 | Stop reason: HOSPADM

## 2019-03-14 RX ORDER — POTASSIUM CHLORIDE 29.8 MG/ML
20 INJECTION INTRAVENOUS
Status: COMPLETED | OUTPATIENT
Start: 2019-03-14 | End: 2019-03-14

## 2019-03-14 RX ORDER — MORPHINE SULFATE 2 MG/ML
2 INJECTION, SOLUTION INTRAMUSCULAR; INTRAVENOUS
Status: DISCONTINUED | OUTPATIENT
Start: 2019-03-14 | End: 2019-03-14 | Stop reason: SDUPTHER

## 2019-03-14 RX ORDER — CLOPIDOGREL BISULFATE 75 MG/1
75 TABLET ORAL DAILY
Status: DISCONTINUED | OUTPATIENT
Start: 2019-03-15 | End: 2019-03-22 | Stop reason: HOSPADM

## 2019-03-14 RX ORDER — POTASSIUM CHLORIDE, DEXTROSE MONOHYDRATE 150; 5 MG/100ML; G/100ML
30 INJECTION, SOLUTION INTRAVENOUS CONTINUOUS
Status: DISCONTINUED | OUTPATIENT
Start: 2019-03-14 | End: 2019-03-16

## 2019-03-14 RX ORDER — ONDANSETRON 2 MG/ML
4 INJECTION INTRAMUSCULAR; INTRAVENOUS EVERY 6 HOURS PRN
Status: DISCONTINUED | OUTPATIENT
Start: 2019-03-14 | End: 2019-03-22 | Stop reason: HOSPADM

## 2019-03-14 RX ORDER — SUFENTANIL CITRATE 50 UG/ML
INJECTION EPIDURAL; INTRAVENOUS AS NEEDED
Status: DISCONTINUED | OUTPATIENT
Start: 2019-03-14 | End: 2019-03-14 | Stop reason: SURG

## 2019-03-14 RX ORDER — MEPERIDINE HYDROCHLORIDE 25 MG/ML
25 INJECTION INTRAMUSCULAR; INTRAVENOUS; SUBCUTANEOUS
Status: ACTIVE | OUTPATIENT
Start: 2019-03-14 | End: 2019-03-15

## 2019-03-14 RX ORDER — MORPHINE SULFATE 10 MG/ML
INJECTION, SOLUTION INTRAMUSCULAR; INTRAVENOUS
Status: COMPLETED
Start: 2019-03-14 | End: 2019-03-14

## 2019-03-14 RX ORDER — ETOMIDATE 2 MG/ML
INJECTION INTRAVENOUS AS NEEDED
Status: DISCONTINUED | OUTPATIENT
Start: 2019-03-14 | End: 2019-03-14 | Stop reason: SURG

## 2019-03-14 RX ORDER — MORPHINE SULFATE 2 MG/ML
2 INJECTION, SOLUTION INTRAMUSCULAR; INTRAVENOUS
Status: DISCONTINUED | OUTPATIENT
Start: 2019-03-14 | End: 2019-03-16

## 2019-03-14 RX ORDER — MEPERIDINE HYDROCHLORIDE 25 MG/ML
INJECTION INTRAMUSCULAR; INTRAVENOUS; SUBCUTANEOUS
Status: COMPLETED
Start: 2019-03-14 | End: 2019-03-14

## 2019-03-14 RX ORDER — DEXTROSE MONOHYDRATE 25 G/50ML
25-50 INJECTION, SOLUTION INTRAVENOUS
Status: DISCONTINUED | OUTPATIENT
Start: 2019-03-14 | End: 2019-03-14 | Stop reason: SDUPTHER

## 2019-03-14 RX ORDER — NITROGLYCERIN 20 MG/100ML
5 INJECTION INTRAVENOUS CONTINUOUS
Status: CANCELLED | OUTPATIENT
Start: 2019-03-14

## 2019-03-14 RX ORDER — IPRATROPIUM BROMIDE AND ALBUTEROL SULFATE 2.5; .5 MG/3ML; MG/3ML
3 SOLUTION RESPIRATORY (INHALATION)
Status: DISCONTINUED | OUTPATIENT
Start: 2019-03-15 | End: 2019-03-20

## 2019-03-14 RX ORDER — CEFAZOLIN SODIUM 1 G/3ML
INJECTION, POWDER, FOR SOLUTION INTRAMUSCULAR; INTRAVENOUS AS NEEDED
Status: DISCONTINUED | OUTPATIENT
Start: 2019-03-14 | End: 2019-03-14 | Stop reason: SURG

## 2019-03-14 RX ORDER — SODIUM CHLORIDE 0.9 % (FLUSH) 0.9 %
3 SYRINGE (ML) INJECTION EVERY 12 HOURS SCHEDULED
Status: DISCONTINUED | OUTPATIENT
Start: 2019-03-14 | End: 2019-03-14 | Stop reason: HOSPADM

## 2019-03-14 RX ORDER — DEXTROSE MONOHYDRATE 25 G/50ML
25-50 INJECTION, SOLUTION INTRAVENOUS
Status: DISCONTINUED | OUTPATIENT
Start: 2019-03-14 | End: 2019-03-14 | Stop reason: HOSPADM

## 2019-03-14 RX ORDER — ONDANSETRON 2 MG/ML
4 INJECTION INTRAMUSCULAR; INTRAVENOUS EVERY 6 HOURS PRN
Status: DISCONTINUED | OUTPATIENT
Start: 2019-03-14 | End: 2019-03-14 | Stop reason: SDUPTHER

## 2019-03-14 RX ORDER — CHLORHEXIDINE GLUCONATE 0.12 MG/ML
15 RINSE ORAL EVERY 12 HOURS SCHEDULED
Status: DISCONTINUED | OUTPATIENT
Start: 2019-03-14 | End: 2019-03-18

## 2019-03-14 RX ORDER — NITROGLYCERIN 20 MG/100ML
5 INJECTION INTRAVENOUS
Status: DISCONTINUED | OUTPATIENT
Start: 2019-03-14 | End: 2019-03-14 | Stop reason: HOSPADM

## 2019-03-14 RX ORDER — METOPROLOL TARTRATE 5 MG/5ML
INJECTION INTRAVENOUS AS NEEDED
Status: DISCONTINUED | OUTPATIENT
Start: 2019-03-14 | End: 2019-03-14 | Stop reason: SURG

## 2019-03-14 RX ORDER — AMINOCAPROIC ACID 250 MG/ML
INJECTION, SOLUTION INTRAVENOUS AS NEEDED
Status: DISCONTINUED | OUTPATIENT
Start: 2019-03-14 | End: 2019-03-14 | Stop reason: SURG

## 2019-03-14 RX ORDER — MEPERIDINE HYDROCHLORIDE 25 MG/ML
25 INJECTION INTRAMUSCULAR; INTRAVENOUS; SUBCUTANEOUS
Status: DISCONTINUED | OUTPATIENT
Start: 2019-03-14 | End: 2019-03-14 | Stop reason: SDUPTHER

## 2019-03-14 RX ORDER — AMIODARONE HYDROCHLORIDE 200 MG/1
400 TABLET ORAL EVERY 6 HOURS
Status: DISCONTINUED | OUTPATIENT
Start: 2019-03-15 | End: 2019-03-16

## 2019-03-14 RX ORDER — SODIUM CHLORIDE 9 MG/ML
INJECTION, SOLUTION INTRAVENOUS AS NEEDED
Status: DISCONTINUED | OUTPATIENT
Start: 2019-03-14 | End: 2019-03-14 | Stop reason: HOSPADM

## 2019-03-14 RX ORDER — ASPIRIN 325 MG
162 TABLET ORAL ONCE
Status: CANCELLED | OUTPATIENT
Start: 2019-03-15 | End: 2019-03-15

## 2019-03-14 RX ADMIN — ACETAMINOPHEN 650 MG: 650 SUPPOSITORY RECTAL at 15:58

## 2019-03-14 RX ADMIN — SUFENTANIL CITRATE 50 MCG: 50 INJECTION, SOLUTION EPIDURAL; INTRAVENOUS at 08:15

## 2019-03-14 RX ADMIN — HEPARIN SODIUM 40000 UNITS: 1000 INJECTION, SOLUTION INTRAVENOUS; SUBCUTANEOUS at 08:43

## 2019-03-14 RX ADMIN — ETOMIDATE 16 MG: 2 INJECTION, SOLUTION INTRAVENOUS at 07:29

## 2019-03-14 RX ADMIN — MORPHINE SULFATE 2 MG: 2 INJECTION, SOLUTION INTRAMUSCULAR; INTRAVENOUS at 19:48

## 2019-03-14 RX ADMIN — MORPHINE SULFATE 10 MG: 10 INJECTION INTRAVENOUS at 16:38

## 2019-03-14 RX ADMIN — NITROGLYCERIN 5 MCG/MIN: 20 INJECTION INTRAVENOUS at 15:10

## 2019-03-14 RX ADMIN — MIDAZOLAM HYDROCHLORIDE 2 MG: 1 INJECTION, SOLUTION INTRAMUSCULAR; INTRAVENOUS at 07:07

## 2019-03-14 RX ADMIN — AMINOCAPROIC ACID 1 G/HR: 250 INJECTION, SOLUTION INTRAVENOUS at 08:24

## 2019-03-14 RX ADMIN — METOPROLOL TARTRATE 2.5 MG: 5 INJECTION INTRAVENOUS at 07:09

## 2019-03-14 RX ADMIN — POTASSIUM CHLORIDE AND DEXTROSE MONOHYDRATE 30 ML/HR: 150; 5 INJECTION, SOLUTION INTRAVENOUS at 15:06

## 2019-03-14 RX ADMIN — LIDOCAINE HYDROCHLORIDE 100 MG: 20 INJECTION, SOLUTION INFILTRATION; PERINEURAL at 07:32

## 2019-03-14 RX ADMIN — VANCOMYCIN HYDROCHLORIDE 1.25 G: 1 INJECTION, POWDER, LYOPHILIZED, FOR SOLUTION INTRAVENOUS at 08:05

## 2019-03-14 RX ADMIN — MIDAZOLAM HYDROCHLORIDE 5 MG: 1 INJECTION, SOLUTION INTRAMUSCULAR; INTRAVENOUS at 10:15

## 2019-03-14 RX ADMIN — EPHEDRINE SULFATE 5 MG: 50 INJECTION INTRAMUSCULAR; INTRAVENOUS; SUBCUTANEOUS at 08:10

## 2019-03-14 RX ADMIN — SODIUM CHLORIDE 1000 ML: 9 INJECTION, SOLUTION INTRAVENOUS at 19:25

## 2019-03-14 RX ADMIN — SUFENTANIL CITRATE 50 MCG: 50 INJECTION, SOLUTION EPIDURAL; INTRAVENOUS at 07:29

## 2019-03-14 RX ADMIN — SODIUM CHLORIDE 2 G: 9 INJECTION, SOLUTION INTRAVENOUS at 21:21

## 2019-03-14 RX ADMIN — VECURONIUM BROMIDE 10 MG: 1 INJECTION, POWDER, LYOPHILIZED, FOR SOLUTION INTRAVENOUS at 07:32

## 2019-03-14 RX ADMIN — MORPHINE SULFATE 10 MG: 10 INJECTION INTRAMUSCULAR; INTRAVENOUS; SUBCUTANEOUS at 16:38

## 2019-03-14 RX ADMIN — EPINEPHRINE 0.03 MCG/KG/MIN: 1 INJECTION, SOLUTION, CONCENTRATE INTRAVENOUS at 15:14

## 2019-03-14 RX ADMIN — SUFENTANIL CITRATE 50 MCG: 50 INJECTION, SOLUTION EPIDURAL; INTRAVENOUS at 07:33

## 2019-03-14 RX ADMIN — MIDAZOLAM HYDROCHLORIDE 1 MG: 1 INJECTION, SOLUTION INTRAMUSCULAR; INTRAVENOUS at 07:10

## 2019-03-14 RX ADMIN — POTASSIUM CHLORIDE 20 MEQ: 29.8 INJECTION, SOLUTION INTRAVENOUS at 15:58

## 2019-03-14 RX ADMIN — PROTAMINE SULFATE 250 MG: 10 INJECTION, SOLUTION INTRAVENOUS at 11:06

## 2019-03-14 RX ADMIN — Medication 40 MCG: at 09:30

## 2019-03-14 RX ADMIN — METOPROLOL TARTRATE 2.5 MG: 5 INJECTION INTRAVENOUS at 07:16

## 2019-03-14 RX ADMIN — PHENYLEPHRINE HYDROCHLORIDE 0.5 MCG/KG/MIN: 10 INJECTION INTRAVENOUS at 14:39

## 2019-03-14 RX ADMIN — SUFENTANIL CITRATE 50 MCG: 50 INJECTION, SOLUTION EPIDURAL; INTRAVENOUS at 12:18

## 2019-03-14 RX ADMIN — SUFENTANIL CITRATE 250 MCG: 50 INJECTION, SOLUTION EPIDURAL; INTRAVENOUS at 10:15

## 2019-03-14 RX ADMIN — MEPERIDINE HYDROCHLORIDE 25 MG: 25 INJECTION INTRAMUSCULAR; INTRAVENOUS; SUBCUTANEOUS at 13:15

## 2019-03-14 RX ADMIN — POTASSIUM CHLORIDE AND DEXTROSE MONOHYDRATE 30 ML/HR: 150; 5 INJECTION, SOLUTION INTRAVENOUS at 15:05

## 2019-03-14 RX ADMIN — EPHEDRINE SULFATE 5 MG: 50 INJECTION INTRAMUSCULAR; INTRAVENOUS; SUBCUTANEOUS at 07:53

## 2019-03-14 RX ADMIN — SODIUM CHLORIDE, POTASSIUM CHLORIDE, SODIUM LACTATE AND CALCIUM CHLORIDE: 600; 310; 30; 20 INJECTION, SOLUTION INTRAVENOUS at 08:05

## 2019-03-14 RX ADMIN — VECURONIUM BROMIDE 10 MG: 1 INJECTION, POWDER, LYOPHILIZED, FOR SOLUTION INTRAVENOUS at 09:30

## 2019-03-14 RX ADMIN — AMINOCAPROIC ACID 1 G/HR: 250 INJECTION, SOLUTION INTRAVENOUS at 12:54

## 2019-03-14 RX ADMIN — MEPERIDINE HYDROCHLORIDE 25 MG: 25 INJECTION INTRAMUSCULAR; INTRAVENOUS; SUBCUTANEOUS at 14:15

## 2019-03-14 RX ADMIN — SODIUM CHLORIDE 5 MG/HR: 9 INJECTION, SOLUTION INTRAVENOUS at 13:30

## 2019-03-14 RX ADMIN — CEFAZOLIN 1 G: 1 INJECTION, POWDER, FOR SOLUTION INTRAVENOUS at 11:15

## 2019-03-14 RX ADMIN — EPINEPHRINE 0.03 MCG/KG/MIN: 1 INJECTION PARENTERAL at 10:43

## 2019-03-14 RX ADMIN — MIDAZOLAM HYDROCHLORIDE 2 MG: 1 INJECTION, SOLUTION INTRAMUSCULAR; INTRAVENOUS at 07:17

## 2019-03-14 RX ADMIN — SUFENTANIL CITRATE 50 MCG: 50 INJECTION, SOLUTION EPIDURAL; INTRAVENOUS at 09:15

## 2019-03-14 RX ADMIN — Medication 80 MCG: at 09:24

## 2019-03-14 RX ADMIN — SUFENTANIL CITRATE 50 MCG: 50 INJECTION, SOLUTION EPIDURAL; INTRAVENOUS at 08:27

## 2019-03-14 RX ADMIN — AMINOCAPROIC ACID 5 G: 250 INJECTION, SOLUTION INTRAVENOUS at 08:19

## 2019-03-14 RX ADMIN — SUCCINYLCHOLINE CHLORIDE 160 MG: 20 INJECTION, SOLUTION INTRAMUSCULAR; INTRAVENOUS at 07:32

## 2019-03-14 RX ADMIN — SODIUM CHLORIDE 2 UNITS/HR: 9 INJECTION, SOLUTION INTRAVENOUS at 17:12

## 2019-03-14 RX ADMIN — SODIUM CHLORIDE, POTASSIUM CHLORIDE, SODIUM LACTATE AND CALCIUM CHLORIDE: 600; 310; 30; 20 INJECTION, SOLUTION INTRAVENOUS at 07:55

## 2019-03-14 RX ADMIN — VANCOMYCIN HYDROCHLORIDE 1250 MG: 10 INJECTION, POWDER, LYOPHILIZED, FOR SOLUTION INTRAVENOUS at 18:59

## 2019-03-14 RX ADMIN — CEFAZOLIN 2 G: 1 INJECTION, POWDER, FOR SOLUTION INTRAVENOUS at 08:05

## 2019-03-14 NOTE — PROGRESS NOTES
PULMONARY AND CRITICAL CARE PROGRESS NOTE - [facility]    Patient: Gerson Wilhelm    1945    MR# [id number]    Acct# [id number]  03/14/19   2:50 PM  Referring Provider: Markell Obrien MD    Chief Complaint: Mechanically ventilated    Interval history: The patient is status post his bypass graft surgery.  He is intubated on mechanical ventilatory support.  He has failed weaning trials at present with problems with elevation of his PCO2.    Meds:    [MAR Hold] aspirin 81 mg Oral Daily   [MAR Hold] atorvastatin 40 mg Oral Nightly   carvedilol 3.125 mg Oral BID With Meals   ceFAZolin 2 g Intravenous Q8H   [START ON 3/15/2019] enoxaparin 30 mg Subcutaneous Q12H   [MAR Hold] escitalopram 20 mg Oral Daily   [MAR Hold] ferrous sulfate 325 mg Oral BID   [MAR Hold] furosemide 40 mg Oral Daily   [MAR Hold] glipiZIDE 10 mg Oral Daily   [MAR Hold] insulin detemir 34 Units Subcutaneous Nightly   [MAR Hold] pantoprazole 40 mg Oral Q AM   phenylephrine      ramipril 10 mg Oral Daily   [MAR Hold] rOPINIRole 4 mg Oral Nightly   vancomycin 15 mg/kg Intravenous Q12H       aminocaproic acid 1 g/hr    dextrose 5 % with KCl 20 mEq 30 mL/hr    And     dextrose 5 % with KCl 20 mEq 30 mL/hr    DOPamine 2-20 mcg/kg/min Last Rate: Stopped (03/12/19 0813)   niCARdipine 5-15 mg/hr Last Rate: Stopped (03/14/19 1347)   phenylephrine 0.5-3 mcg/kg/min    sodium chloride 50 mL/hr Last Rate: Stopped (03/14/19 1200)     Review of Systems:   Cannot obtain due to mechanical ventilation.  The patient notably is critically ill and connected to a ventilator.  As such patient cannot communicate and provide any history whatsoever, including any history of present illness or interval history since arrival or review of systems. The interested reviewer may note this fact, as an attempt has been made at collecting and documenting these portions of the patient history, but this information is unobtainable despite attempted review and  therefore cannot be documented at this time.   Ventilator Settings:     Vt (Set, L): 0.55 L  Resp Rate (Set): 20  Pressure Support (cm H2O): 10 cm H20  FiO2 (%): 60 %  PEEP/CPAP (cm H2O): 5 cm H20  Minute Ventilation (L/min) (Obs): 6.85 L/min  Resp Rate (Observed) Vent: 20  I:E Ratio (Set): 1:3.20     PIP Observed (cm H2O): 23 cm H2O     Physical Exam:  Temp:  [97.5 °F (36.4 °C)-98.7 °F (37.1 °C)] 97.5 °F (36.4 °C)  Heart Rate:  [] 85  Resp:  [16-28] 22  BP: (102-165)/(65-91) 122/83  Arterial Line BP: (105-132)/(61-67) 132/65  FiO2 (%):  [60 %] 60 %    Intake/Output Summary (Last 24 hours) at 3/14/2019 1450  Last data filed at 3/14/2019 1254  Gross per 24 hour   Intake 2052.4 ml   Output 1387 ml   Net 665.4 ml     SpO2 Percentage    03/14/19 0535 03/14/19 0605 03/14/19 0630   SpO2: 96% 95% 95%      Physical Exam   Constitutional: He appears well-developed.   He has a lightly obese white male who is intubated on mechanical ventilatory support.   HENT:   Endotracheal tube is in place.   Eyes: Pupils are equal, round, and reactive to light.   Neck: Normal range of motion. Neck supple.   Cardiovascular: Normal rate, regular rhythm and normal heart sounds.   Pulmonary/Chest:   He is being mechanically ventilated.  He has no adventitious sounds on chest exam.  Breath sounds are somewhat diminished at the bases.   Abdominal: Soft.   Musculoskeletal: Normal range of motion.   Lymphadenopathy:   No adenopathy is palpated.   Neurological:   He is intubated.  He has no focal deficits noted.   Skin: Skin is warm and dry.   Psychiatric:   Cannot assess as he is intubated.   Nursing note and vitals reviewed.    Results from last 7 days   Lab Units 03/14/19  1300 03/14/19  0402 03/13/19  1717   WBC 10*3/mm3 5.74 6.25 6.58   HEMOGLOBIN g/dL 10.9* 11.0* 12.0*   PLATELETS 10*3/mm3 113* 107* 162     Results from last 7 days   Lab Units 03/14/19  1300 03/14/19  1120 03/14/19  1030 03/14/19  0959  03/14/19  0326 03/13/19  1717    SODIUM mmol/L 143  --   --   --   --  141 138   SODIUM, VENOUS mmol/L  --   --   --  143  --   --   --    SODIUM, ARTERIAL mmol/L  --  142 142  --    < >  --   --    POTASSIUM mmol/L 3.9  --   --   --   --  4.1 4.2   POTASSIUM, VENOUS mmol/L  --   --   --  4.0  --   --   --    BUN mg/dL 13  --   --   --   --  15 16   CREATININE mg/dL 0.95  --   --   --   --  0.96 1.08    < > = values in this interval not displayed.     Results from last 7 days   Lab Units 03/14/19  1400 03/14/19  1300 03/14/19  1120   PH, ARTERIAL pH units 7.265* 7.378 7.466*   PCO2, ARTERIAL mm Hg 58.4* 49.7* 38.4   PO2 ART mm Hg 82.9* 117.0* 221.0*   FIO2 % 60 60 100        Recent films:  Ct Angiogram Chest With Contrast    Result Date: 3/12/2019  1. Large hiatal hernia with bibasilar atelectasis. Chronic peripheral interstitial lung change. 2. No pulmonary emboli. 3. No thoracic aortic aneurysm or dissection. Mild thoracic aortic calcification with moderate coronary artery calcification. 4. Hyperdense material within the lumen of the gallbladder could be vicarious excretion of contrast. Biliary sludge second possibility. This report was finalized on 03/12/2019 16:16 by Dr. Mini Hyde MD.  EXAMINATION: XR CHEST 1 VW-     3/14/2019 2:35 PM CDT     HISTORY: tube placement; Z74.09-Other reduced mobility; R06.09-Other  forms of dyspnea; I25.10-Atherosclerotic heart disease of native  coronary artery without angina pectoris.     Postoperative chest x-ray compared with 1:11 PM.     Stable heart size with slightly improved lung expansion and partial  clearing of left lower lobe atelectasis.  Right lung base opacity associated with intrathoracic stomach.     Endotracheal tube and [place]-Tony catheter remain in good position.  Mediastinal chest tubes are unchanged.     Summary:  1. Similar appearance of the chest with mild clearing of left basilar  atelectasis.  This report was finalized on 03/14/2019 14:47 by Dr. Cuong Dias MD.  Films reviewed  personally by me.  My interpretation: The endotracheal tube is in good position.  He did have some left lower lobe atelectasis on his earlier film which appears slightly improved.  Pulmonary Assessment:  1. Restrictive lung disease.  2. Status post coronary artery bypass graft surgery, the patient is on the ventilator postoperatively.    Recommend:   · Respiratory will continue to attempt to wean the patient as he tolerates.    Electronically signed by Rashaad Loera MD on 3/14/2019 at 2:50 PM

## 2019-03-14 NOTE — ANESTHESIA PREPROCEDURE EVALUATION
Anesthesia Evaluation     Patient summary reviewed   no history of anesthetic complications:  NPO Solid Status: > 8 hours  NPO Liquid Status: > 8 hours           Airway   Mallampati: I  TM distance: >3 FB  Neck ROM: full  Dental    (+) poor dentition    Comment: All teeth are chipped or missing, extremely poor dentition    Pulmonary - normal exam    breath sounds clear to auscultation  (+) a smoker (Remote) Former,   (-) recent URI, sleep apnea    ROS comment: Restrictive disease related to large hiatal hernia  Cardiovascular   Exercise tolerance: poor (<4 METS)    ECG reviewed  Patient on routine beta blocker and Beta blocker given within 24 hours of surgery  Rhythm: regular  Rate: normal    (+) hypertension, CAD, CHF,   (-) past MI, angina, cardiac stents    ROS comment: TTE 3/12/19 - ·Estimated EF = 33%.  ·The following left ventricular wall segments are dyskinetic: mid anterior, apical anterior, apical lateral, apical inferior, apical septal and apex.  ·Left ventricular diastolic dysfunction.  ·No evidence of pulmonary hypertension is present.    Cath 3/11/19 - Left main coronary artery does not have any high-grade lesions.  Arises normally     Left anterior descending coronary artery in the midportion has approximately a 65-70% stenosis  By fractional flow reserve abnormal with the FFR below 0.8  The first diagonal branch which is a fairly large vessel has a mid 90% eccentric stenosis  Left circumflex coronary artery and obtuse marginal branches do not have any high-grade lesions  Right coronary artery has mild atherosclerotic plaques without high-grade lesions.     Left ventricular ejection fraction is 30% with mild left ventricular enlargement.    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, right sided weakness) residual symptoms,     (-) seizures, TIA  GI/Hepatic/Renal/Endo    (+)  hiatal hernia, GERD,  diabetes mellitus,   (-) liver disease, no renal disease, hypothyroidism, hyperthyroidism    Musculoskeletal      Abdominal    Substance History      OB/GYN          Other                      Anesthesia Plan    ASA 4     general   (Preop arterial line  Post induction central line with [place]-Tony  Post induction BARB placement (has hiatal hernia, will proceed with caution for BARB))  intravenous induction   Anesthetic plan, all risks, benefits, and alternatives have been provided, discussed and informed consent has been obtained with: patient.  Use of blood products discussed with patient  Consented to blood products.

## 2019-03-14 NOTE — PLAN OF CARE
Problem: Patient Care Overview  Goal: Plan of Care Review  Outcome: Ongoing (interventions implemented as appropriate)   03/14/19 0439   Coping/Psychosocial   Plan of Care Reviewed With patient   Plan of Care Review   Progress no change   OTHER   Outcome Summary VSS. confused at times-attempting to get out of bed at times, bed alarm on, wife at bedside. appropriately answers orientation questions. urine output minimally adequate. nacl at 50ml/hr. clipped and CHG cleanse last night, will CHG again this morning. no complaints of pain. room air. AM labs stable. will continue to monitor.     Goal: Individualization and Mutuality  Outcome: Ongoing (interventions implemented as appropriate)    Goal: Discharge Needs Assessment  Outcome: Ongoing (interventions implemented as appropriate)    Goal: Interprofessional Rounds/Family Conf  Outcome: Ongoing (interventions implemented as appropriate)      Problem: Cardiac Catheterization (Diagnostic/Interventional) (Adult)  Goal: Signs and Symptoms of Listed Potential Problems Will be Absent, Minimized or Managed (Cardiac Catheterization)  Outcome: Ongoing (interventions implemented as appropriate)      Problem: Fall Risk (Adult)  Goal: Absence of Fall  Outcome: Ongoing (interventions implemented as appropriate)

## 2019-03-14 NOTE — ANESTHESIA PROCEDURE NOTES
Central Line    Pre-sedation assessment completed: 3/14/2019 7:00 AM    Patient location during procedure: OR  Start time: 3/14/2019 7:38 AM  Stop Time:3/14/2019 7:46 AM  Staff  Anesthesiologist: Eugene Mehta MD  Preanesthetic Checklist  Completed: patient identified, site marked, surgical consent, pre-op evaluation, timeout performed, IV checked, risks and benefits discussed and monitors and equipment checked  Central Line Prep  Sterile Tech:cap, gloves, gown, mask and sterile barriers  Prep: chloraprep  Patient monitoring: continuous pulse oximetry, EKG and blood pressure monitoring  Central Line Procedure  Laterality:right  Location:internal jugular  Catheter Type:triple lumen, [place] and MAC  Guidance:ultrasound guided  PROCEDURE NOTE/ULTRASOUND INTERPRETATION.  Using ultrasound guidance the potential vascular sites for insertion of the catheter were visualized to determine the patency of the vessel to be used for vascular access.  After selecting the appropriate site for insertion, the needle was visualized under ultrasound being inserted into the internal jugular vein, followed by ultrasound confirmation of wire and catheter placement. There were no abnormalities seen on ultrasound; an image was taken; and the patient tolerated the procedure with no complications.   Assessment  Post procedure:biopatch applied, line sutured and secured with tape  Assessement:blood return through all ports and free fluid flow  Complications:no  Patient Tolerance:patient tolerated the procedure well with no apparent complications

## 2019-03-14 NOTE — PROGRESS NOTES
LOS: 1 day   Patient Care Team:  Joshua Griggs MD as PCP - General (Family Medicine)    Chief Complaint:   ENNIS (dyspnea on exertion)    Coronary artery disease involving native coronary artery of native heart without angina pectoris    History of stroke    Type 2 diabetes mellitus with circulatory disorder, with long-term current use of insulin (CMS/[facility])    Essential hypertension    Smokeless tobacco use    Shortness of breath    Subjective    Feeling much better  No chest pain  No excessive shortness of breath  Sitting up on chair  No significant arrhythmia  Hemodynamically stable  Latest test results reviewed  Multiple family members by bedside  Nurse providing excellent care to him is by bedside  No significant pain at the arteriotomy site    Interval History: Improved overall  Telemetry: no malignant arrhythmia. No significant pauses.    Review of Systems     Constitutional: No chills   Has fatigue   No fever.     HENT: Negative.    Eyes: Negative.      Respiratory: Negative for cough,   No chest wall soreness,   Shortness of breath,   no wheezing, no stridor.      Cardiovascular: Negative for chest pain,   No palpitations   No significant  leg swelling.     Gastrointestinal: Negative for abdominal distention,  No abdominal pain,   No blood in stool,   No constipation,   No diarrhea,   No nausea   No vomiting.     Endocrine: Negative.    Genitourinary: Negative for difficulty urinating, dysuria, flank pain and hematuria.     Musculoskeletal: Negative.    Skin: Negative for rash and wound.   Allergic/Immunologic: Negative.      Neurological: Negative for dizziness, syncope, weakness,   No light-headedness  No  headaches.     Hematological: Does not bruise/bleed easily.     Psychiatric/Behavioral: Negative for agitation or behavioral problems,   No confusion,   the patient is  nervous/anxious.       History:   Past Medical History:   Diagnosis Date   • CHF (congestive heart failure) (CMS/[facility])    •  Diabetes mellitus (CMS/[facility])    • GERD (gastroesophageal reflux disease)    • GI bleed    • Hiatal hernia    • Kidney stones    • Stroke (CMS/[facility])     decemeber 2012     Past Surgical History:   Procedure Laterality Date   • CARDIAC CATHETERIZATION Left 3/11/2019    Procedure: Cardiac Catheterization/Vascular Study;  Surgeon: Markell Obrien MD;  Location:  [facility] CATH INVASIVE LOC[facility];  Service: Cardiology   • CARDIAC CATHETERIZATION  3/11/2019    Procedure: Functional Flow [place];  Surgeon: Markell Obrien MD;  Location:  [facility] CATH INV[facility] LOC[facility];  Service: Cardiology   • CARDIAC CATHETERIZATION N/A 3/11/2019    Procedure: Left ventriculography;  Surgeon: Markell Obrien MD;  Location:  [facility] CATH INVASIVE LOC[facility];  Service: Cardiology     Social History     Socioeconomic History   • Marital status:      Spouse name: Not on file   • Number of children: Not on file   • Years of education: Not on file   • Highest education level: Not on file   Social Needs   • Financial resource strain: Not on file   • Food insecurity - worry: Not on file   • Food insecurity - inability: Not on file   • Transportation needs - medical: Not on file   • Transportation needs - non-medical: Not on file   Occupational History   • Not on file   Tobacco Use   • Smoking status: Former Smoker   • Smokeless tobacco: Current User   Substance and Sexual Activity   • Alcohol use: No     Frequency: Never   • Drug use: No   • Sexual activity: Not on file   Other Topics Concern   • Not on file   Social History Narrative   • Not on file     History reviewed. No pertinent family history.    Labs:  WBC WBC   Date Value Ref Range Status   03/13/2019 6.58 4.80 - 10.80 10*3/mm3 Final   03/12/2019 10.41 4.80 - 10.80 10*3/mm3 Final   03/11/2019 10.57 4.80 - 10.80 10*3/mm3 Final   03/11/2019 8.25 4.80 - 10.80 10*3/mm3 Final      HGB Hemoglobin   Date Value Ref Range Status   03/13/2019 12.0 (L) 14.0 - 18.0 g/dL Final   03/12/2019 13.0 (L) 14.0 - 18.0  g/dL Final   03/11/2019 11.0 (L) 14.0 - 18.0 g/dL Final   03/11/2019 13.9 (L) 14.0 - 18.0 g/dL Final      HCT Hematocrit   Date Value Ref Range Status   03/13/2019 35.9 (L) 40.0 - 52.0 % Final   03/12/2019 39.5 (L) 40.0 - 52.0 % Final   03/11/2019 32.3 (L) 40.0 - 52.0 % Final   03/11/2019 41.4 40.0 - 52.0 % Final      Platelets Platelets   Date Value Ref Range Status   03/13/2019 162 130 - 400 10*3/mm3 Final   03/12/2019 226 130 - 400 10*3/mm3 Final   03/11/2019 181 130 - 400 10*3/mm3 Final   03/11/2019 207 130 - 400 10*3/mm3 Final      MCV MCV   Date Value Ref Range Status   03/13/2019 90.2 82.0 - 95.0 fL Final   03/12/2019 88.6 82.0 - 95.0 fL Final   03/11/2019 89.7 82.0 - 95.0 fL Final   03/11/2019 87.9 82.0 - 95.0 fL Final        Results from last 7 days   Lab Units 03/13/19  1717 03/12/19  0321 03/11/19  1235   SODIUM mmol/L 138 140 140   POTASSIUM mmol/L 4.2 5.0 4.5   CHLORIDE mmol/L 102 103 101   CO2 mmol/L 27.0 28.0 29.0   BUN mg/dL 16 18 18   CREATININE mg/dL 1.08 1.30 1.00   CALCIUM mg/dL 8.5 9.0 9.1   BILIRUBIN mg/dL 0.5  --   --    ALK PHOS U/L 82  --   --    ALT (SGPT) U/L <15  --   --    AST (SGOT) U/L 16  --   --    GLUCOSE mg/dL 270* 269* 256*     Lab Results   Component Value Date    TROPONINI 0.040 (H) 03/11/2019     PT/INR:    Protime   Date Value Ref Range Status   03/13/2019 15.2 (H) 11.9 - 14.6 Seconds Final   /  INR   Date Value Ref Range Status   03/13/2019 1.16 (H) 0.91 - 1.09 Final       Imaging Results (last 72 hours)     Procedure Component Value Units Date/Time    XR Chest 1 View [[id number]] Collected:  03/12/19 0748     Updated:  03/12/19 0753    Narrative:       EXAMINATION: XR CHEST 1 VW-     3/11/2019 11:40 PM CDT     HISTORY: rrt; R06.09-Other forms of dyspnea.     One view chest x-ray compared with 11/2/2018.     Magnified heart size. Aortic arch calcification.     Chronic opacity at the right lung base was seen to represent  intrathoracic stomach on a CT exam from 4 months ago.      The upper lobes are essentially clear.     There is no pneumothorax and no overt heart failure.     Summary:  1. Intrathoracic stomach with obscuration of the right lung base. No  definite pneumonia, pneumothorax, or heart failure.  This report was finalized on 03/12/2019 07:50 by Dr. Cuong Dias MD.    XR Abdomen KUB [[id number]] Collected:  03/12/19 0716     Updated:  03/12/19 0722    Narrative:       EXAMINATION: XR ABDOMEN KUB- 3/12/2019 7:16 AM CDT     HISTORY: nausea/ vomiting; post cath; R06.09-Other forms of dyspnea.     REPORT: A supine view of the abdomen is compared with CT of the abdomen  and pelvis 11/2/2018.     Moderate stool volume is present. There is mild dilation of the long  loop of the colon centrally, the maximum diameter of approximately 8.8  cm. No free air is identified on the supine view. Contrast is noted  within the urinary tract bladder. There is mild levoscoliosis of the  lumbar spine. No acute osseous findings.       Impression:       Nonspecific bowel gas pattern with mild dilation of a single  loop of colon centrally. Probable constipation.  This report was finalized on 03/12/2019 07:19 by Dr. Renny Whitman MD.    US Carotid Bilateral [[id number]] Updated:  03/11/19 1840          Objective     No Known Allergies    Medication Review: Performed  Current Facility-Administered Medications   Medication Dose Route Frequency Provider Last Rate Last Dose   • aspirin chewable tablet 81 mg  81 mg Oral Daily Zeynep Cruz APRN   81 mg at 03/13/19 0808   • atorvastatin (LIPITOR) tablet 40 mg  40 mg Oral Nightly Markell Obrien MD   40 mg at 03/13/19 2014   • carvedilol (COREG) tablet 3.125 mg  3.125 mg Oral BID With Meals Markell Obrien MD   3.125 mg at 03/13/19 1841   • DOPamine 400 mg/250 mL (1.6 mg/mL) infusion  2-20 mcg/kg/min Intravenous Titrated Vazquez Lowe MD   Stopped at 03/12/19 0813   • escitalopram (LEXAPRO) tablet 20 mg  20 mg Oral Daily Markell Obrien MD   20 mg at  "03/13/19 0809   • ferrous sulfate tablet 325 mg  325 mg Oral BID Markell Obrien MD   325 mg at 03/13/19 2014   • furosemide (LASIX) tablet 40 mg  40 mg Oral Daily Markell Obrien MD   40 mg at 03/13/19 0808   • glipiZIDE (GLUCOTROL) tablet 10 mg  10 mg Oral Daily Markell Obrien MD   10 mg at 03/12/19 0829   • insulin detemir (LEVEMIR) injection 34 Units  34 Units Subcutaneous Nightly Markell Obrien MD   34 Units at 03/13/19 2019   • ondansetron (ZOFRAN) injection 4 mg  4 mg Intravenous Q6H PRN Vazquez Lowe MD   4 mg at 03/11/19 2324   • pantoprazole (PROTONIX) EC tablet 40 mg  40 mg Oral Q AM Markell Obrien MD   40 mg at 03/13/19 0528   • ramipril (ALTACE) capsule 10 mg  10 mg Oral Daily Markell Obrien MD   10 mg at 03/13/19 2014   • rOPINIRole (REQUIP) tablet 4 mg  4 mg Oral Nightly Markell Obrien MD   4 mg at 03/13/19 2014   • sodium chloride 0.9 % infusion  50 mL/hr Intravenous Continuous Markell Obrien MD 50 mL/hr at 03/13/19 0040 50 mL/hr at 03/13/19 0040       Vital Sign Min/Max for last 24 hours  Temp  Min: 97.2 °F (36.2 °C)  Max: 98.8 °F (37.1 °C)   BP  Min: 107/57  Max: 161/79   Pulse  Min: 76  Max: 109   Resp  Min: 16  Max: 28   SpO2  Min: 89 %  Max: 96 %   No Data Recorded   Weight  Min: 88 kg (194 lb 1.6 oz)  Max: 88.5 kg (195 lb)     Flowsheet Rows      First Filed Value   Admission Height  177.8 cm (70\") Documented at 03/11/2019 1227   Admission Weight  90.7 kg (200 lb) Documented at 03/11/2019 1227          Results for orders placed during the hospital encounter of 03/11/19   Adult Transthoracic Echo Complete W/ Cont if Necessary Per Protocol    Narrative · Estimated EF = 33%.  · The following left ventricular wall segments are dyskinetic: mid   anterior, apical anterior, apical lateral, apical inferior, apical septal   and apex.  · Left ventricular diastolic dysfunction.  · No evidence of pulmonary hypertension is present.          Physical Exam:    General Appearance: Awake, alert, in no acute " distress  Eyes: Pupils equal and reactive    Ears: Appear intact with no abnormalities noted  Nose: Nares normal, no drainage  Neck: supple, trachea midline, no carotid bruit and no JVD  Back: no kyphosis present,    Lungs: respirations regular, respirations even and respirations unlabored    Heart: normal S1, S2,  2/6 pansystolic murmur in the left sternal border,  no rub and no click    Abdomen: normal bowel sounds, no tenderness   Skin: no bleeding, bruising or rash  Extremities: no cyanosis  Psychiatric/Behavioral: Negative for agitation, behavioral problems, confusion, the patient does  appear to be nervous/anxious.     Right groin: Arteriotomy site healthy,  No bleeding, swelling or excessive bruising. Preserved distal pulses.      Results Review:   I reviewed the patient's new clinical results.  I reviewed the patient's new imaging results and agree with the interpretation.  I reviewed the patient's other test results and agree with the interpretation  I personally viewed and interpreted the patient's EKG/Telemetry data  Discussed with patient    Reviewed available prior notes, consults, prior visits, laboratory findings, radiology and cardiology relevant reports. Updated chart as applicable. I have reviewed the patient's medical history in detail and updated the computerized patient record as relevant.      Updated patient regarding any new or relevant abnormalities on review of records or any new findings on physical exam. Mentioned to patient about purpose of visit and desirable health short and long term goals and objectives.     Assessment/Plan       ENNIS (dyspnea on exertion)    Coronary artery disease involving native coronary artery of native heart without angina pectoris    History of stroke    Type 2 diabetes mellitus with circulatory disorder, with long-term current use of insulin (CMS/[facility])    Essential hypertension    Smokeless tobacco use  Severe left ventricular systolic dysfunction  Severe mid  left anterior descending coronary artery stenosis with severe stenosis of the diagonal branch  Tortuous proximal left anterior descending coronary artery precluding coronary stenting and   Was hypotensive and bradycardic last night  Was moved to the CCU  Received low-dose dopamine  Dopamine has been discontinued  Lab parameters are reviewed and satisfactory  Hemoglobin A1c is elevated  Further attempts at coronary stenting increases his risk of left main coronary artery dissection    Plan    Discussed with patient as well as multiple family members and nurse taking care of this patient.  Earlier talked to Dr. Orona  Patient is due for aortocoronary bypass surgery tomorrow morning    Continue on current medications  No additional cardiac workup currently required      Markell Obrien MD  03/13/19  10:02 PM    EMR Dragon/Transcription was used to dictate part of this note

## 2019-03-14 NOTE — ANESTHESIA PROCEDURE NOTES
Procedure Performed: Emergent/Open-Heart Anesthesia BARB       Start Time:  3/14/2019 7:49 AM       End Time:   3/14/2019 7:59 AM    Preanesthesia Checklist:  Patient identified, IV assessed, risks and benefits discussed, monitors and equipment assessed, procedure being performed at surgeon's request and anesthesia consent obtained.    General Procedure Information  BARB Placed for monitoring purposes only -- This is not a diagnostic BARB  Diagnostic Indications for Echo:  assessment of surgical repair and hemodynamic monitoring  Location performed:  OR  Intubated  Heart visualized  Probe Type:  Multiplane  Modalities:  2D only and color flow mapping        Anesthesia Information  Performed Personally  Anesthesiologist:  Eugene Mehta MD      Echocardiogram Comments:       Probe was placed atraumatically on first attempt.  Once the ME 4 chamber was obtained, resistance was felt.  Decision was made to not advance the probe any further and only take imaging from that level.

## 2019-03-14 NOTE — ANESTHESIA PROCEDURE NOTES
Airway  Urgency: elective      General Information and Staff    Patient location during procedure: OR  CRNA: Erasmo King CRNA    Indications and Patient Condition  Indications for airway management: airway protection    Preoxygenated: yes  MILS maintained throughout  Mask difficulty assessment: 2 - vent by mask + OA or adjuvant +/- NMBA    Final Airway Details  Final airway type: endotracheal airway      Successful airway: ETT  Cuffed: yes   Successful intubation technique: direct laryngoscopy  Endotracheal tube insertion site: oral  Blade: Holt  Blade size: 2  ETT size (mm): 8.0  Cormack-Lehane Classification: grade I - full view of glottis  Placement verified by: chest auscultation and capnometry   Cuff volume (mL): 5  Measured from: lips  ETT to lips (cm): 22  Number of attempts at approach: 1    Additional Comments  Atraumatic. Mouth, lips, and teeth unchanged from pre-op assessment

## 2019-03-14 NOTE — ANESTHESIA POSTPROCEDURE EVALUATION
"Patient: Gerson Wilhelm    Procedure Summary     Date:  03/14/19 Room / Location:   [facility] OR  /  [facility] OR    Anesthesia Start:  0702 Anesthesia Stop:  1251    Procedure:  CABG X2 WITH LIMA AND RIGHT LEG OVH. PLACEMENT OF RIGHT FEMORAL ARTERIAL LINE (N/A Chest) Diagnosis:       Coronary artery disease involving native coronary artery of native heart without angina pectoris      (Coronary artery disease involving native coronary artery of native heart without angina pectoris [I25.10])    Surgeon:  Richard Orona MD Provider:  Erasmo King CRNA    Anesthesia Type:  general ASA Status:  4          Anesthesia Type: general  Last vitals  BP   122/83 (03/14/19 1330)   Temp   97.5 °F (36.4 °C) (03/14/19 1259)   Pulse   85 (03/14/19 1345)   Resp   22 (03/14/19 0500)     SpO2   95 % (03/14/19 0630)     Post Anesthesia Care and Evaluation    Patient location during evaluation: ICU  Patient participation: complete - patient cannot participate  Level of consciousness: obtunded/minimal responses  Pain management: adequate  Airway patency: patent  Anesthetic complications: No anesthetic complications  PONV Status: none  Cardiovascular status: acceptable  Respiratory status: acceptable  Hydration status: acceptable    Comments: Blood pressure 122/83, pulse 85, temperature 97.5 °F (36.4 °C), temperature source Core, resp. rate 22, height 172.7 cm (68\"), weight 88.3 kg (194 lb 11.2 oz), SpO2 95 %.          "

## 2019-03-14 NOTE — INTERVAL H&P NOTE
H&P updated. The patient was examined and the following changes are noted:  His workup has demonstrated substantial restrictive lung disease as well as depressed left ventricular function.  In the setting of previous CVA with some residual he will carry increase risk for both morbidity and mortality.  That being said he remains a suitable operative candidate.  All questions been answered and he is agreeable to proceed forward.

## 2019-03-14 NOTE — PERIOPERATIVE NURSING NOTE
Patient identified before entering or using armbands, surgical consent verified, and armbands removed.  Cerebral oximeter pads placed on patients forehead just above the eyebrows.  Patient transferred to OR table without difficulty, 5 lead EKG and balloon pump leads covered with tape, and fast patches with tegaderm covers applied.  Cerebral oximeter pads connected to fore site and fast patches connected to defibrillator prior to induction.       ICU notification  Surgery start - [place] at 0830  On bypass - [place] at 0948  Off bypass - [place] at 1205        Vein harvest  Start - 0818  End - 0902      Vein harvest site - OV of LLE    Performed by Aiden Quintero, CST

## 2019-03-14 NOTE — ANESTHESIA PROCEDURE NOTES
Arterial Line      Patient location during procedure: OR   Line placed for hemodynamic monitoring, ABGs/Labs/ISTAT and MD/Surgeon request.  Performed By   JUAN DIEGO: Alvaro Mcgowan CRNA  Preanesthetic Checklist  Completed: patient identified, site marked, surgical consent, pre-op evaluation, timeout performed, IV checked, risks and benefits discussed and monitors and equipment checked  Arterial Line Prep   Prep: alcohol swabs  Patient monitoring: blood pressure monitoring  Arterial Line Procedure   Laterality:left  Location:  radial artery  Catheter size: 20 G   Guidance: palpation technique  Number of attempts: 1  Successful placement: yes          Post Assessment   Dressing Type: secured with tape.   Complications no  Circ/Move/Sens Assessment: normal.   Patient Tolerance: patient tolerated the procedure well with no apparent complications

## 2019-03-15 ENCOUNTER — APPOINTMENT (OUTPATIENT)
Dept: GENERAL RADIOLOGY | Facility: HOSPITAL | Age: 74
End: 2019-03-15

## 2019-03-15 LAB
ALBUMIN SERPL-MCNC: 3.1 G/DL (ref 3.5–5)
ALBUMIN SERPL-MCNC: 3.3 G/DL (ref 3.5–5)
ANION GAP SERPL CALCULATED.3IONS-SCNC: 7 MMOL/L (ref 4–13)
ANION GAP SERPL CALCULATED.3IONS-SCNC: 9 MMOL/L (ref 4–13)
ANISOCYTOSIS BLD QL: ABNORMAL
APTT PPP: 38.1 SECONDS (ref 24.1–34.8)
ARTERIAL PATENCY WRIST A: ABNORMAL
ARTERIAL PATENCY WRIST A: ABNORMAL
ATMOSPHERIC PRESS: 749 MMHG
ATMOSPHERIC PRESS: 750 MMHG
BASE EXCESS BLDA CALC-SCNC: -0.5 MMOL/L (ref 0–2)
BASE EXCESS BLDA CALC-SCNC: -1.5 MMOL/L (ref 0–2)
BDY SITE: ABNORMAL
BDY SITE: ABNORMAL
BODY TEMPERATURE: 37 C
BODY TEMPERATURE: 37 C
BUN BLD-MCNC: 17 MG/DL (ref 5–21)
BUN BLD-MCNC: 18 MG/DL (ref 5–21)
BUN/CREAT SERPL: 12.6 (ref 7–25)
BUN/CREAT SERPL: 12.9 (ref 7–25)
CALCIUM SPEC-SCNC: 8.5 MG/DL (ref 8.4–10.4)
CALCIUM SPEC-SCNC: 8.6 MG/DL (ref 8.4–10.4)
CHLORIDE SERPL-SCNC: 109 MMOL/L (ref 98–110)
CHLORIDE SERPL-SCNC: 110 MMOL/L (ref 98–110)
CLUMPED PLATELETS: PRESENT
CO2 SERPL-SCNC: 25 MMOL/L (ref 24–31)
CO2 SERPL-SCNC: 26 MMOL/L (ref 24–31)
CREAT BLD-MCNC: 1.35 MG/DL (ref 0.5–1.4)
CREAT BLD-MCNC: 1.4 MG/DL (ref 0.5–1.4)
DEPRECATED RDW RBC AUTO: 48.4 FL (ref 40–54)
DEPRECATED RDW RBC AUTO: 48.7 FL (ref 40–54)
EOSINOPHIL # BLD MANUAL: 0.21 10*3/MM3 (ref 0–0.7)
EOSINOPHIL NFR BLD MANUAL: 2 % (ref 0–4)
ERYTHROCYTE [DISTWIDTH] IN BLOOD BY AUTOMATED COUNT: 15.7 % (ref 12–15)
ERYTHROCYTE [DISTWIDTH] IN BLOOD BY AUTOMATED COUNT: 15.9 % (ref 12–15)
GAS FLOW AIRWAY: 3 LPM
GFR SERPL CREATININE-BSD FRML MDRD: 50 ML/MIN/1.73
GFR SERPL CREATININE-BSD FRML MDRD: 52 ML/MIN/1.73
GLUCOSE BLD-MCNC: 121 MG/DL (ref 70–100)
GLUCOSE BLD-MCNC: 129 MG/DL (ref 70–100)
GLUCOSE BLDC GLUCOMTR-MCNC: 106 MG/DL (ref 70–130)
GLUCOSE BLDC GLUCOMTR-MCNC: 107 MG/DL (ref 70–130)
GLUCOSE BLDC GLUCOMTR-MCNC: 122 MG/DL (ref 70–130)
GLUCOSE BLDC GLUCOMTR-MCNC: 127 MG/DL (ref 70–130)
GLUCOSE BLDC GLUCOMTR-MCNC: 129 MG/DL (ref 70–130)
GLUCOSE BLDC GLUCOMTR-MCNC: 133 MG/DL (ref 70–130)
GLUCOSE BLDC GLUCOMTR-MCNC: 141 MG/DL (ref 70–130)
GLUCOSE BLDC GLUCOMTR-MCNC: 142 MG/DL (ref 70–130)
GLUCOSE BLDC GLUCOMTR-MCNC: 156 MG/DL (ref 70–130)
GLUCOSE BLDC GLUCOMTR-MCNC: 210 MG/DL (ref 70–130)
HCO3 BLDA-SCNC: 24 MMOL/L (ref 20–26)
HCO3 BLDA-SCNC: 24.9 MMOL/L (ref 20–26)
HCT VFR BLD AUTO: 33 % (ref 40–52)
HCT VFR BLD AUTO: 33.5 % (ref 40–52)
HGB BLD-MCNC: 11.2 G/DL (ref 14–18)
HGB BLD-MCNC: 11.5 G/DL (ref 14–18)
HOROWITZ INDEX BLD+IHG-RTO: 30 %
INR PPP: 1.26 (ref 0.91–1.09)
INR PPP: 1.27 (ref 0.91–1.09)
LYMPHOCYTES # BLD MANUAL: 0.42 10*3/MM3 (ref 0.72–4.86)
LYMPHOCYTES NFR BLD MANUAL: 4 % (ref 15–45)
LYMPHOCYTES NFR BLD MANUAL: 6 % (ref 4–12)
Lab: ABNORMAL
Lab: ABNORMAL
MCH RBC QN AUTO: 29.7 PG (ref 28–32)
MCH RBC QN AUTO: 29.7 PG (ref 28–32)
MCHC RBC AUTO-ENTMCNC: 33.9 G/DL (ref 33–36)
MCHC RBC AUTO-ENTMCNC: 34.3 G/DL (ref 33–36)
MCV RBC AUTO: 86.6 FL (ref 82–95)
MCV RBC AUTO: 87.5 FL (ref 82–95)
MODALITY: ABNORMAL
MODALITY: ABNORMAL
MONOCYTES # BLD AUTO: 0.63 10*3/MM3 (ref 0.19–1.3)
NEUTROPHILS # BLD AUTO: 8.82 10*3/MM3 (ref 1.87–8.4)
NEUTROPHILS NFR BLD MANUAL: 79 % (ref 39–78)
NEUTS BAND NFR BLD MANUAL: 5 % (ref 0–10)
PCO2 BLDA: 42.6 MM HG (ref 35–45)
PCO2 BLDA: 42.9 MM HG (ref 35–45)
PEEP RESPIRATORY: 5 CM[H2O]
PH BLDA: 7.36 PH UNITS (ref 7.35–7.45)
PH BLDA: 7.37 PH UNITS (ref 7.35–7.45)
PHOSPHATE SERPL-MCNC: 3.1 MG/DL (ref 2.5–4.5)
PHOSPHATE SERPL-MCNC: 3.6 MG/DL (ref 2.5–4.5)
PLATELET # BLD AUTO: 137 10*3/MM3 (ref 130–400)
PLATELET # BLD AUTO: 157 10*3/MM3 (ref 130–400)
PMV BLD AUTO: 10.1 FL (ref 6–12)
PMV BLD AUTO: 10.3 FL (ref 6–12)
PO2 BLDA: 70.3 MM HG (ref 83–108)
PO2 BLDA: 82 MM HG (ref 83–108)
POIKILOCYTOSIS BLD QL SMEAR: ABNORMAL
POLYCHROMASIA BLD QL SMEAR: ABNORMAL
POTASSIUM BLD-SCNC: 4.3 MMOL/L (ref 3.5–5.3)
POTASSIUM BLD-SCNC: 4.5 MMOL/L (ref 3.5–5.3)
PROTHROMBIN TIME: 16.2 SECONDS (ref 11.9–14.6)
PROTHROMBIN TIME: 16.3 SECONDS (ref 11.9–14.6)
PSV: 10 CMH2O
RBC # BLD AUTO: 3.77 10*6/MM3 (ref 4.8–5.9)
RBC # BLD AUTO: 3.87 10*6/MM3 (ref 4.8–5.9)
SAO2 % BLDCOA: 94.3 % (ref 94–99)
SAO2 % BLDCOA: 96.4 % (ref 94–99)
SODIUM BLD-SCNC: 143 MMOL/L (ref 135–145)
SODIUM BLD-SCNC: 143 MMOL/L (ref 135–145)
VARIANT LYMPHS NFR BLD MANUAL: 4 % (ref 0–5)
VENTILATOR MODE: ABNORMAL
VENTILATOR MODE: ABNORMAL
WBC MORPH BLD: NORMAL
WBC NRBC COR # BLD: 10.44 10*3/MM3 (ref 4.8–10.8)
WBC NRBC COR # BLD: 10.5 10*3/MM3 (ref 4.8–10.8)

## 2019-03-15 PROCEDURE — 25010000002 AMIODARONE IN DEXTROSE 5% 360-4.14 MG/200ML-% SOLUTION: Performed by: THORACIC SURGERY (CARDIOTHORACIC VASCULAR SURGERY)

## 2019-03-15 PROCEDURE — 85025 COMPLETE CBC W/AUTO DIFF WBC: CPT | Performed by: THORACIC SURGERY (CARDIOTHORACIC VASCULAR SURGERY)

## 2019-03-15 PROCEDURE — 85007 BL SMEAR W/DIFF WBC COUNT: CPT | Performed by: THORACIC SURGERY (CARDIOTHORACIC VASCULAR SURGERY)

## 2019-03-15 PROCEDURE — 25010000002 ENOXAPARIN PER 10 MG: Performed by: THORACIC SURGERY (CARDIOTHORACIC VASCULAR SURGERY)

## 2019-03-15 PROCEDURE — 94760 N-INVAS EAR/PLS OXIMETRY 1: CPT

## 2019-03-15 PROCEDURE — 25010000002 VANCOMYCIN 10 G RECONSTITUTED SOLUTION: Performed by: THORACIC SURGERY (CARDIOTHORACIC VASCULAR SURGERY)

## 2019-03-15 PROCEDURE — 93010 ELECTROCARDIOGRAM REPORT: CPT | Performed by: INTERNAL MEDICINE

## 2019-03-15 PROCEDURE — 94799 UNLISTED PULMONARY SVC/PX: CPT

## 2019-03-15 PROCEDURE — 82962 GLUCOSE BLOOD TEST: CPT

## 2019-03-15 PROCEDURE — 99024 POSTOP FOLLOW-UP VISIT: CPT | Performed by: NURSE PRACTITIONER

## 2019-03-15 PROCEDURE — 97164 PT RE-EVAL EST PLAN CARE: CPT

## 2019-03-15 PROCEDURE — 25010000002 PHENYLEPHRINE 10 MG/ML SOLUTION 5 ML VIAL: Performed by: THORACIC SURGERY (CARDIOTHORACIC VASCULAR SURGERY)

## 2019-03-15 PROCEDURE — 94640 AIRWAY INHALATION TREATMENT: CPT

## 2019-03-15 PROCEDURE — 97530 THERAPEUTIC ACTIVITIES: CPT

## 2019-03-15 PROCEDURE — 85610 PROTHROMBIN TIME: CPT | Performed by: THORACIC SURGERY (CARDIOTHORACIC VASCULAR SURGERY)

## 2019-03-15 PROCEDURE — 80069 RENAL FUNCTION PANEL: CPT | Performed by: THORACIC SURGERY (CARDIOTHORACIC VASCULAR SURGERY)

## 2019-03-15 PROCEDURE — 93005 ELECTROCARDIOGRAM TRACING: CPT | Performed by: THORACIC SURGERY (CARDIOTHORACIC VASCULAR SURGERY)

## 2019-03-15 PROCEDURE — 25010000002 FUROSEMIDE PER 20 MG: Performed by: THORACIC SURGERY (CARDIOTHORACIC VASCULAR SURGERY)

## 2019-03-15 PROCEDURE — 25010000002 CEFAZOLIN PER 500 MG: Performed by: THORACIC SURGERY (CARDIOTHORACIC VASCULAR SURGERY)

## 2019-03-15 PROCEDURE — 82803 BLOOD GASES ANY COMBINATION: CPT

## 2019-03-15 PROCEDURE — 25810000003 DEXTROSE 5 % WITH KCL 20 MEQ 20-5 MEQ/L-% SOLUTION: Performed by: THORACIC SURGERY (CARDIOTHORACIC VASCULAR SURGERY)

## 2019-03-15 PROCEDURE — 99232 SBSQ HOSP IP/OBS MODERATE 35: CPT | Performed by: INTERNAL MEDICINE

## 2019-03-15 PROCEDURE — 25010000002 VANCOMYCIN PER 500 MG: Performed by: THORACIC SURGERY (CARDIOTHORACIC VASCULAR SURGERY)

## 2019-03-15 PROCEDURE — 97116 GAIT TRAINING THERAPY: CPT

## 2019-03-15 PROCEDURE — 25010000002 ONDANSETRON PER 1 MG: Performed by: THORACIC SURGERY (CARDIOTHORACIC VASCULAR SURGERY)

## 2019-03-15 PROCEDURE — 85730 THROMBOPLASTIN TIME PARTIAL: CPT | Performed by: THORACIC SURGERY (CARDIOTHORACIC VASCULAR SURGERY)

## 2019-03-15 PROCEDURE — 63710000001 INSULIN LISPRO (HUMAN) PER 5 UNITS: Performed by: THORACIC SURGERY (CARDIOTHORACIC VASCULAR SURGERY)

## 2019-03-15 PROCEDURE — 85027 COMPLETE CBC AUTOMATED: CPT | Performed by: THORACIC SURGERY (CARDIOTHORACIC VASCULAR SURGERY)

## 2019-03-15 PROCEDURE — 71045 X-RAY EXAM CHEST 1 VIEW: CPT

## 2019-03-15 RX ORDER — FUROSEMIDE 10 MG/ML
20 INJECTION INTRAMUSCULAR; INTRAVENOUS ONCE
Status: COMPLETED | OUTPATIENT
Start: 2019-03-15 | End: 2019-03-15

## 2019-03-15 RX ORDER — NICOTINE POLACRILEX 4 MG
15 LOZENGE BUCCAL
Status: DISCONTINUED | OUTPATIENT
Start: 2019-03-15 | End: 2019-03-16 | Stop reason: SDUPTHER

## 2019-03-15 RX ORDER — DEXTROSE MONOHYDRATE 25 G/50ML
25 INJECTION, SOLUTION INTRAVENOUS
Status: DISCONTINUED | OUTPATIENT
Start: 2019-03-15 | End: 2019-03-16 | Stop reason: SDUPTHER

## 2019-03-15 RX ORDER — VANCOMYCIN HYDROCHLORIDE 1 G/200ML
15 INJECTION, SOLUTION INTRAVENOUS EVERY 12 HOURS
Status: COMPLETED | OUTPATIENT
Start: 2019-03-15 | End: 2019-03-16

## 2019-03-15 RX ORDER — FUROSEMIDE 10 MG/ML
20 INJECTION INTRAMUSCULAR; INTRAVENOUS ONCE
Status: COMPLETED | OUTPATIENT
Start: 2019-03-16 | End: 2019-03-15

## 2019-03-15 RX ADMIN — AMIODARONE HYDROCHLORIDE 400 MG: 200 TABLET ORAL at 18:45

## 2019-03-15 RX ADMIN — CHLORHEXIDINE GLUCONATE 15 ML: 1.2 RINSE ORAL at 00:03

## 2019-03-15 RX ADMIN — ENOXAPARIN SODIUM 30 MG: 30 INJECTION SUBCUTANEOUS at 21:33

## 2019-03-15 RX ADMIN — OXYCODONE HYDROCHLORIDE AND ACETAMINOPHEN 1 TABLET: 10; 325 TABLET ORAL at 06:04

## 2019-03-15 RX ADMIN — SODIUM CHLORIDE 2 G: 9 INJECTION, SOLUTION INTRAVENOUS at 13:24

## 2019-03-15 RX ADMIN — MUPIROCIN 10 APPLICATION: 20 OINTMENT TOPICAL at 18:46

## 2019-03-15 RX ADMIN — AMIODARONE HYDROCHLORIDE 0.5 MG/MIN: 1.8 INJECTION, SOLUTION INTRAVENOUS at 06:32

## 2019-03-15 RX ADMIN — CLOPIDOGREL 75 MG: 75 TABLET, FILM COATED ORAL at 18:45

## 2019-03-15 RX ADMIN — POTASSIUM CHLORIDE AND DEXTROSE MONOHYDRATE 30 ML/HR: 150; 5 INJECTION, SOLUTION INTRAVENOUS at 15:56

## 2019-03-15 RX ADMIN — AMIODARONE HYDROCHLORIDE 1 MG/MIN: 1.8 INJECTION, SOLUTION INTRAVENOUS at 05:48

## 2019-03-15 RX ADMIN — OXYCODONE AND ACETAMINOPHEN 1 TABLET: 5; 325 TABLET ORAL at 03:01

## 2019-03-15 RX ADMIN — ENOXAPARIN SODIUM 30 MG: 30 INJECTION SUBCUTANEOUS at 09:27

## 2019-03-15 RX ADMIN — AMIODARONE HYDROCHLORIDE 1 MG/MIN: 1.8 INJECTION, SOLUTION INTRAVENOUS at 00:04

## 2019-03-15 RX ADMIN — BISACODYL 10 MG: 5 TABLET ORAL at 09:27

## 2019-03-15 RX ADMIN — PHENYLEPHRINE HYDROCHLORIDE 0.5 MCG/KG/MIN: 10 INJECTION INTRAVENOUS at 00:24

## 2019-03-15 RX ADMIN — OXYCODONE AND ACETAMINOPHEN 1 TABLET: 5; 325 TABLET ORAL at 21:27

## 2019-03-15 RX ADMIN — CHLORHEXIDINE GLUCONATE 15 ML: 1.2 RINSE ORAL at 09:27

## 2019-03-15 RX ADMIN — OXYCODONE HYDROCHLORIDE AND ACETAMINOPHEN 1 TABLET: 10; 325 TABLET ORAL at 10:13

## 2019-03-15 RX ADMIN — IPRATROPIUM BROMIDE AND ALBUTEROL SULFATE 3 ML: 2.5; .5 SOLUTION RESPIRATORY (INHALATION) at 10:57

## 2019-03-15 RX ADMIN — SODIUM CHLORIDE 1000 ML: 9 INJECTION, SOLUTION INTRAVENOUS at 10:55

## 2019-03-15 RX ADMIN — IPRATROPIUM BROMIDE AND ALBUTEROL SULFATE 3 ML: 2.5; .5 SOLUTION RESPIRATORY (INHALATION) at 14:26

## 2019-03-15 RX ADMIN — SODIUM CHLORIDE 2 G: 9 INJECTION, SOLUTION INTRAVENOUS at 05:51

## 2019-03-15 RX ADMIN — SODIUM CHLORIDE 2 G: 9 INJECTION, SOLUTION INTRAVENOUS at 21:28

## 2019-03-15 RX ADMIN — IPRATROPIUM BROMIDE AND ALBUTEROL SULFATE 3 ML: 2.5; .5 SOLUTION RESPIRATORY (INHALATION) at 00:08

## 2019-03-15 RX ADMIN — POTASSIUM CHLORIDE AND DEXTROSE MONOHYDRATE 30 ML/HR: 150; 5 INJECTION, SOLUTION INTRAVENOUS at 15:55

## 2019-03-15 RX ADMIN — AMIODARONE HYDROCHLORIDE 400 MG: 200 TABLET ORAL at 11:41

## 2019-03-15 RX ADMIN — MUPIROCIN 10 APPLICATION: 20 OINTMENT TOPICAL at 05:48

## 2019-03-15 RX ADMIN — POTASSIUM CHLORIDE AND DEXTROSE MONOHYDRATE 30 ML/HR: 150; 5 INJECTION, SOLUTION INTRAVENOUS at 00:12

## 2019-03-15 RX ADMIN — BISACODYL 10 MG: 5 TABLET ORAL at 21:27

## 2019-03-15 RX ADMIN — OXYCODONE HYDROCHLORIDE AND ACETAMINOPHEN 1 TABLET: 10; 325 TABLET ORAL at 14:07

## 2019-03-15 RX ADMIN — ATORVASTATIN CALCIUM 20 MG: 10 TABLET, FILM COATED ORAL at 21:28

## 2019-03-15 RX ADMIN — ONDANSETRON HYDROCHLORIDE 4 MG: 2 INJECTION, SOLUTION INTRAMUSCULAR; INTRAVENOUS at 03:25

## 2019-03-15 RX ADMIN — VANCOMYCIN HYDROCHLORIDE 1000 MG: 1 INJECTION, SOLUTION INTRAVENOUS at 18:49

## 2019-03-15 RX ADMIN — IPRATROPIUM BROMIDE AND ALBUTEROL SULFATE 3 ML: 2.5; .5 SOLUTION RESPIRATORY (INHALATION) at 21:44

## 2019-03-15 RX ADMIN — FUROSEMIDE 20 MG: 10 INJECTION, SOLUTION INTRAVENOUS at 16:39

## 2019-03-15 RX ADMIN — POTASSIUM CHLORIDE AND DEXTROSE MONOHYDRATE 30 ML/HR: 150; 5 INJECTION, SOLUTION INTRAVENOUS at 00:11

## 2019-03-15 RX ADMIN — AMIODARONE HYDROCHLORIDE 400 MG: 200 TABLET ORAL at 23:19

## 2019-03-15 RX ADMIN — INSULIN LISPRO 4 UNITS: 100 INJECTION, SOLUTION INTRAVENOUS; SUBCUTANEOUS at 21:28

## 2019-03-15 RX ADMIN — FUROSEMIDE 20 MG: 10 INJECTION, SOLUTION INTRAVENOUS at 23:17

## 2019-03-15 RX ADMIN — AMIODARONE HYDROCHLORIDE 400 MG: 200 TABLET ORAL at 06:32

## 2019-03-15 RX ADMIN — CHLORHEXIDINE GLUCONATE 15 ML: 1.2 RINSE ORAL at 21:28

## 2019-03-15 RX ADMIN — NITROGLYCERIN 5 MCG/MIN: 20 INJECTION INTRAVENOUS at 00:12

## 2019-03-15 RX ADMIN — IPRATROPIUM BROMIDE AND ALBUTEROL SULFATE 3 ML: 2.5; .5 SOLUTION RESPIRATORY (INHALATION) at 07:06

## 2019-03-15 RX ADMIN — METOPROLOL TARTRATE 12.5 MG: 25 TABLET, FILM COATED ORAL at 21:28

## 2019-03-15 RX ADMIN — VANCOMYCIN HYDROCHLORIDE 1250 MG: 10 INJECTION, POWDER, LYOPHILIZED, FOR SOLUTION INTRAVENOUS at 06:32

## 2019-03-15 RX ADMIN — METOPROLOL TARTRATE 12.5 MG: 25 TABLET, FILM COATED ORAL at 09:27

## 2019-03-15 NOTE — THERAPY TREATMENT NOTE
Acute Care - Physical Therapy Treatment Note  [facility]     Patient Name: Gerson Wilhelm  : 1945  MRN: [id number]  Today's Date: 3/15/2019  Onset of Illness/Injury or Date of Surgery: 19  Date of Referral to PT: 19  Referring Physician: Dr Orona    Admit Date: 3/11/2019    Visit Dx:    ICD-10-CM ICD-9-CM   1. ENNIS (dyspnea on exertion) R06.09 786.09   2. Impaired mobility Z74.09 799.89   3. Coronary artery disease involving native coronary artery of native heart without angina pectoris I25.10 414.01     Patient Active Problem List   Diagnosis   • ENNIS (dyspnea on exertion)   • Coronary artery disease involving native coronary artery of native heart without angina pectoris   • History of stroke   • Type 2 diabetes mellitus with circulatory disorder, with long-term current use of insulin (CMS/[facility]ly [facility])   • Essential hypertension   • Smokeless tobacco use       Therapy Treatment    Rehabilitation Treatment Summary     Row Name 03/15/19 1438             Treatment Time/Intention    Discipline  physical therapy assistant  -      Document Type  therapy note (daily note)  -      Subjective Information  complains of;weakness;pain  -MF2      Mode of Treatment  physical therapy  -MF2      Existing Precautions/Restrictions  fall;sternal [place]  -MF2      Treatment Considerations/Comments  chest tube, GERSON drain  -MF2      Recorded by [MF] Shae Greenberg, PTA 03/15/19 1439  [MF2] Shae Greenberg, PTA 03/15/19 1617      Row Name 03/15/19 1438             Vital Signs    Pre Systolic BP Rehab  117  -MF      Pre Treatment Diastolic BP  84  -MF      Post Systolic BP Rehab  108  -MF2      Post Treatment Diastolic BP  87  -MF2      Pretreatment Heart Rate (beats/min)  106  -MF      Posttreatment Heart Rate (beats/min)  108  -MF2      Pre SpO2 (%)  90  -MF      O2 Delivery Pre Treatment  supplemental O2 3L  -MF2      O2 Delivery Intra Treatment  supplemental O2 3L  -MF2      Post SpO2 (%)  90  -MF2      O2  Delivery Post Treatment  supplemental O2 3L  -MF2      Pre Patient Position  Sitting  -MF2      Intra Patient Position  Standing  -MF2      Post Patient Position  Sitting  -MF2      Recorded by [MF] Shae Greenberg, [facility] 03/15/19 1441  [MF2] Shae Greenberg, [facility] 03/15/19 1617      Row Name 03/15/19 1438             Bed Mobility Assessment/Treatment    Comment (Bed Mobility)  up in chair  -MF      Recorded by [MF] Shae Greenberg, [facility] 03/15/19 1617      Row Name 03/15/19 1438             Sit-Stand Transfer    Sit-Stand [place] (Transfers)  verbal cues;moderate assist (50% patient effort);2 person assist  -MF      Recorded by [MF] Shae Greenberg, [facility] 03/15/19 1617      Row Name 03/15/19 1438             Stand-Sit Transfer    Stand-Sit [place] (Transfers)  verbal cues;minimum assist (75% patient effort);2 person assist  -MF      Recorded by [MF] Shae Greenberg, [facility] 03/15/19 1617      Row Name 03/15/19 1438             Gait/Stairs Assessment/Training    [place] Level (Gait)  verbal cues;minimum assist (75% patient effort);moderate assist (50% patient effort);2 person assist  -MF      Assistive Device (Gait)  -- HHA  -MF      Distance in Feet (Gait)  75  -MF      Deviations/Abnormal Patterns (Gait)  stride length decreased;iban decreased heavy posterior lean at times  -MF      Bilateral Gait Deviations  heel strike decreased  -MF      Comment (Gait/Stairs)  Pt. ha great dificulty getting starting, but once outside of room, he took better steps.   -MF      Recorded by [MF] Shae Greenberg, [facility] 03/15/19 1617      Row Name 03/15/19 1438             Therapeutic Exercise    Comment (Therapeutic Exercise)  cardiac protocol x 10 reps  -MF      Recorded by [MF] Shae Greenberg, [facility] 03/15/19 1617      Row Name 03/15/19 1438             Positioning and Restraints    Pre-Treatment Position  sitting in chair/recliner  -MF      Post Treatment Position  chair  -MF      In Chair  notified  nsg;reclined;call light within reach;encouraged to call for assist  -MF      Recorded by [MF] Shae Greenberg, PTA 03/15/19 1617      Row Name 03/15/19 1438             Pain Scale: Numbers Pre/Post-Treatment    Pain Scale: Numbers, Pretreatment  7/10  -MF      Pain Location - Orientation  incisional  -MF      Pain Location  chest also B legs  -MF      Pain Intervention(s)  Medication (See MAR);Repositioned  -MF      Recorded by [MF] Shae Greenberg, PTA 03/15/19 1617      Row Name                Wound 03/14/19 0856 Other (See comments) chest incision    Wound - Properties Group Date first assessed: 03/14/19 [DS] Time first assessed: 0856 [DS] Side: Other (See comments) [DS] Location: chest [DS] Type: incision [DS] Recorded by:  [DS] Bert Isaac RN 03/14/19 0856    Row Name                Wound 03/14/19 0856 Left leg incision    Wound - Properties Group Date first assessed: 03/14/19 [DS] Time first assessed: 0856 [DS] Side: Left [DS] Location: leg [DS] Type: incision [DS] Recorded by:  [DS] Bert Isaac RN 03/14/19 0856      User Key  (r) = Recorded By, (t) = Taken By, (c) = Cosigned By    Initials Name Effective Dates Discipline    DS Bert Isaac RN 08/02/16 -  Nurse    MF Shae Greenberg, PTA 08/02/16 -  PT          Wound 03/14/19 0856 Other (See comments) chest incision (Active)   Dressing Appearance dry;intact 3/15/2019  3:50 PM   Closure RICHIE 3/15/2019  3:50 PM   Base dressing in place, unable to visualize 3/15/2019  3:50 PM   Dressing Care, Wound gauze 3/15/2019  3:50 PM       Wound 03/14/19 0856 Left leg incision (Active)   Dressing Appearance dry;intact 3/15/2019  3:50 PM   Closure RICHIE 3/15/2019  3:50 PM   Base dressing in place, unable to visualize 3/15/2019  3:50 PM   Dressing Care, Wound gauze 3/15/2019  3:50 PM       Rehab Goal Summary     Row Name 03/15/19 0815             Physical Therapy Goals    Bed Mobility Goal Selection (PT)  bed mobility, PT goal 1  -DARREN      Transfer  Goal Selection (PT)  transfer, PT goal 1  -DARREN      Gait Training Goal Selection (PT)  gait training, PT goal 1  -DARREN      Stairs Goal Selection (PT)  stairs, PT goal 1  -DARREN         Bed Mobility Goal 1 (PT)    Activity/Assistive Device (Bed Mobility Goal 1, PT)  sit to supine;supine to sit  -DARREN      [place] Level/Cues Needed (Bed Mobility Goal 1, PT)  supervision required  -DARREN      Time Frame (Bed Mobility Goal 1, PT)  long term goal (LTG);10 days  -DARREN      Progress/Outcomes (Bed Mobility Goal 1, PT)  goal not met;goal ongoing  -DARREN         Transfer Goal 1 (PT)    Activity/Assistive Device (Transfer Goal 1, PT)  sit-to-stand/stand-to-sit;bed-to-chair/chair-to-bed  -DARREN      [place] Level/Cues Needed (Transfer Goal 1, PT)  supervision required  -DARREN      Time Frame (Transfer Goal 1, PT)  long term goal (LTG);10 days  -DARREN      Progress/Outcome (Transfer Goal 1, PT)  goal not met;goal ongoing  -DARREN         Gait Training Goal 1 (PT)    Activity/Assistive Device (Gait Training Goal 1, PT)  gait (walking locomotion);decrease fall risk;improve balance and speed;increase endurance/gait distance  -DARERN      [place] Level (Gait Training Goal 1, PT)  supervision required  -DARREN      Distance (Gait Goal 1, PT)  300  -DARREN      Time Frame (Gait Training Goal 1, PT)  long term goal (LTG);10 days  -DARREN      Progress/Outcome (Gait Training Goal 1, PT)  goal not met;goal ongoing  -DARREN         Stairs Goal 1 (PT)    Activity/Assistive Device (Stairs Goal 1, PT)  ascending stairs;descending stairs  -DARREN      [place] Level/Cues Needed (Stairs Goal 1, PT)  contact guard assist  -DARREN      Number of Stairs (Stairs Goal 1, PT)  3  -DARREN      Time Frame (Stairs Goal 1, PT)  long term goal (LTG);10 days  -DARREN      Progress/Outcome (Stairs Goal 1, PT)  goal not met;goal ongoing  -DARREN        User Key  (r) = Recorded By, (t) = Taken By, (c) = Cosigned By    Initials Name Provider Type Discipline    Dinh Peace, PT DPT Physical Therapist  PT          Physical Therapy Education     Title: PT OT SLP Therapies (In Progress)     Topic: Physical Therapy (In Progress)     Point: Mobility training (In Progress)     Learning Progress Summary           Patient Acceptance, E, NR by DARREN at 3/15/2019  8:15 AM    Comment:  Educated pt. on progression of PT POC, benefits of activity, sternal precautions.    Acceptance, E, VU,DU,NR by DARREN at 3/13/2019  2:30 PM    Comment:  Educated pt/spouse on progression of PT POC and benefits of activity. Answered pre questions about open heart surgery being performed tomorrow and educated on sternal precautions.   Significant Other Acceptance, E, VU,DU,NR by DARREN at 3/13/2019  2:30 PM    Comment:  Educated pt/spouse on progression of PT POC and benefits of activity. Answered pre questions about open heart surgery being performed tomorrow and educated on sternal precautions.                   Point: Precautions (In Progress)     Learning Progress Summary           Patient Acceptance, E, NR by DARREN at 3/15/2019  8:15 AM    Comment:  Educated pt. on progression of PT POC, benefits of activity, sternal precautions.                               User Key     Initials Effective Dates Name Provider Type Discipline    DARREN 08/02/16 -  Dinh Melgar, PT DPT Physical Therapist PT                PT Recommendation and Plan        Outcome Measures     Row Name 03/15/19 0815 03/13/19 1390          How much help from another person do you currently need...    Turning from your back to your side while in flat bed without using bedrails?  2  -DARREN  3  -DARREN     Moving from lying on back to sitting on the side of a flat bed without bedrails?  2  -DARREN  3  -DARREN     Moving to and from a bed to a chair (including a wheelchair)?  2  -DARREN  3  -DARREN     Standing up from a chair using your arms (e.g., wheelchair, bedside chair)?  2  -DARREN  3  -DARREN     Climbing 3-5 steps with a railing?  1  -DARREN  3  -DARREN     To walk in hospital room?  2  -DARREN  3  -DARREN     AM-PAC 6 Clicks  Score  11  -DARREN  18  -DARREN        Functional Assessment    Outcome Measure Options  AM-PAC 6 Clicks Basic Mobility (PT)  -DARREN  AM-PAC 6 Clicks Basic Mobility (PT)  -DARREN       User Key  (r) = Recorded By, (t) = Taken By, (c) = Cosigned By    Initials Name Provider Type    Dinh Peace, PT DPT Physical Therapist         Time Calculation:   PT Charges     Row Name 03/15/19 1617 03/15/19 1008          Time Calculation    Start Time  1438  -MF  0815  -DARREN     Stop Time  1502  -MF  0911  -DARREN     Time Calculation (min)  24 min  -MF  56 min  -DARREN     PT Received On  03/15/19  -MF  03/15/19  -DARREN     PT Goal Re-Cert Due Date  03/25/19  -  03/25/19  -DARREN        Time Calculation- PT    Total Timed Code Minutes- PT  24 minute(s)  -MF  26 minute(s)  -DARREN        Timed Charges    04138 - Gait Training Minutes   24  -MF  --     74018 - PT Therapeutic Activity Minutes  --  26  -DARREN       User Key  (r) = Recorded By, (t) = Taken By, (c) = Cosigned By    Initials Name Provider Type    Dinh Peace, PT DPT Physical Therapist    Shae Ware, PTA Physical Therapy Assistant        Therapy Suggested Charges     Code   Minutes Charges    02717 (CPT®) Hc Pt Neuromusc Re Education Ea 15 Min      32238 (CPT®) Hc Pt Ther Proc Ea 15 Min      57945 (CPT®) Hc Gait Training Ea 15 Min 24 2    89035 (CPT®) Hc Pt Therapeutic Act Ea 15 Min      04414 (CPT®) Hc Pt Manual Therapy Ea 15 Min      75368 (CPT®) Hc Pt Iontophoresis Ea 15 Min      43594 (CPT®) Hc Pt Elec Stim Ea-Per 15 Min      56605 (CPT®) Hc Pt Ultrasound Ea 15 Min      27317 (CPT®) Hc Pt Self Care/Mgmt/Train Ea 15 Min      29501 (CPT®) Hc Pt Prosthetic (S) Train Initial Encounter, Each 15 Min      72200 (CPT®) Hc Pt Orthotic(S)/Prosthetic(S) Encounter, Each 15 Min      65207 (CPT®) Hc Orthotic(S) Mgmt/Train Initial Encounter, Each 15min      Total  24 2        Therapy Charges for Today     Code Description Service Date Service Provider Modifiers Qty    86010880690 HC GAIT  TRAINING EA 15 MIN 3/15/2019 Shae Greenberg, PTA GP 2          PT G-Codes  Outcome Measure Options: AM-PAC 6 Clicks Basic Mobility (PT)  AM-PAC 6 Clicks Score: 11    Shae Greenberg, WILLIAM  3/15/2019

## 2019-03-15 NOTE — PROGRESS NOTES
Continued Stay Note   [facility]     Patient Name: Gerson Wilhelm  MRN: [id number]  Today's Date: 3/15/2019    Admit Date: 3/11/2019    Discharge Plan     Row Name 03/15/19 1419       Plan    Plan  Home vs rehab?    Plan Comments  Patient in ICU post op CABG.  Will follow patient's progress to determine plan/needs.        Discharge Codes    No documentation.             MARION Zhou

## 2019-03-15 NOTE — PROGRESS NOTES
"CABG x 2, Right leg open vein harvest, placement of right femoral arterial line    POD 1    Extubated overnight and on 3 liters nasal cannula with O2 sat 95%. . Patient up in the chair and moaning at times. Reports right leg and chest pain. Nursing report patient refused pain medications overnight but has taken pain pill this morning. Having bladder spasms at times.     IV gtts: Amiodarone, IVF, Nitroglycerin, Jamin-off  Telemetry: ST 110s  Hemodynamics: CO 6.7, CI 3.32, CVP 3, PAP 40/12 mean 22    Visit Vitals  /76   Pulse 118   Temp 99 °F (37.2 °C) (Axillary)   Resp 22   Ht 172.7 cm (68\")   Wt 88.3 kg (194 lb 11.2 oz)   SpO2 93%   BMI 29.60 kg/m²       Intake/Output Summary (Last 24 hours) at 3/15/2019 1044  Last data filed at 3/15/2019 0736  Gross per 24 hour   Intake 4303.2 ml   Output 2867 ml   Net 1436.2 ml     MST output: 154 ml  L Caden drain: 170 ml    Labs:      Chest x ray: right linear atelectasis, left basilar atelectasis, no pneumothorax    Physical Exam:  General: No apparent distress. Up in the chair.   Cardiovascular: Tachycardiac with regular rhythm without murmur, rubs, or gallops.    Pulmonary: Clear to auscultation bilaterally without wheezing, rubs, or rales.  Chest: Sternotomy incision clean, dry, and intact. Sternum stable. No clicks. Mediastinal tubes x 2 and caden drain x 1 with dressing clean, dry, and intact.    Abdomen: Soft, non-distended, and non-tender.  Extremities: Warm, moves all extremities. Saphenectomy site clean, dry, and intact with ace wrap.   Neurologic: Grossly intact with no focal deficits. Moans at times.      Impression:  Coronary artery disease status post CABG   Left ventricular dysfuction  Type 2 diabetes mellitus  Hypertension  History of stroke  Restrictive lung disease  Hiatal hernia, large    Plan:  1 liter fluid bolus now  Jamin gtt off-continue to monitor BP  Encourage pulmonary toilet and ambulation, encourage pain regimen  Routine post cardiac surgery " regimen  Keep in unit, keep johnson catheter

## 2019-03-15 NOTE — PROGRESS NOTES
PULMONARY AND CRITICAL CARE PROGRESS NOTE - [facility]    Patient: Gerson Wilhelm  1945   MR# [id number]   Acct# [id number]  03/15/19   3:36 PM  Referring Provider: Richard Orona MD    Chief Complaint: s/p CABG  Interval history: The patient is doing well status post CABG.  He extubated last p.m.  He is resting in bed on 3 L nasal cannula, 94%.  He states he does have some pain with cough.  Amiodarone and nitroglycerin drips infusing.  Mediastinal tube and drains are intact. No other aggravating or allevitating factors.     Meds:    amiodarone 400 mg Oral Q6H   [START ON 3/16/2019] aspirin 81 mg Oral Daily   atorvastatin 20 mg Oral Nightly   bisacodyl 10 mg Oral BID   ceFAZolin 2 g Intravenous Q8H   chlorhexidine 15 mL Mouth/Throat Q12H   clopidogrel 75 mg Oral Daily   enoxaparin 30 mg Subcutaneous Q12H   ipratropium-albuterol 3 mL Nebulization 4x Daily - RT   metoprolol tartrate 12.5 mg Oral Q12H   mupirocin  Each Nare Q12H   vancomycin 15 mg/kg (Ideal) Intravenous Q12H       amiodarone 0.5 mg/min Last Rate: Stopped (03/15/19 1105)   dextrose 5 % with KCl 20 mEq 30 mL/hr Last Rate: 30 mL/hr (03/15/19 0012)   And     dextrose 5 % with KCl 20 mEq 30 mL/hr Last Rate: 30 mL/hr (03/15/19 0011)   hold 1 each    insulin regular infusion 1 unit/mL 1-20 Units/hr Last Rate: Stopped (03/15/19 0723)   niCARdipine 5-15 mg/hr    nitroglycerin 5 mcg/min Last Rate: Stopped (03/15/19 1052)   phenylephrine 0.5-3 mcg/kg/min Last Rate: Stopped (03/15/19 0633)     Review of Systems:   Review of Systems   Constitutional: Negative for chills and fever.   Respiratory: Negative for cough and shortness of breath.    Cardiovascular: Positive for chest pain (with cough ).   Gastrointestinal: Negative for diarrhea and vomiting.     Physical Exam:  SpO2 Percentage    03/15/19 1420 03/15/19 1426 03/15/19 1432   SpO2: 94% 95% 98%     Temp:  [99 °F (37.2 °C)-102 °F (38.9 °C)] 99 °F (37.2 °C)  Heart Rate:  []  112  Resp:  [20-28] 24  BP: ()/() 117/84  Arterial Line BP: ()/(46-80) 97/52  FiO2 (%):  [50 %-60 %] 50 %    Intake/Output Summary (Last 24 hours) at 3/15/2019 1536  Last data filed at 3/15/2019 1143  Gross per 24 hour   Intake 3724.7 ml   Output 1020 ml   Net 2704.7 ml     Physical Exam   Constitutional: He appears well-developed and well-nourished. Nasal cannula in place.   HENT:   Head: Normocephalic and atraumatic.   Eyes: Conjunctivae and EOM are normal. Pupils are equal, round, and reactive to light. No scleral icterus.   Neck: Normal range of motion. Neck supple.   Cardiovascular: Normal rate, regular rhythm and normal heart sounds. Exam reveals no friction rub.   No murmur heard.  Mediastinal tubes and drains intact   Pulmonary/Chest: He has decreased breath sounds. He has no wheezes. He has no rales.   Abdominal: Soft. Bowel sounds are normal. He exhibits no distension. There is no tenderness.   Genitourinary:   Genitourinary Comments: [place] catheter intact   Musculoskeletal: Normal range of motion. He exhibits no edema.   Neurological: He is alert.   Skin: Skin is warm and dry.   Psychiatric: He has a normal mood and affect. His behavior is normal.   Nursing note and vitals reviewed.    Laboratory Data:  Results from last 7 days   Lab Units 03/15/19  0323 03/15/19  0000 03/14/19  1812   WBC 10*3/mm3 10.50 10.44 12.15*   HEMOGLOBIN g/dL 11.5* 11.2* 11.8*   PLATELETS 10*3/mm3 137 157 170     Results from last 7 days   Lab Units 03/15/19  0323 03/15/19  0000 03/14/19  1812   SODIUM mmol/L 143 143 140   POTASSIUM mmol/L 4.5 4.3 4.4   BUN mg/dL 18 17 17   CREATININE mg/dL 1.40 1.35 1.34   INR  1.26* 1.27* 1.26*     Results from last 7 days   Lab Units 03/15/19  0442 03/15/19  0132 03/14/19  2347 03/14/19  2225   PH, ARTERIAL pH units 7.358 7.372 7.385 7.383   PCO2, ARTERIAL mm Hg 42.6 42.9 42.2 42.6   PO2 ART mm Hg 70.3* 82.0* 84.4 89.8   FIO2 %  --  30 30 35     Recent films:  EXAMINATION: XR  CHEST 1 VW-     3/15/2019 3:10 AM CDT     HISTORY: Post-Op Heart Surgery; Z74.09-Other reduced mobility;  R06.09-Other forms of dyspnea; I25.10-Atherosclerotic heart disease of  native coronary artery without angina pectoris.     One view chest x-ray compared with yesterday.     Low lung volumes with persistent left basilar atelectasis.  Partial clearing of the right lower lobe.     No pneumothorax is seen.     Interval extubation.  The [place] catheter remains in place.     Summary:  1. Extubation with partial clearing of right lower lobe atelectasis.  2. Persistent left lower lobe atelectasis.  This report was finalized on 03/15/2019 07:40 by Dr. Cuong Dias MD    Films reviewed personally by me.  My interpretation: Extubated, atelectasis  Pulmonary Assessment:  1. Restrictive lung disease.  2. Status post coronary artery bypass graft surgery, the patient is off the ventilator postoperatively.    Recommend:   · Continue pulmonary hygiene measures.  · Continue to wean oxygen as tolerated  · Continue bronchodilators  · Mobilize  · Pulmonary will sign off, call if needed.    Electronically signed by HALEY Pickens, 03/15/19, 3:36 PM   Physician substantive contribution:  Pertinent symptoms/interval history include: He is doing well post extubation.  Respiratory exam shows pertinent findings of fair air movement on chest exam.  Plan includes: I would recommend he follow-up with his primary care physician or cardiologist regarding a possible sleep study in the future.  Pulmonary will sign off.  I have seen and examined patient personally, performing a face-to-face diagnostic evaluation with plan of care reviewed and developed with APRN and nursing staff. I have addended and/or modified the above history of present illness, physical examination, and assessment and plan to reflect my findings and impressions. Essential elements of the care plan were discussed with APRN above.  Agree with findings and  assessment/plan as documented above.    Electronically signed by Rashaad Loera MD, on 3/15/2019, 5:20 PM

## 2019-03-15 NOTE — THERAPY RE-EVALUATION
Acute Care - Physical Therapy Re-Evaluation  [facility]     Patient Name: Gerson Wilhlem  : 1945  MRN: [id number]  Today's Date: 3/15/2019   Onset of Illness/Injury or Date of Surgery: 19  Date of Referral to PT: 19  Referring Physician: Dr Orona      Admit Date: 3/11/2019    Visit Dx:     ICD-10-CM ICD-9-CM   1. ENNIS (dyspnea on exertion) R06.09 786.09   2. Impaired mobility Z74.09 799.89   3. Coronary artery disease involving native coronary artery of native heart without angina pectoris I25.10 414.01     Patient Active Problem List   Diagnosis   • ENNIS (dyspnea on exertion)   • Coronary artery disease involving native coronary artery of native heart without angina pectoris   • History of stroke   • Type 2 diabetes mellitus with circulatory disorder, with long-term current use of insulin (CMS/[facility])   • Essential hypertension   • Smokeless tobacco use     Past Medical History:   Diagnosis Date   • CHF (congestive heart failure) (CMS/[facility])    • Diabetes mellitus (CMS/[facility])    • GERD (gastroesophageal reflux disease)    • GI bleed    • Hiatal hernia    • Kidney stones    • Stroke (CMS/[facility])     2012     Past Surgical History:   Procedure Laterality Date   • CARDIAC CATHETERIZATION Left 3/11/2019    Procedure: Cardiac Catheterization/Vascular Study;  Surgeon: Markell Obrien MD;  Location: [facility] CATH INVASIVE LOCATION;  Service: Cardiology   • CARDIAC CATHETERIZATION  3/11/2019    Procedure: Functional Flow [place];  Surgeon: Markell Obrien MD;  Location:  [facility] CATH INVASIVE LOCATION;  Service: Cardiology   • CARDIAC CATHETERIZATION N/A 3/11/2019    Procedure: Left ventriculography;  Surgeon: Markell Obrien MD;  Location:  [facility] CATH INVASIVE LOCATION;  Service: Cardiology   • CORONARY ARTERY BYPASS GRAFT N/A 3/14/2019    Procedure: CABG X2 WITH LIMA AND RIGHT LEG OVH. PLACEMENT OF RIGHT FEMORAL ARTERIAL LINE;  Surgeon: Richard Orona MD;  Location: [facility] OR;  Service: Cardiothoracic         PT ASSESSMENT (last 12 hours)      Physical Therapy Evaluation     Row Name 03/15/19 0815          PT Evaluation Time/Intention    Subjective Information  complains of;pain  -DARREN     Document Type  re-evaluation  -DARREN     Mode of Treatment  physical therapy  -DARREN     Row Name 03/15/19 0815          General Information    Patient Profile Reviewed?  yes  -DARREN     Onset of Illness/Injury or Date of Surgery  03/11/19  -DARREN     Referring Physician  Dr Orona  -DARREN     Patient Observations  alert;cooperative;agree to therapy  -DARREN     Patient/Family Observations  no family present  -DARREN     General Observations of Patient  Fowlers in bed, alert, in obvious discomfort from bladder spasms  (Significant)   -DARREN     Prior Level of Function  independent:;all household mobility;ADL's;dressing;bathing  -DARREN     Equipment Currently Used at Home  none  -DARREN     Pertinent History of Current Functional Problem  SOB. Dx: ENNIS, CHF, severe left ventricular dysfunction, CAD. 3/11 cardiac cath. s/p 3/14 CABG x 2.   -DARREN     Existing Precautions/Restrictions  fall  -DARREN     Risks Reviewed  patient:;LOB;nausea/vomiting;dizziness;increased discomfort;change in vital signs;increased drainage;lines disloged  -DARREN     Benefits Reviewed  patient:;improve function;increase independence;increase strength;increase balance;decrease pain;increase knowledge;improve skin integrity  -DARREN     Barriers to Rehab  medically complex  -DARREN     Row Name 03/15/19 0815          Relationship/Environment    Lives With  spouse  -DARREN     Row Name 03/15/19 0815          Resource/Environmental Concerns    Current Living Arrangements  home/apartment/condo  -DARREN     Row Name 03/15/19 0815          Home Main Entrance    Number of Stairs, Main Entrance  three  -DARREN     Stair Railings, Main Entrance  none  -DARREN     Row Name 03/15/19 0815          Cognitive Assessment/Interventions    Additional Documentation  Cognitive Assessment/Intervention (Group)  -DARREN     Row Name 03/15/19 0815     "      Cognitive Assessment/Intervention- PT/OT    Orientation Status (Cognition)  oriented to;person;place;situation  -DARREN     Follows Commands (Cognition)  follows multi-step commands;75-90% accuracy;physical/tactile prompts required;repetition of directions required;verbal cues/prompting required  -DARREN     Safety Deficit (Cognitive)  mild deficit;safety precautions awareness  -DARREN     Personal Safety Interventions  fall prevention program maintained;muscle strengthening facilitated;nonskid shoes/slippers when out of bed  -DARREN     Row Name 03/15/19 0815          Safety Issues, Functional Mobility    Safety Issues Affecting Function (Mobility)  safety precaution awareness  -DARREN     Impairments Affecting Function (Mobility)  balance;endurance/activity tolerance;pain;strength  -DARREN     Row Name 03/15/19 0815          Bed Mobility Assessment/Treatment    Bed Mobility Assessment/Treatment  supine-sit  -DARREN     Supine-Sit Tripp (Bed Mobility)  moderate assist (50% patient effort);2 person assist  -DARREN     Bed Mobility, Safety Issues  decreased use of arms for pushing/pulling  -DARREN     Assistive Device (Bed Mobility)  head of bed elevated  -DARREN     Row Name 03/15/19 0815          Transfer Assessment/Treatment    Transfer Assessment/Treatment  sit-stand transfer;stand-sit transfer;bed-chair transfer  -DARREN     Comment (Transfers)  Cues for steps from EOB to bedside chair. B LE very \"shaky\" and weak. Cues also needed to weight shift in order to take lateral steps.   (Significant)   -DARREN     Bed-Chair Tripp (Transfers)  moderate assist (50% patient effort);2 person assist  -DARREN     Sit-Stand Tripp (Transfers)  moderate assist (50% patient effort);2 person assist  -DARREN     Stand-Sit Tripp (Transfers)  moderate assist (50% patient effort);2 person assist  -DARREN     Row Name 03/15/19 0815          General ROM    GENERAL ROM COMMENTS  B LE AROM WFL  -DARREN     Row Name 03/15/19 0815          MMT (Manual Muscle Testing) "    General MMT Comments  B LE functionally 3+/5  -DARREN     Row Name 03/15/19 0815          Motor Assessment/Intervention    Additional Documentation  Balance (Group)  -DARREN     Row Name 03/15/19 0815          Balance    Balance  static sitting balance;static standing balance  -DARREN     Row Name 03/15/19 0815          Static Sitting Balance    Level of [place] (Unsupported Sitting, Static Balance)  minimal assist, 75% patient effort;moderate assist, 50 to 74% patient effort  -DARREN     Sitting Position (Unsupported Sitting, Static Balance)  sitting on edge of bed  -DARREN     Time Able to Maintain Position (Unsupported Sitting, Static Balance)  more than 5 minutes  -DARREN     Row Name 03/15/19 0815          Static Standing Balance    Level of [place] (Supported Standing, Static Balance)  moderate assist, 50 to 74% patient effort;2 person assist  -DARREN     Row Name 03/15/19 0815          Pain Assessment    Additional Documentation  Pain Scale: Numbers Pre/Post-Treatment (Group)  -DARREN     Row Name 03/15/19 0815          Pain Scale: Numbers Pre/Post-Treatment    Pain Scale: Numbers, Pretreatment  7/10  -DARREN     Pain Location - Orientation  incisional  -DARREN     Pain Location  chest  (Significant)  bladder spasms  -DARREN     Pain Intervention(s)  Medication (See MAR);Repositioned;Ambulation/increased activity  -DARREN     Row Name 03/15/19 0815          Health Promotion    Additional Documentation  Coping (Group);Plan of Care Review (Group)  -DARREN     Row Name             Wound 03/14/19 0856 Other (See comments) chest incision    Wound - Properties Group Date first assessed: 03/14/19  -DS Time first assessed: 0856  -DS Side: Other (See comments)  -DS Location: chest  -DS Type: incision  -DS    Row Name             Wound 03/14/19 0856 Left leg incision    Wound - Properties Group Date first assessed: 03/14/19  -DS Time first assessed: 0856  -DS Side: Left  -DS Location: leg  -DS Type: incision  -DS    Row Name 03/15/19 0815          Coping     Observed Emotional State  restless  -DARREN     Row Name 03/15/19 0815          Plan of Care Review    Plan of Care Reviewed With  patient  -DARREN     Row Name 03/15/19 0815          Physical Therapy Clinical Impression    Date of Referral to PT  03/14/19  -DARREN     Patient/Family Goals Statement (PT Clinical Impression)  Go home, decrease pain  -DARREN     Criteria for Skilled Interventions Met (PT Clinical Impression)  yes;treatment indicated  -DARREN     Impairments Found (describe specific impairments)  gait, locomotion, and balance  -DARREN     Rehab Potential (PT Clinical Summary)  good, to achieve stated therapy goals  -DARREN     Predicted Duration of Therapy (PT)  until d/c  -DARREN     Care Plan Review (PT)  evaluation/treatment results reviewed;risks/benefits reviewed;current/potential barriers reviewed;patient/other agree to care plan  -DARREN     Row Name 03/15/19 0815          Vital Signs    Pre Systolic BP Rehab  110  -DARREN     Pre Treatment Diastolic BP  60  -DARREN     Post Systolic BP Rehab  135  -DARREN     Post Treatment Diastolic BP  74  -DARREN     Pretreatment Heart Rate (beats/min)  125  -DARREN     Intratreatment Heart Rate (beats/min)  140  -DARREN     Posttreatment Heart Rate (beats/min)  128  -DARREN     Pre SpO2 (%)  94  -DARREN     O2 Delivery Pre Treatment  supplemental O2  -DARREN     Post SpO2 (%)  92  -DARREN     O2 Delivery Post Treatment  supplemental O2  -DARREN     Pre Patient Position  Supine  -DARREN     Intra Patient Position  Standing  -DARREN     Post Patient Position  Sitting  -DARREN     Row Name 03/15/19 0815          Physical Therapy Goals    Bed Mobility Goal Selection (PT)  bed mobility, PT goal 1  -DARREN     Transfer Goal Selection (PT)  transfer, PT goal 1  -DARREN     Gait Training Goal Selection (PT)  gait training, PT goal 1  -DARREN     Stairs Goal Selection (PT)  stairs, PT goal 1  -DARREN     Additional Documentation  Stairs Goal Selection (PT) (Row)  -DARREN     Row Name 03/15/19 0815          Bed Mobility Goal 1 (PT)    Activity/Assistive Device (Bed Mobility  Goal 1, PT)  sit to supine;supine to sit  -DARREN     [place] Level/Cues Needed (Bed Mobility Goal 1, PT)  supervision required  -DARREN     Time Frame (Bed Mobility Goal 1, PT)  long term goal (LTG);10 days  -DARREN     Progress/Outcomes (Bed Mobility Goal 1, PT)  goal not met;goal ongoing  -DARREN     Row Name 03/15/19 0815          Transfer Goal 1 (PT)    Activity/Assistive Device (Transfer Goal 1, PT)  sit-to-stand/stand-to-sit;bed-to-chair/chair-to-bed  -DARREN     [place] Level/Cues Needed (Transfer Goal 1, PT)  supervision required  -DARREN     Time Frame (Transfer Goal 1, PT)  long term goal (LTG);10 days  -DARREN     Progress/Outcome (Transfer Goal 1, PT)  goal not met;goal ongoing  -DARREN     Row Name 03/15/19 0815          Gait Training Goal 1 (PT)    Activity/Assistive Device (Gait Training Goal 1, PT)  gait (walking locomotion);decrease fall risk;improve balance and speed;increase endurance/gait distance  -DARREN     [place] Level (Gait Training Goal 1, PT)  supervision required  -DARREN     Distance (Gait Goal 1, PT)  300  -DARREN     Time Frame (Gait Training Goal 1, PT)  long term goal (LTG);10 days  -DARREN     Progress/Outcome (Gait Training Goal 1, PT)  goal not met;goal ongoing  -DARREN     Row Name 03/15/19 0815          Stairs Goal 1 (PT)    Activity/Assistive Device (Stairs Goal 1, PT)  ascending stairs;descending stairs  -DARREN     [place] Level/Cues Needed (Stairs Goal 1, PT)  contact guard assist  -DARREN     Number of Stairs (Stairs Goal 1, PT)  3  -DARREN     Time Frame (Stairs Goal 1, PT)  long term goal (LTG);10 days  -DARREN     Progress/Outcome (Stairs Goal 1, PT)  goal not met;goal ongoing  -DARREN     Row Name 03/15/19 0815          Positioning and Restraints    Pre-Treatment Position  in bed  -DARREN     Post Treatment Position  chair  -DARREN     In Chair  notified nsg;sitting;call light within reach;encouraged to call for assist;patient within staff view  -DARREN     Row Name 03/15/19 0815          Living Environment    Home Accessibility   stairs to enter home  -DARREN       User Key  (r) = Recorded By, (t) = Taken By, (c) = Cosigned By    Initials Name Provider Type    Dinh Peace, PT DPT Physical Therapist    eBrt Blanchard, RN Registered Nurse        Physical Therapy Education     Title: PT OT SLP Therapies (In Progress)     Topic: Physical Therapy (In Progress)     Point: Mobility training (In Progress)     Learning Progress Summary           Patient Acceptance, E, NR by DARREN at 3/15/2019  8:15 AM    Comment:  Educated pt. on progression of PT POC, benefits of activity, sternal precautions.    Acceptance, E, VU,DU,NR by DARREN at 3/13/2019  2:30 PM    Comment:  Educated pt/spouse on progression of PT POC and benefits of activity. Answered pre questions about open heart surgery being performed tomorrow and educated on sternal precautions.   Significant Other Acceptance, E, VU,DU,NR by DARREN at 3/13/2019  2:30 PM    Comment:  Educated pt/spouse on progression of PT POC and benefits of activity. Answered pre questions about open heart surgery being performed tomorrow and educated on sternal precautions.                   Point: Precautions (In Progress)     Learning Progress Summary           Patient Acceptance, E, NR by DARREN at 3/15/2019  8:15 AM    Comment:  Educated pt. on progression of PT POC, benefits of activity, sternal precautions.                               User Key     Initials Effective Dates Name Provider Type Justin BANKS 08/02/16 -  Dinh Melgar, PT DPT Physical Therapist PT              PT Recommendation and Plan  Anticipated Discharge Disposition (PT): home with assist, home with home health, transitional care  Planned Therapy Interventions (PT Eval): bed mobility training, transfer training, gait training, balance training, home exercise program, patient/family education, postural re-education, stair training, strengthening  Therapy Frequency (PT Clinical Impression): 2 times/day  Outcome Summary/Treatment Plan  "(PT)  Anticipated Discharge Disposition (PT): home with assist, home with home health, transitional care  Plan of Care Reviewed With: patient  Outcome Summary: PT completed re-eval s/p open heart surgery yesterday. He is currently having continuous bladder spasms that is limiting his activity. He needed mod assist x 2 to barbie OOB, stand and to transfer from EOB to bedside chair. Cues for steps from EOB to bedside chair. B LE very \"shaky\" and weak. Cues also needed to weight shift in order to take lateral steps. PT will continue to progress his functional mobility, balance, and strength. I anticipate d/c home with assist and HH vs TCU for short term rehab, pending progress toward home safety and independence with ADL's.   Outcome Measures     Row Name 03/15/19 0815 03/13/19 1430          How much help from another person do you currently need...    Turning from your back to your side while in flat bed without using bedrails?  2  -DARREN  3  -DARREN     Moving from lying on back to sitting on the side of a flat bed without bedrails?  2  -DARREN  3  -DARREN     Moving to and from a bed to a chair (including a wheelchair)?  2  -DARREN  3  -DARREN     Standing up from a chair using your arms (e.g., wheelchair, bedside chair)?  2  -DARREN  3  -DARREN     Climbing 3-5 steps with a railing?  1  -DARREN  3  -DARREN     To walk in hospital room?  2  -DARREN  3  -DARREN     AM-PAC 6 Clicks Score  11  -DARREN  18  -DARREN        Functional Assessment    Outcome Measure Options  AM-PAC 6 Clicks Basic Mobility (PT)  -DARREN  AM-PAC 6 Clicks Basic Mobility (PT)  -DARREN       User Key  (r) = Recorded By, (t) = Taken By, (c) = Cosigned By    Initials Name Provider Type    Dinh Peace, PT DPT Physical Therapist         Time Calculation:   PT Charges     Row Name 03/15/19 1008             Time Calculation    Start Time  0815  -DARREN      Stop Time  0911  -DARREN      Time Calculation (min)  56 min  -DARREN      PT Received On  03/15/19  -DARREN      PT Goal Re-Cert Due Date  03/25/19  -DARREN         Time " Calculation- PT    Total Timed Code Minutes- PT  26 minute(s)  -DARREN         Timed Charges    20844 - PT Therapeutic Activity Minutes  26  -DARREN        User Key  (r) = Recorded By, (t) = Taken By, (c) = Cosigned By    Initials Name Provider Type    iDnh Peace, PT DPT Physical Therapist        Therapy Suggested Charges     Code   Minutes Charges    73114 (CPT®) Hc Pt Neuromusc Re Education Ea 15 Min      99296 (CPT®) Hc Pt Ther Proc Ea 15 Min      88728 (CPT®) Hc Gait Training Ea 15 Min      07104 (CPT®) Hc Pt Therapeutic Act Ea 15 Min 26 2    29425 (CPT®) Hc Pt Manual Therapy Ea 15 Min      49385 (CPT®) Hc Pt Iontophoresis Ea 15 Min      35520 (CPT®) Hc Pt Elec Stim Ea-Per 15 Min      71508 (CPT®) Hc Pt Ultrasound Ea 15 Min      41284 (CPT®) Hc Pt Self Care/Mgmt/Train Ea 15 Min      63456 (CPT®) Hc Pt Prosthetic (S) Train Initial Encounter, Each 15 Min      43791 (CPT®) Hc Pt Orthotic(S)/Prosthetic(S) Encounter, Each 15 Min      78858 (CPT®) Hc Orthotic(S) Mgmt/Train Initial Encounter, Each 15min      Total  26 2        Therapy Charges for Today     Code Description Service Date Service Provider Modifiers Qty    73021653476 HC PT THERAPEUTIC ACT EA 15 MIN 3/15/2019 Dinh Melgar, PT DPT GP 2    98708072814 HC PT RE-EVAL ESTABLISHED PLAN 2 3/15/2019 Dinh Melgar, PT DPT GP 1          PT G-Codes  Outcome Measure Options: AM-PAC 6 Clicks Basic Mobility (PT)  AM-PAC 6 Clicks Score: 11      Dinh Melgar, PT DPT  3/15/2019

## 2019-03-15 NOTE — PLAN OF CARE
"Problem: Patient Care Overview  Goal: Plan of Care Review  Outcome: Ongoing (interventions implemented as appropriate)   03/15/19 0894   Coping/Psychosocial   Plan of Care Reviewed With patient   OTHER   Outcome Summary PT completed re-eval s/p open heart surgery yesterday. He is currently having continuous bladder spasms that is limiting his activity. He needed mod assist x 2 to barbie OOB, stand and to transfer from EOB to bedside chair. Cues for steps from EOB to bedside chair. B LE very \"shaky\" and weak. Cues also needed to weight shift in order to take lateral steps. PT will continue to progress his functional mobility, balance, and strength. I anticipate d/c home with assist and HH vs TCU for short term rehab, pending progress toward home safety and independence with ADL's.          "

## 2019-03-16 ENCOUNTER — APPOINTMENT (OUTPATIENT)
Dept: GENERAL RADIOLOGY | Facility: HOSPITAL | Age: 74
End: 2019-03-16

## 2019-03-16 LAB
ANION GAP SERPL CALCULATED.3IONS-SCNC: 9 MMOL/L (ref 4–13)
BUN BLD-MCNC: 23 MG/DL (ref 5–21)
BUN/CREAT SERPL: 15.4 (ref 7–25)
CALCIUM SPEC-SCNC: 8.5 MG/DL (ref 8.4–10.4)
CHLORIDE SERPL-SCNC: 107 MMOL/L (ref 98–110)
CO2 SERPL-SCNC: 25 MMOL/L (ref 24–31)
CREAT BLD-MCNC: 1.49 MG/DL (ref 0.5–1.4)
DEPRECATED RDW RBC AUTO: 48.6 FL (ref 40–54)
ERYTHROCYTE [DISTWIDTH] IN BLOOD BY AUTOMATED COUNT: 15.6 % (ref 12–15)
GFR SERPL CREATININE-BSD FRML MDRD: 46 ML/MIN/1.73
GLUCOSE BLD-MCNC: 179 MG/DL (ref 70–100)
GLUCOSE BLDC GLUCOMTR-MCNC: 184 MG/DL (ref 70–130)
GLUCOSE BLDC GLUCOMTR-MCNC: 202 MG/DL (ref 70–130)
GLUCOSE BLDC GLUCOMTR-MCNC: 234 MG/DL (ref 70–130)
GLUCOSE BLDC GLUCOMTR-MCNC: 244 MG/DL (ref 70–130)
HCT VFR BLD AUTO: 29 % (ref 40–52)
HGB BLD-MCNC: 9.6 G/DL (ref 14–18)
LV EF 2D ECHO EST: 55 %
MCH RBC QN AUTO: 29 PG (ref 28–32)
MCHC RBC AUTO-ENTMCNC: 33.1 G/DL (ref 33–36)
MCV RBC AUTO: 87.6 FL (ref 82–95)
PLATELET # BLD AUTO: 109 10*3/MM3 (ref 130–400)
PMV BLD AUTO: 10.4 FL (ref 6–12)
POTASSIUM BLD-SCNC: 4.6 MMOL/L (ref 3.5–5.3)
RBC # BLD AUTO: 3.31 10*6/MM3 (ref 4.8–5.9)
SODIUM BLD-SCNC: 141 MMOL/L (ref 135–145)
WBC NRBC COR # BLD: 7.12 10*3/MM3 (ref 4.8–10.8)

## 2019-03-16 PROCEDURE — 25010000002 ENOXAPARIN PER 10 MG: Performed by: THORACIC SURGERY (CARDIOTHORACIC VASCULAR SURGERY)

## 2019-03-16 PROCEDURE — 97116 GAIT TRAINING THERAPY: CPT

## 2019-03-16 PROCEDURE — 93010 ELECTROCARDIOGRAM REPORT: CPT | Performed by: INTERNAL MEDICINE

## 2019-03-16 PROCEDURE — 82962 GLUCOSE BLOOD TEST: CPT

## 2019-03-16 PROCEDURE — 94799 UNLISTED PULMONARY SVC/PX: CPT

## 2019-03-16 PROCEDURE — 93005 ELECTROCARDIOGRAM TRACING: CPT | Performed by: THORACIC SURGERY (CARDIOTHORACIC VASCULAR SURGERY)

## 2019-03-16 PROCEDURE — 99024 POSTOP FOLLOW-UP VISIT: CPT | Performed by: THORACIC SURGERY (CARDIOTHORACIC VASCULAR SURGERY)

## 2019-03-16 PROCEDURE — 85027 COMPLETE CBC AUTOMATED: CPT | Performed by: THORACIC SURGERY (CARDIOTHORACIC VASCULAR SURGERY)

## 2019-03-16 PROCEDURE — 63710000001 INSULIN LISPRO (HUMAN) PER 5 UNITS: Performed by: THORACIC SURGERY (CARDIOTHORACIC VASCULAR SURGERY)

## 2019-03-16 PROCEDURE — 25010000002 FUROSEMIDE PER 20 MG: Performed by: THORACIC SURGERY (CARDIOTHORACIC VASCULAR SURGERY)

## 2019-03-16 PROCEDURE — 25010000002 VANCOMYCIN PER 500 MG: Performed by: THORACIC SURGERY (CARDIOTHORACIC VASCULAR SURGERY)

## 2019-03-16 PROCEDURE — 25010000002 CEFAZOLIN PER 500 MG: Performed by: THORACIC SURGERY (CARDIOTHORACIC VASCULAR SURGERY)

## 2019-03-16 PROCEDURE — 71045 X-RAY EXAM CHEST 1 VIEW: CPT

## 2019-03-16 PROCEDURE — 94760 N-INVAS EAR/PLS OXIMETRY 1: CPT

## 2019-03-16 PROCEDURE — 80048 BASIC METABOLIC PNL TOTAL CA: CPT | Performed by: THORACIC SURGERY (CARDIOTHORACIC VASCULAR SURGERY)

## 2019-03-16 RX ORDER — PANTOPRAZOLE SODIUM 40 MG/1
40 TABLET, DELAYED RELEASE ORAL
Status: DISCONTINUED | OUTPATIENT
Start: 2019-03-17 | End: 2019-03-22 | Stop reason: HOSPADM

## 2019-03-16 RX ORDER — GLIPIZIDE 5 MG/1
2.5 TABLET ORAL
Status: DISCONTINUED | OUTPATIENT
Start: 2019-03-16 | End: 2019-03-22 | Stop reason: HOSPADM

## 2019-03-16 RX ORDER — NICOTINE POLACRILEX 4 MG
15 LOZENGE BUCCAL
Status: DISCONTINUED | OUTPATIENT
Start: 2019-03-16 | End: 2019-03-22 | Stop reason: HOSPADM

## 2019-03-16 RX ORDER — RAMIPRIL 1.25 MG/1
1.25 CAPSULE ORAL
Status: DISCONTINUED | OUTPATIENT
Start: 2019-03-16 | End: 2019-03-19

## 2019-03-16 RX ORDER — FUROSEMIDE 10 MG/ML
20 INJECTION INTRAMUSCULAR; INTRAVENOUS
Status: DISCONTINUED | OUTPATIENT
Start: 2019-03-16 | End: 2019-03-16

## 2019-03-16 RX ORDER — SODIUM CHLORIDE 9 MG/ML
75 INJECTION, SOLUTION INTRAVENOUS CONTINUOUS
Status: DISCONTINUED | OUTPATIENT
Start: 2019-03-16 | End: 2019-03-18

## 2019-03-16 RX ORDER — DEXTROSE MONOHYDRATE 25 G/50ML
25 INJECTION, SOLUTION INTRAVENOUS
Status: DISCONTINUED | OUTPATIENT
Start: 2019-03-16 | End: 2019-03-22 | Stop reason: HOSPADM

## 2019-03-16 RX ORDER — AMIODARONE HYDROCHLORIDE 200 MG/1
400 TABLET ORAL EVERY 8 HOURS
Status: DISCONTINUED | OUTPATIENT
Start: 2019-03-16 | End: 2019-03-18

## 2019-03-16 RX ORDER — POTASSIUM CHLORIDE 750 MG/1
20 CAPSULE, EXTENDED RELEASE ORAL 2 TIMES DAILY WITH MEALS
Status: DISCONTINUED | OUTPATIENT
Start: 2019-03-16 | End: 2019-03-22 | Stop reason: HOSPADM

## 2019-03-16 RX ADMIN — AMIODARONE HYDROCHLORIDE 400 MG: 200 TABLET ORAL at 13:15

## 2019-03-16 RX ADMIN — INSULIN LISPRO 4 UNITS: 100 INJECTION, SOLUTION INTRAVENOUS; SUBCUTANEOUS at 10:05

## 2019-03-16 RX ADMIN — OXYCODONE HYDROCHLORIDE AND ACETAMINOPHEN 1 TABLET: 10; 325 TABLET ORAL at 01:07

## 2019-03-16 RX ADMIN — IPRATROPIUM BROMIDE AND ALBUTEROL SULFATE 3 ML: 2.5; .5 SOLUTION RESPIRATORY (INHALATION) at 14:27

## 2019-03-16 RX ADMIN — IPRATROPIUM BROMIDE AND ALBUTEROL SULFATE 3 ML: 2.5; .5 SOLUTION RESPIRATORY (INHALATION) at 06:40

## 2019-03-16 RX ADMIN — IPRATROPIUM BROMIDE AND ALBUTEROL SULFATE 3 ML: 2.5; .5 SOLUTION RESPIRATORY (INHALATION) at 11:12

## 2019-03-16 RX ADMIN — METOPROLOL TARTRATE 25 MG: 25 TABLET, FILM COATED ORAL at 23:29

## 2019-03-16 RX ADMIN — SODIUM CHLORIDE 125 ML/HR: 9 INJECTION, SOLUTION INTRAVENOUS at 18:39

## 2019-03-16 RX ADMIN — ATORVASTATIN CALCIUM 20 MG: 10 TABLET, FILM COATED ORAL at 20:33

## 2019-03-16 RX ADMIN — FUROSEMIDE 20 MG: 10 INJECTION, SOLUTION INTRAVENOUS at 13:13

## 2019-03-16 RX ADMIN — CLOPIDOGREL 75 MG: 75 TABLET, FILM COATED ORAL at 17:46

## 2019-03-16 RX ADMIN — BISACODYL 10 MG: 5 TABLET ORAL at 08:46

## 2019-03-16 RX ADMIN — RAMIPRIL 1.25 MG: 1.25 CAPSULE ORAL at 15:24

## 2019-03-16 RX ADMIN — CHLORHEXIDINE GLUCONATE 15 ML: 1.2 RINSE ORAL at 10:04

## 2019-03-16 RX ADMIN — VANCOMYCIN HYDROCHLORIDE 1000 MG: 1 INJECTION, SOLUTION INTRAVENOUS at 06:12

## 2019-03-16 RX ADMIN — POTASSIUM CHLORIDE 20 MEQ: 750 CAPSULE, EXTENDED RELEASE ORAL at 17:46

## 2019-03-16 RX ADMIN — METOPROLOL TARTRATE 12.5 MG: 25 TABLET, FILM COATED ORAL at 08:46

## 2019-03-16 RX ADMIN — FUROSEMIDE 20 MG: 10 INJECTION, SOLUTION INTRAVENOUS at 17:46

## 2019-03-16 RX ADMIN — OXYCODONE AND ACETAMINOPHEN 1 TABLET: 5; 325 TABLET ORAL at 08:46

## 2019-03-16 RX ADMIN — AMIODARONE HYDROCHLORIDE 400 MG: 200 TABLET ORAL at 05:17

## 2019-03-16 RX ADMIN — INSULIN LISPRO 4 UNITS: 100 INJECTION, SOLUTION INTRAVENOUS; SUBCUTANEOUS at 13:16

## 2019-03-16 RX ADMIN — IPRATROPIUM BROMIDE AND ALBUTEROL SULFATE 3 ML: 2.5; .5 SOLUTION RESPIRATORY (INHALATION) at 20:56

## 2019-03-16 RX ADMIN — AMIODARONE HYDROCHLORIDE 400 MG: 200 TABLET ORAL at 23:29

## 2019-03-16 RX ADMIN — BISACODYL 10 MG: 5 TABLET ORAL at 20:33

## 2019-03-16 RX ADMIN — ENOXAPARIN SODIUM 30 MG: 30 INJECTION SUBCUTANEOUS at 08:46

## 2019-03-16 RX ADMIN — CHLORHEXIDINE GLUCONATE 15 ML: 1.2 RINSE ORAL at 20:33

## 2019-03-16 RX ADMIN — INSULIN LISPRO 2 UNITS: 100 INJECTION, SOLUTION INTRAVENOUS; SUBCUTANEOUS at 23:31

## 2019-03-16 RX ADMIN — MUPIROCIN: 20 OINTMENT TOPICAL at 17:47

## 2019-03-16 RX ADMIN — ASPIRIN 81 MG: 81 TABLET, DELAYED RELEASE ORAL at 08:46

## 2019-03-16 RX ADMIN — MUPIROCIN 10 APPLICATION: 20 OINTMENT TOPICAL at 05:19

## 2019-03-16 RX ADMIN — OXYCODONE AND ACETAMINOPHEN 1 TABLET: 5; 325 TABLET ORAL at 13:13

## 2019-03-16 RX ADMIN — INSULIN LISPRO 4 UNITS: 100 INJECTION, SOLUTION INTRAVENOUS; SUBCUTANEOUS at 17:46

## 2019-03-16 RX ADMIN — SODIUM CHLORIDE 2 G: 9 INJECTION, SOLUTION INTRAVENOUS at 13:14

## 2019-03-16 RX ADMIN — SODIUM CHLORIDE 2 G: 9 INJECTION, SOLUTION INTRAVENOUS at 04:08

## 2019-03-16 RX ADMIN — OXYCODONE HYDROCHLORIDE AND ACETAMINOPHEN 1 TABLET: 10; 325 TABLET ORAL at 23:30

## 2019-03-16 RX ADMIN — ENOXAPARIN SODIUM 30 MG: 30 INJECTION SUBCUTANEOUS at 20:34

## 2019-03-16 RX ADMIN — GLIPIZIDE 2.5 MG: 5 TABLET ORAL at 17:47

## 2019-03-16 NOTE — PLAN OF CARE
Problem: Patient Care Overview  Goal: Plan of Care Review  Outcome: Ongoing (interventions implemented as appropriate)   03/16/19 2405   Coping/Psychosocial   Plan of Care Reviewed With patient   OTHER   Outcome Summary Again with difficulty getting balance. Heavy posterior R lean. L knee straight, barely advancing R LE. Managed 60' beginning as a mod of 2 assist progressing to a Max of 2. Will continueto benefit from safet training working on mobility and hopefully able to advance ambulation!!!!

## 2019-03-16 NOTE — PLAN OF CARE
Problem: Patient Care Overview  Goal: Plan of Care Review  Outcome: Ongoing (interventions implemented as appropriate)   03/16/19 4546   Coping/Psychosocial   Plan of Care Reviewed With patient   OTHER   Outcome Summary Pt. with difficulty standing and walking this a.m. Very anxious. Difficulty advancing R leg and shifting weight to L. Was able to walk approx 60' with mod to max assistance. Constant help to shift weight. Will continue to benefit from ambulation.

## 2019-03-16 NOTE — PROGRESS NOTES
"CABG x 2, Right leg open vein harvest, placement of right femoral arterial line    POD 2      Difficulty standing walking this a.m.  Was able to walk approximately 60 feet with moderate-max assistance.  He continues to require 4 L nasal cannula.  Urinary incontinence is noted with diuretics.  Telemetry: -110s    Visit Vitals  /74   Pulse 102 Comment: PPOST TX   Temp 98 °F (36.7 °C) (Oral)   Resp 16   Ht 172.7 cm (68\")   Wt 89 kg (196 lb 4.8 oz)   SpO2 97%   BMI 29.85 kg/m²   Baseline weight: 194      Intake/Output Summary (Last 24 hours) at 3/16/2019 1242  Last data filed at 3/16/2019 1008  Gross per 24 hour   Intake 2182.6 ml   Output 325 ml   Net 1857.6 ml     MST output: 35 ml  L Caden drain: 116 ml    Labs:        Chest x ray: right linear atelectasis, bibasilar atelectasis, no pneumothorax,  Poor inspiration    Physical Exam:  General: No apparent distress. Up in the chair.   Cardiovascular: Tachycardiac with regular rhythm without murmur, rubs, or gallops.    Pulmonary: Clear to auscultation bilaterally without wheezing, rubs, or rales.  Chest: Sternotomy incision clean, dry, and intact. Sternum stable. No clicks. Mediastinal tubes x 2 and caden drain x 1 with dressing clean, dry, and intact.    Abdomen: Soft, non-distended, and non-tender.  Extremities: Warm, moves all extremities. Saphenectomy site clean, dry, and intact Neurologic: Grossly intact with no focal deficits. Moans at times.      Impression:  Coronary artery disease status post CABG   Left ventricular dysfuction  Type 2 diabetes mellitus  Hypertension  History of stroke  Restrictive lung disease  Hiatal hernia, large  Physical debility    Plan:  ivf at this time. Stop diuretics.    Encourage increased oral intake.    Encourage pulmonary toilet and ambulation, encourage pain regimen  Routine post cardiac surgery regimen  Keep in unit, andrew RODRÍGUEZ patient plan of care.    "

## 2019-03-16 NOTE — PLAN OF CARE
Problem: Patient Care Overview  Goal: Plan of Care Review  Outcome: Ongoing (interventions implemented as appropriate)   03/16/19 4608   Coping/Psychosocial   Plan of Care Reviewed With spouse;patient   Plan of Care Review   Progress no change   OTHER   Outcome Summary Pt ambulated x 1 this shift to ICU doors and back to room. Lasix given x 1 with urinary incontinence following. Pt moans and grimaces constantly but not always indicative of pain. Encouraged to take pain meds when needed to facilitate coughing and moving needed to improve from surgery. 4l nasal cannula o2. Tachypneic al night. Tachycardia low 100s to 110s.        Problem: Fall Risk (Adult)  Goal: Absence of Fall  Outcome: Ongoing (interventions implemented as appropriate)      Problem: Skin Injury Risk (Adult)  Goal: Identify Related Risk Factors and Signs and Symptoms  Outcome: Ongoing (interventions implemented as appropriate)    Goal: Skin Health and Integrity  Outcome: Ongoing (interventions implemented as appropriate)      Problem: Cardiac Surgery (Adult)  Goal: Signs and Symptoms of Listed Potential Problems Will be Absent, Minimized or Managed (Cardiac Surgery)  Outcome: Ongoing (interventions implemented as appropriate)    Goal: Anesthesia/Sedation Recovery  Outcome: Outcome(s) achieved Date Met: 03/16/19

## 2019-03-17 ENCOUNTER — APPOINTMENT (OUTPATIENT)
Dept: GENERAL RADIOLOGY | Facility: HOSPITAL | Age: 74
End: 2019-03-17

## 2019-03-17 LAB
ABO + RH BLD: NORMAL
ABO + RH BLD: NORMAL
ANION GAP SERPL CALCULATED.3IONS-SCNC: 11 MMOL/L (ref 4–13)
BH BB BLOOD EXPIRATION DATE: NORMAL
BH BB BLOOD EXPIRATION DATE: NORMAL
BH BB BLOOD TYPE BARCODE: 9500
BH BB BLOOD TYPE BARCODE: 9500
BH BB DISPENSE STATUS: NORMAL
BH BB DISPENSE STATUS: NORMAL
BH BB PRODUCT CODE: NORMAL
BH BB PRODUCT CODE: NORMAL
BH BB UNIT NUMBER: NORMAL
BH BB UNIT NUMBER: NORMAL
BUN BLD-MCNC: 29 MG/DL (ref 5–21)
BUN/CREAT SERPL: 22.3 (ref 7–25)
CALCIUM SPEC-SCNC: 8.3 MG/DL (ref 8.4–10.4)
CHLORIDE SERPL-SCNC: 110 MMOL/L (ref 98–110)
CO2 SERPL-SCNC: 22 MMOL/L (ref 24–31)
CREAT BLD-MCNC: 1.3 MG/DL (ref 0.5–1.4)
CROSSMATCH INTERPRETATION: NORMAL
CROSSMATCH INTERPRETATION: NORMAL
DEPRECATED RDW RBC AUTO: 49.6 FL (ref 40–54)
ERYTHROCYTE [DISTWIDTH] IN BLOOD BY AUTOMATED COUNT: 15.5 % (ref 12–15)
GFR SERPL CREATININE-BSD FRML MDRD: 54 ML/MIN/1.73
GLUCOSE BLD-MCNC: 148 MG/DL (ref 70–100)
GLUCOSE BLDC GLUCOMTR-MCNC: 182 MG/DL (ref 70–130)
GLUCOSE BLDC GLUCOMTR-MCNC: 199 MG/DL (ref 70–130)
GLUCOSE BLDC GLUCOMTR-MCNC: 212 MG/DL (ref 70–130)
HCT VFR BLD AUTO: 28.1 % (ref 40–52)
HGB BLD-MCNC: 9.3 G/DL (ref 14–18)
MCH RBC QN AUTO: 29.3 PG (ref 28–32)
MCHC RBC AUTO-ENTMCNC: 33.1 G/DL (ref 33–36)
MCV RBC AUTO: 88.6 FL (ref 82–95)
PLATELET # BLD AUTO: 110 10*3/MM3 (ref 130–400)
PMV BLD AUTO: 10.4 FL (ref 6–12)
POTASSIUM BLD-SCNC: 4.3 MMOL/L (ref 3.5–5.3)
RBC # BLD AUTO: 3.17 10*6/MM3 (ref 4.8–5.9)
SODIUM BLD-SCNC: 143 MMOL/L (ref 135–145)
UNIT  ABO: NORMAL
UNIT  ABO: NORMAL
UNIT  RH: NORMAL
UNIT  RH: NORMAL
WBC NRBC COR # BLD: 5.26 10*3/MM3 (ref 4.8–10.8)

## 2019-03-17 PROCEDURE — 25010000002 ENOXAPARIN PER 10 MG: Performed by: THORACIC SURGERY (CARDIOTHORACIC VASCULAR SURGERY)

## 2019-03-17 PROCEDURE — 97116 GAIT TRAINING THERAPY: CPT

## 2019-03-17 PROCEDURE — 99024 POSTOP FOLLOW-UP VISIT: CPT | Performed by: THORACIC SURGERY (CARDIOTHORACIC VASCULAR SURGERY)

## 2019-03-17 PROCEDURE — 71045 X-RAY EXAM CHEST 1 VIEW: CPT

## 2019-03-17 PROCEDURE — 80048 BASIC METABOLIC PNL TOTAL CA: CPT | Performed by: THORACIC SURGERY (CARDIOTHORACIC VASCULAR SURGERY)

## 2019-03-17 PROCEDURE — 63710000001 INSULIN LISPRO (HUMAN) PER 5 UNITS: Performed by: THORACIC SURGERY (CARDIOTHORACIC VASCULAR SURGERY)

## 2019-03-17 PROCEDURE — 94760 N-INVAS EAR/PLS OXIMETRY 1: CPT

## 2019-03-17 PROCEDURE — 82962 GLUCOSE BLOOD TEST: CPT

## 2019-03-17 PROCEDURE — 94799 UNLISTED PULMONARY SVC/PX: CPT

## 2019-03-17 PROCEDURE — 85027 COMPLETE CBC AUTOMATED: CPT | Performed by: THORACIC SURGERY (CARDIOTHORACIC VASCULAR SURGERY)

## 2019-03-17 RX ORDER — LIDOCAINE 50 MG/G
2 PATCH TOPICAL
Status: DISCONTINUED | OUTPATIENT
Start: 2019-03-17 | End: 2019-03-22 | Stop reason: HOSPADM

## 2019-03-17 RX ADMIN — INSULIN LISPRO 2 UNITS: 100 INJECTION, SOLUTION INTRAVENOUS; SUBCUTANEOUS at 17:41

## 2019-03-17 RX ADMIN — IPRATROPIUM BROMIDE AND ALBUTEROL SULFATE 3 ML: 2.5; .5 SOLUTION RESPIRATORY (INHALATION) at 06:45

## 2019-03-17 RX ADMIN — IPRATROPIUM BROMIDE AND ALBUTEROL SULFATE 3 ML: 2.5; .5 SOLUTION RESPIRATORY (INHALATION) at 19:02

## 2019-03-17 RX ADMIN — GLIPIZIDE 2.5 MG: 5 TABLET ORAL at 17:41

## 2019-03-17 RX ADMIN — GLIPIZIDE 2.5 MG: 5 TABLET ORAL at 08:19

## 2019-03-17 RX ADMIN — ASPIRIN 81 MG: 81 TABLET, DELAYED RELEASE ORAL at 08:18

## 2019-03-17 RX ADMIN — POTASSIUM CHLORIDE 20 MEQ: 750 CAPSULE, EXTENDED RELEASE ORAL at 08:19

## 2019-03-17 RX ADMIN — LIDOCAINE 2 PATCH: 50 PATCH CUTANEOUS at 06:49

## 2019-03-17 RX ADMIN — RAMIPRIL 1.25 MG: 1.25 CAPSULE ORAL at 08:18

## 2019-03-17 RX ADMIN — AMIODARONE HYDROCHLORIDE 400 MG: 200 TABLET ORAL at 21:33

## 2019-03-17 RX ADMIN — PANTOPRAZOLE SODIUM 40 MG: 40 TABLET, DELAYED RELEASE ORAL at 08:18

## 2019-03-17 RX ADMIN — METOPROLOL TARTRATE 25 MG: 25 TABLET, FILM COATED ORAL at 08:19

## 2019-03-17 RX ADMIN — SODIUM CHLORIDE 75 ML/HR: 9 INJECTION, SOLUTION INTRAVENOUS at 12:28

## 2019-03-17 RX ADMIN — CHLORHEXIDINE GLUCONATE 15 ML: 1.2 RINSE ORAL at 08:19

## 2019-03-17 RX ADMIN — POTASSIUM CHLORIDE 20 MEQ: 750 CAPSULE, EXTENDED RELEASE ORAL at 17:41

## 2019-03-17 RX ADMIN — ENOXAPARIN SODIUM 30 MG: 30 INJECTION SUBCUTANEOUS at 21:34

## 2019-03-17 RX ADMIN — AMIODARONE HYDROCHLORIDE 400 MG: 200 TABLET ORAL at 03:46

## 2019-03-17 RX ADMIN — INSULIN LISPRO 4 UNITS: 100 INJECTION, SOLUTION INTRAVENOUS; SUBCUTANEOUS at 08:20

## 2019-03-17 RX ADMIN — AMIODARONE HYDROCHLORIDE 400 MG: 200 TABLET ORAL at 11:53

## 2019-03-17 RX ADMIN — OXYCODONE HYDROCHLORIDE AND ACETAMINOPHEN 1 TABLET: 10; 325 TABLET ORAL at 03:43

## 2019-03-17 RX ADMIN — CHLORHEXIDINE GLUCONATE 15 ML: 1.2 RINSE ORAL at 21:34

## 2019-03-17 RX ADMIN — METOPROLOL TARTRATE 25 MG: 25 TABLET, FILM COATED ORAL at 21:33

## 2019-03-17 RX ADMIN — IPRATROPIUM BROMIDE AND ALBUTEROL SULFATE 3 ML: 2.5; .5 SOLUTION RESPIRATORY (INHALATION) at 11:01

## 2019-03-17 RX ADMIN — CLOPIDOGREL 75 MG: 75 TABLET, FILM COATED ORAL at 17:41

## 2019-03-17 RX ADMIN — OXYCODONE AND ACETAMINOPHEN 1 TABLET: 5; 325 TABLET ORAL at 15:38

## 2019-03-17 RX ADMIN — ATORVASTATIN CALCIUM 20 MG: 10 TABLET, FILM COATED ORAL at 21:34

## 2019-03-17 RX ADMIN — BISACODYL 10 MG: 5 TABLET ORAL at 08:18

## 2019-03-17 RX ADMIN — IPRATROPIUM BROMIDE AND ALBUTEROL SULFATE 3 ML: 2.5; .5 SOLUTION RESPIRATORY (INHALATION) at 14:50

## 2019-03-17 RX ADMIN — BISACODYL 10 MG: 5 TABLET ORAL at 21:34

## 2019-03-17 RX ADMIN — OXYCODONE AND ACETAMINOPHEN 1 TABLET: 5; 325 TABLET ORAL at 20:15

## 2019-03-17 RX ADMIN — INSULIN LISPRO 2 UNITS: 100 INJECTION, SOLUTION INTRAVENOUS; SUBCUTANEOUS at 21:34

## 2019-03-17 RX ADMIN — ENOXAPARIN SODIUM 30 MG: 30 INJECTION SUBCUTANEOUS at 08:18

## 2019-03-17 RX ADMIN — SODIUM CHLORIDE 75 ML/HR: 9 INJECTION, SOLUTION INTRAVENOUS at 21:34

## 2019-03-17 NOTE — PLAN OF CARE
Problem: Patient Care Overview  Goal: Plan of Care Review  Outcome: Ongoing (interventions implemented as appropriate)   03/17/19 1327   Coping/Psychosocial   Plan of Care Reviewed With patient   OTHER   Outcome Summary Pt. required mod assist x2 to stand. Began with min assist x2 for 2/3rdof walk then posterior R lean kicked in with decreased R LE step. But....was able to walk 110'. Pt does exhibit anxiety. Will continue to benefit from safety education and increased ambulation as toerates.      03/17/19 1327   Coping/Psychosocial   Plan of Care Reviewed With patient   OTHER   Outcome Summary Pt. required mod assist x2 to stand. Began with min assist x2 for 2/3rdof walk then posterior R lean kicked in with decreased R LE step. But....was able to walk 110'. Pt does exhibit anxiety. Will continue to benefit from safety education and increased ambulation as toerates.

## 2019-03-17 NOTE — PROGRESS NOTES
"CABG x 2, Right leg open vein harvest, placement of right femoral arterial line    POD 3    Working with physical therapy which challenges secondary to baseline impairment and physical deconditioning.  No events overnight.  Afebrile overnight.    Visit Vitals  /64   Pulse 86   Temp 98.4 °F (36.9 °C)   Resp 20   Ht 172.7 cm (68\")   Wt 89 kg (196 lb 4.8 oz)   SpO2 96%   BMI 29.85 kg/m²   Baseline weight: 194  4 L/min nasal cannula.    Intake/Output Summary (Last 24 hours) at 3/17/2019 0929  Last data filed at 3/17/2019 0300  Gross per 24 hour   Intake 769 ml   Output 420 ml   Net 349 ml       Labs:        Chest x ray: right linear atelectasis, bibasilar atelectasis, no pneumothorax,  Poor inspiration, worsening aeration.  Left greater than right.    Physical Exam:  General: No apparent distress. Up in the chair.   Cardiovascular: regular rate and rhythm without murmur, rubs, or gallops.    Pulmonary: Clear to auscultation bilaterally without wheezing, rubs, or rales.  Chest: Sternotomy incision clean, dry, and intact. Sternum stable. No clicks.   Abdomen: Soft, non-distended, and non-tender.  Extremities: Warm, moves all extremities. Saphenectomy site clean, dry, and intact Neurologic: Grossly intact with no focal deficits.    Impression:  Coronary artery disease status post CABG   Left ventricular dysfuction  Type 2 diabetes mellitus  Hypertension  History of stroke  Restrictive lung disease  Hiatal hernia, large  Physical debility    Plan:  ivf rate decreased.      Encourage increased oral intake.    Encourage pulmonary toilet and ambulation, encourage pain regimen  Routine post cardiac surgery regimen  Keep in unit   DW patient plan of care.    "

## 2019-03-18 ENCOUNTER — APPOINTMENT (OUTPATIENT)
Dept: GENERAL RADIOLOGY | Facility: HOSPITAL | Age: 74
End: 2019-03-18

## 2019-03-18 LAB
ANION GAP SERPL CALCULATED.3IONS-SCNC: 9 MMOL/L (ref 4–13)
ARTERIAL PATENCY WRIST A: POSITIVE
ATMOSPHERIC PRESS: 761 MMHG
BASE EXCESS BLDA CALC-SCNC: -3.5 MMOL/L (ref 0–2)
BASOPHILS # BLD AUTO: 0.02 10*3/MM3 (ref 0–0.2)
BASOPHILS NFR BLD AUTO: 0.3 % (ref 0–2)
BDY SITE: ABNORMAL
BODY TEMPERATURE: 37 C
BUN BLD-MCNC: 32 MG/DL (ref 5–21)
BUN/CREAT SERPL: 27.4 (ref 7–25)
CALCIUM SPEC-SCNC: 8.5 MG/DL (ref 8.4–10.4)
CHLORIDE SERPL-SCNC: 112 MMOL/L (ref 98–110)
CO2 SERPL-SCNC: 23 MMOL/L (ref 24–31)
CREAT BLD-MCNC: 1.17 MG/DL (ref 0.5–1.4)
DEPRECATED RDW RBC AUTO: 50.4 FL (ref 40–54)
EOSINOPHIL # BLD AUTO: 0.23 10*3/MM3 (ref 0–0.7)
EOSINOPHIL NFR BLD AUTO: 3.8 % (ref 0–4)
ERYTHROCYTE [DISTWIDTH] IN BLOOD BY AUTOMATED COUNT: 15.5 % (ref 12–15)
GAS FLOW AIRWAY: 2 LPM
GFR SERPL CREATININE-BSD FRML MDRD: 61 ML/MIN/1.73
GLUCOSE BLD-MCNC: 192 MG/DL (ref 70–100)
GLUCOSE BLDC GLUCOMTR-MCNC: 148 MG/DL (ref 70–130)
GLUCOSE BLDC GLUCOMTR-MCNC: 166 MG/DL (ref 70–130)
GLUCOSE BLDC GLUCOMTR-MCNC: 172 MG/DL (ref 70–130)
GLUCOSE BLDC GLUCOMTR-MCNC: 189 MG/DL (ref 70–130)
HCO3 BLDA-SCNC: 21.5 MMOL/L (ref 20–26)
HCT VFR BLD AUTO: 30.2 % (ref 40–52)
HGB BLD-MCNC: 9.8 G/DL (ref 14–18)
IMM GRANULOCYTES # BLD AUTO: 0.02 10*3/MM3 (ref 0–0.05)
IMM GRANULOCYTES NFR BLD AUTO: 0.3 % (ref 0–5)
LYMPHOCYTES # BLD AUTO: 0.45 10*3/MM3 (ref 0.72–4.86)
LYMPHOCYTES NFR BLD AUTO: 7.5 % (ref 15–45)
Lab: ABNORMAL
MCH RBC QN AUTO: 29.4 PG (ref 28–32)
MCHC RBC AUTO-ENTMCNC: 32.5 G/DL (ref 33–36)
MCV RBC AUTO: 90.7 FL (ref 82–95)
MODALITY: ABNORMAL
MONOCYTES # BLD AUTO: 0.36 10*3/MM3 (ref 0.19–1.3)
MONOCYTES NFR BLD AUTO: 6 % (ref 4–12)
NEUTROPHILS # BLD AUTO: 4.9 10*3/MM3 (ref 1.87–8.4)
NEUTROPHILS NFR BLD AUTO: 82.1 % (ref 39–78)
NRBC BLD AUTO-RTO: 0 /100 WBC (ref 0–0)
PCO2 BLDA: 37.8 MM HG (ref 35–45)
PH BLDA: 7.36 PH UNITS (ref 7.35–7.45)
PLATELET # BLD AUTO: 146 10*3/MM3 (ref 130–400)
PMV BLD AUTO: 10.3 FL (ref 6–12)
PO2 BLDA: 92 MM HG (ref 83–108)
POTASSIUM BLD-SCNC: 4.9 MMOL/L (ref 3.5–5.3)
RBC # BLD AUTO: 3.33 10*6/MM3 (ref 4.8–5.9)
SAO2 % BLDCOA: 97.4 % (ref 94–99)
SODIUM BLD-SCNC: 144 MMOL/L (ref 135–145)
VENTILATOR MODE: ABNORMAL
WBC NRBC COR # BLD: 5.98 10*3/MM3 (ref 4.8–10.8)

## 2019-03-18 PROCEDURE — 82803 BLOOD GASES ANY COMBINATION: CPT

## 2019-03-18 PROCEDURE — 99024 POSTOP FOLLOW-UP VISIT: CPT | Performed by: NURSE PRACTITIONER

## 2019-03-18 PROCEDURE — 71046 X-RAY EXAM CHEST 2 VIEWS: CPT

## 2019-03-18 PROCEDURE — 80048 BASIC METABOLIC PNL TOTAL CA: CPT | Performed by: THORACIC SURGERY (CARDIOTHORACIC VASCULAR SURGERY)

## 2019-03-18 PROCEDURE — 25010000002 ENOXAPARIN PER 10 MG: Performed by: THORACIC SURGERY (CARDIOTHORACIC VASCULAR SURGERY)

## 2019-03-18 PROCEDURE — 94799 UNLISTED PULMONARY SVC/PX: CPT

## 2019-03-18 PROCEDURE — 36600 WITHDRAWAL OF ARTERIAL BLOOD: CPT

## 2019-03-18 PROCEDURE — 97110 THERAPEUTIC EXERCISES: CPT

## 2019-03-18 PROCEDURE — 85025 COMPLETE CBC W/AUTO DIFF WBC: CPT | Performed by: THORACIC SURGERY (CARDIOTHORACIC VASCULAR SURGERY)

## 2019-03-18 PROCEDURE — 97116 GAIT TRAINING THERAPY: CPT

## 2019-03-18 PROCEDURE — 63710000001 INSULIN LISPRO (HUMAN) PER 5 UNITS: Performed by: THORACIC SURGERY (CARDIOTHORACIC VASCULAR SURGERY)

## 2019-03-18 PROCEDURE — 82962 GLUCOSE BLOOD TEST: CPT

## 2019-03-18 RX ORDER — ESCITALOPRAM OXALATE 10 MG/1
5 TABLET ORAL NIGHTLY
Status: DISCONTINUED | OUTPATIENT
Start: 2019-03-18 | End: 2019-03-21

## 2019-03-18 RX ORDER — AMIODARONE HYDROCHLORIDE 200 MG/1
400 TABLET ORAL EVERY 12 HOURS SCHEDULED
Status: DISCONTINUED | OUTPATIENT
Start: 2019-03-18 | End: 2019-03-19

## 2019-03-18 RX ORDER — POLYETHYLENE GLYCOL 3350 17 G/17G
17 POWDER, FOR SOLUTION ORAL DAILY
Status: DISCONTINUED | OUTPATIENT
Start: 2019-03-18 | End: 2019-03-22 | Stop reason: HOSPADM

## 2019-03-18 RX ORDER — OXYCODONE HYDROCHLORIDE AND ACETAMINOPHEN 5; 325 MG/1; MG/1
1 TABLET ORAL EVERY 6 HOURS PRN
Status: DISCONTINUED | OUTPATIENT
Start: 2019-03-18 | End: 2019-03-22 | Stop reason: HOSPADM

## 2019-03-18 RX ADMIN — PANTOPRAZOLE SODIUM 40 MG: 40 TABLET, DELAYED RELEASE ORAL at 05:30

## 2019-03-18 RX ADMIN — OXYCODONE HYDROCHLORIDE AND ACETAMINOPHEN 1 TABLET: 10; 325 TABLET ORAL at 03:15

## 2019-03-18 RX ADMIN — OXYCODONE HYDROCHLORIDE AND ACETAMINOPHEN 1 TABLET: 10; 325 TABLET ORAL at 08:24

## 2019-03-18 RX ADMIN — BISACODYL 10 MG: 5 TABLET ORAL at 08:24

## 2019-03-18 RX ADMIN — IPRATROPIUM BROMIDE AND ALBUTEROL SULFATE 3 ML: 2.5; .5 SOLUTION RESPIRATORY (INHALATION) at 21:19

## 2019-03-18 RX ADMIN — CLOPIDOGREL 75 MG: 75 TABLET, FILM COATED ORAL at 17:46

## 2019-03-18 RX ADMIN — ESCITSLOPRAM 5 MG: 10 TABLET ORAL at 20:15

## 2019-03-18 RX ADMIN — OXYCODONE AND ACETAMINOPHEN 1 TABLET: 5; 325 TABLET ORAL at 16:24

## 2019-03-18 RX ADMIN — AMIODARONE HYDROCHLORIDE 400 MG: 200 TABLET ORAL at 20:16

## 2019-03-18 RX ADMIN — OXYCODONE AND ACETAMINOPHEN 1 TABLET: 5; 325 TABLET ORAL at 23:54

## 2019-03-18 RX ADMIN — POTASSIUM CHLORIDE 20 MEQ: 750 CAPSULE, EXTENDED RELEASE ORAL at 08:24

## 2019-03-18 RX ADMIN — IPRATROPIUM BROMIDE AND ALBUTEROL SULFATE 3 ML: 2.5; .5 SOLUTION RESPIRATORY (INHALATION) at 07:22

## 2019-03-18 RX ADMIN — BISACODYL 10 MG: 5 TABLET ORAL at 20:16

## 2019-03-18 RX ADMIN — INSULIN LISPRO 2 UNITS: 100 INJECTION, SOLUTION INTRAVENOUS; SUBCUTANEOUS at 08:44

## 2019-03-18 RX ADMIN — GLIPIZIDE 2.5 MG: 5 TABLET ORAL at 08:24

## 2019-03-18 RX ADMIN — LIDOCAINE 2 PATCH: 50 PATCH CUTANEOUS at 08:25

## 2019-03-18 RX ADMIN — METOPROLOL TARTRATE 25 MG: 25 TABLET, FILM COATED ORAL at 08:24

## 2019-03-18 RX ADMIN — IPRATROPIUM BROMIDE AND ALBUTEROL SULFATE 3 ML: 2.5; .5 SOLUTION RESPIRATORY (INHALATION) at 16:51

## 2019-03-18 RX ADMIN — ATORVASTATIN CALCIUM 20 MG: 10 TABLET, FILM COATED ORAL at 20:16

## 2019-03-18 RX ADMIN — POTASSIUM CHLORIDE 20 MEQ: 750 CAPSULE, EXTENDED RELEASE ORAL at 17:46

## 2019-03-18 RX ADMIN — ENOXAPARIN SODIUM 30 MG: 30 INJECTION SUBCUTANEOUS at 08:42

## 2019-03-18 RX ADMIN — GLIPIZIDE 2.5 MG: 5 TABLET ORAL at 17:46

## 2019-03-18 RX ADMIN — METOPROLOL TARTRATE 25 MG: 25 TABLET, FILM COATED ORAL at 20:16

## 2019-03-18 RX ADMIN — CHLORHEXIDINE GLUCONATE 15 ML: 1.2 RINSE ORAL at 08:25

## 2019-03-18 RX ADMIN — RAMIPRIL 1.25 MG: 1.25 CAPSULE ORAL at 08:28

## 2019-03-18 RX ADMIN — POLYETHYLENE GLYCOL 3350 17 G: 17 POWDER, FOR SOLUTION ORAL at 15:30

## 2019-03-18 RX ADMIN — INSULIN LISPRO 2 UNITS: 100 INJECTION, SOLUTION INTRAVENOUS; SUBCUTANEOUS at 20:34

## 2019-03-18 RX ADMIN — AMIODARONE HYDROCHLORIDE 400 MG: 200 TABLET ORAL at 05:16

## 2019-03-18 RX ADMIN — ASPIRIN 81 MG: 81 TABLET, DELAYED RELEASE ORAL at 08:24

## 2019-03-18 RX ADMIN — IPRATROPIUM BROMIDE AND ALBUTEROL SULFATE 3 ML: 2.5; .5 SOLUTION RESPIRATORY (INHALATION) at 11:26

## 2019-03-18 NOTE — THERAPY TREATMENT NOTE
Acute Care - Physical Therapy Treatment Note  [facility]     Patient Name: Gerson Wilhelm  : 1945  MRN: [id number]  Today's Date: 3/18/2019  Onset of Illness/Injury or Date of Surgery: 19  Date of Referral to PT: 19  Referring Physician: Dr Orona    Admit Date: 3/11/2019    Visit Dx:    ICD-10-CM ICD-9-CM   1. ENNIS (dyspnea on exertion) R06.09 786.09   2. Impaired mobility Z74.09 799.89   3. Coronary artery disease involving native coronary artery of native heart without angina pectoris I25.10 414.01     Patient Active Problem List   Diagnosis   • ENNIS (dyspnea on exertion)   • Coronary artery disease involving native coronary artery of native heart without angina pectoris   • History of stroke   • Type 2 diabetes mellitus with circulatory disorder, with long-term current use of insulin (CMS/[facility])   • Essential hypertension   • Smokeless tobacco use       Therapy Treatment    Rehabilitation Treatment Summary     Row Name 19 1400 19 0900          Treatment Time/Intention    Discipline  physical therapy assistant  -AE  physical therapy assistant  -LG     Document Type  therapy note (daily note)  -AE  therapy note (daily note)  -LG     Subjective Information  complains of;pain  -AE  no complaints  -LG2     Mode of Treatment  --  individual therapy;physical therapy  -LG2     Patient/Family Observations  --  wife present at end of treatment  -LG2     Existing Precautions/Restrictions  fall;oxygen therapy device and L/min  -AE  fall;oxygen therapy device and L/min  -LG2     Recorded by [AE] Vanessa Jordan, PTA 19 1434 [LG] Lm Hernandez, PTA 19 0901  [LG2] Lm Hernandez, PTA 19 1015     Row Name 19 1400 19 0900          Vital Signs    Pre Systolic BP Rehab  --  132  -LG     Pre Treatment Diastolic BP  --  70  -LG     Post Systolic BP Rehab  --  131  -LG2     Post Treatment Diastolic BP  --  74  -LG2     Pretreatment Heart Rate (beats/min)  --  85  -LG      Posttreatment Heart Rate (beats/min)  --  86  -LG2     Pre SpO2 (%)  --  95  -LG     O2 Delivery Pre Treatment  supplemental O2 2L  -AE  supplemental O2 2L  -LG2     O2 Delivery Intra Treatment  supplemental O2 2L  -AE  supplemental O2 2L  -LG2     Post SpO2 (%)  --  96  -LG2     O2 Delivery Post Treatment  supplemental O2 2L  -AE  supplemental O2 2L  -LG2     Pre Patient Position  --  Sitting  -LG2     Intra Patient Position  --  Standing  -LG2     Post Patient Position  --  Standing  -LG2     Recorded by [AE] Vanessa Jordan, PTA 03/18/19 1434 [LG] Lm Hernandez, PTA 03/18/19 0901  [LG2] Lm Hernandez, PTA 03/18/19 0924     Row Name 03/18/19 1400 03/18/19 0900          Bed Mobility Assessment/Treatment    Comment (Bed Mobility)  up in chair  -AE  up in chair  -LG     Recorded by [AE] Vanessa Jordan, PTA 03/18/19 1434 [LG] Lm Hernandez, PTA 03/18/19 1015     Row Name 03/18/19 1400 03/18/19 0900          Sit-Stand Transfer    Sit-Stand [place] (Transfers)  minimum assist (75% patient effort);2 person assist;verbal cues  -AE  verbal cues;minimum assist (75% patient effort);moderate assist (50% patient effort);2 person assist  -LG     Recorded by [AE] Vanessa Jordan, PTA 03/18/19 1434 [LG] Lm Hernandez, PTA 03/18/19 1015     Row Name 03/18/19 1400 03/18/19 0900          Stand-Sit Transfer    Stand-Sit [place] (Transfers)  minimum assist (75% patient effort);2 person assist;verbal cues  -AE  verbal cues;minimum assist (75% patient effort);2 person assist  -LG     Recorded by [AE] Vanessa Jordan, PTA 03/18/19 1434 [LG] Lm Hernandez, PTA 03/18/19 1015     Row Name 03/18/19 1400 03/18/19 0900          Gait/Stairs Assessment/Training    Gait/Stairs Assessment/Training  --  distance ambulated  -LG     [place] Level (Gait)  minimum assist (75% patient effort);2 person assist  -AE  verbal cues;minimum assist (75% patient effort);2 person assist  -LG     Assistive Device (Gait)  --  -- HHA x 2  -LG      Distance in Feet (Gait)  200  -AE  125'  -LG     Pattern (Gait)  swing-through  -AE  step-to  -LG     Deviations/Abnormal Patterns (Gait)  right sided deviations  -AE  --     Bilateral Gait Deviations  weight shift ability decreased  -AE  --     Recorded by [AE] Vanessa Jordan, PTA 03/18/19 1434 [LG] Lm Hernandez, PTA 03/18/19 1015     Row Name 03/18/19 1400 03/18/19 0900          Therapeutic Exercise    Sets/Reps (Therapeutic Exercise)  20  -AE  --     Comment (Therapeutic Exercise)  cardiac protocol  -AE  Cardiac Protocol x 20 reps  -LG     Recorded by [AE] Vanessa Jordan, PTA 03/18/19 1434 [LG] Lm Hernandez, PTA 03/18/19 1015     Row Name 03/18/19 1400 03/18/19 0900          Positioning and Restraints    Pre-Treatment Position  sitting in chair/recliner  -AE  sitting in chair/recliner  -LG     Post Treatment Position  chair  -AE  chair  -LG     In Chair  sitting;call light within reach  -AE  sitting;call light within reach;encouraged to call for assist;with family/caregiver  -LG     Recorded by [AE] Vanessa Jordan, PTA 03/18/19 1434 [LG] Lm Hernandez, PTA 03/18/19 1015     Row Name 03/18/19 1400 03/18/19 0900          Pain Scale: Numbers Pre/Post-Treatment    Pain Scale: Numbers, Pretreatment  3/10  -AE  0/10 - no pain  -LG     Pain Scale: Numbers, Post-Treatment  3/10  -AE  0/10 - no pain  -LG     Pain Location - Side  Right  -AE  --     Pain Location - Orientation  incisional  -AE  --     Pain Intervention(s)  Medication (See MAR)  -AE  --     Recorded by [AE] Vanessa Jordan, PTA 03/18/19 1434 [LG] Lm Hernandez, PTA 03/18/19 1015     Row Name                Wound 03/14/19 0856 Other (See comments) chest incision    Wound - Properties Group Date first assessed: 03/14/19 [DS] Time first assessed: 0856 [DS] Side: Other (See comments) [DS] Location: chest [DS] Type: incision [DS] Recorded by:  [DS] Bert Isaac RN 03/14/19 0856    Row Name                Wound 03/14/19 0856 Left leg incision    Wound -  Properties Group Date first assessed: 03/14/19 [DS] Time first assessed: 0856 [DS] Side: Left [DS] Location: leg [DS] Type: incision [DS] Recorded by:  [JAGDEEP] Bert Isaac RN 03/14/19 0856      User Key  (r) = Recorded By, (t) = Taken By, (c) = Cosigned By    Initials Name Effective Dates Discipline    AE Vanessa Jordan, PTA 06/22/15 -  PT    LG Lm Hernandez, PTA 08/02/16 -  PT    DS Bert Isaac RN 08/02/16 -  Nurse          Wound 03/14/19 0856 Other (See comments) chest incision (Active)   Dressing Appearance open to air 3/18/2019 12:00 PM   Closure Liquid skin adhesive 3/18/2019 12:00 PM   Base clean;dry 3/18/2019 12:00 PM   Periwound dry;excoriated 3/18/2019  3:47 AM   Periwound Temperature warm 3/18/2019  3:47 AM   Periwound Skin Turgor soft 3/18/2019  3:47 AM   Edges rolled/closed 3/18/2019  3:47 AM   Drainage Amount none 3/18/2019  3:47 AM   Care, Wound cleansed with;soap and water 3/17/2019  8:00 PM   Dressing Care, Wound open to air 3/17/2019  8:00 PM       Wound 03/14/19 0856 Left leg incision (Active)   Dressing Appearance open to air 3/18/2019 12:00 PM   Closure Liquid skin adhesive 3/18/2019 12:00 PM   Base dry;clean 3/18/2019 12:00 PM   Periwound intact;dry;pink 3/18/2019  3:47 AM   Periwound Temperature warm 3/18/2019  3:47 AM   Periwound Skin Turgor soft 3/18/2019  3:47 AM   Edges rolled/closed 3/18/2019  3:47 AM   Drainage Amount none 3/18/2019  3:47 AM   Dressing Care, Wound open to air 3/17/2019  8:00 PM       Wound midline;lower chest puncture (Active)   Dressing Appearance dry;intact 3/18/2019 12:00 PM   Closure RICHIE 3/18/2019 12:00 PM   Base dressing in place, unable to visualize 3/18/2019 12:00 PM   Periwound intact 3/18/2019  3:47 AM   Drainage Amount none 3/18/2019  3:47 AM   Care, Wound cleansed with;soap and water 3/17/2019  8:00 PM   Dressing Care, Wound dressing changed 3/17/2019  8:00 PM           Physical Therapy Education     Title: PT OT SLP Therapies (In Progress)      Topic: Physical Therapy (In Progress)     Point: Mobility training (In Progress)     Learning Progress Summary           Patient Acceptance, E, NR by DARREN at 3/15/2019  8:15 AM    Comment:  Educated pt. on progression of PT POC, benefits of activity, sternal precautions.    Acceptance, E, VU,DU,NR by DARREN at 3/13/2019  2:30 PM    Comment:  Educated pt/spouse on progression of PT POC and benefits of activity. Answered pre questions about open heart surgery being performed tomorrow and educated on sternal precautions.   Significant Other Acceptance, E, VU,DU,NR by DARREN at 3/13/2019  2:30 PM    Comment:  Educated pt/spouse on progression of PT POC and benefits of activity. Answered pre questions about open heart surgery being performed tomorrow and educated on sternal precautions.                   Point: Precautions (In Progress)     Learning Progress Summary           Patient Acceptance, E, NR by DARREN at 3/15/2019  8:15 AM    Comment:  Educated pt. on progression of PT POC, benefits of activity, sternal precautions.                               User Key     Initials Effective Dates Name Provider Type Discipline    DARREN 08/02/16 -  Dinh Melgar, PT DPT Physical Therapist PT                PT Recommendation and Plan           Time Calculation:   PT Charges     Row Name 03/18/19 1434 03/18/19 0900          Time Calculation    Start Time  1400  -AE  0900  -LG     Stop Time  1427  -AE  0924  -LG     Time Calculation (min)  27 min  -AE  24 min  -LG     PT Received On  03/18/19  -AE  03/18/19  -LG     PT Goal Re-Cert Due Date  03/25/19  -AE  03/25/19  -LG        Time Calculation- PT    Total Timed Code Minutes- PT  27 minute(s)  -AE  24 minute(s)  -LG        Timed Charges    02569 - Gait Training Minutes   27  -AE  --       User Key  (r) = Recorded By, (t) = Taken By, (c) = Cosigned By    Initials Name Provider Type    AE Vanessa Jordan, PTA Physical Therapy Assistant    Lm Colindres, WILLIAM Physical Therapy Assistant         Therapy Suggested Charges     Code   Minutes Charges    94258 (CPT®) Hc Pt Neuromusc Re Education Ea 15 Min      37884 (CPT®) Hc Pt Ther Proc Ea 15 Min      35450 (CPT®) Hc Gait Training Ea 15 Min 27 2    32993 (CPT®) Hc Pt Therapeutic Act Ea 15 Min      64397 (CPT®) Hc Pt Manual Therapy Ea 15 Min      50776 (CPT®) Hc Pt Iontophoresis Ea 15 Min      39421 (CPT®) Hc Pt Elec Stim Ea-Per 15 Min      64527 (CPT®) Hc Pt Ultrasound Ea 15 Min      19628 (CPT®) Hc Pt Self Care/Mgmt/Train Ea 15 Min      04059 (CPT®) Hc Pt Prosthetic (S) Train Initial Encounter, Each 15 Min      55807 (CPT®) Hc Pt Orthotic(S)/Prosthetic(S) Encounter, Each 15 Min      05875 (CPT®) Hc Orthotic(S) Mgmt/Train Initial Encounter, Each 15min      Total  27 2        Therapy Charges for Today     Code Description Service Date Service Provider Modifiers Qty    68424693629 HC GAIT TRAINING EA 15 MIN 3/18/2019 Vanessa Jordan, PTA GP 2          PT G-Codes  Outcome Measure Options: AM-PAC 6 Clicks Basic Mobility (PT)  AM-PAC 6 Clicks Score: 11    Vanessa Jordan, WILLIAM  3/18/2019

## 2019-03-18 NOTE — PLAN OF CARE
Problem: Patient Care Overview  Goal: Plan of Care Review  Outcome: Ongoing (interventions implemented as appropriate)   03/18/19 0900   Coping/Psychosocial   Plan of Care Reviewed With patient;spouse   Plan of Care Review   Progress improving   OTHER   Outcome Summary Mod x 2 Sit to stand, Min x 2 Gait of 125', pt very lethargic this morning.

## 2019-03-18 NOTE — PROGRESS NOTES
"CABG x 2, Right leg open vein harvest, placement of right femoral arterial line    POD 4    Up in the chair sleeping. Drowsy but awakens. Wife reports patient walked around ICU with PT. IS ~750, however with encouragement from wife he reaches 1500! On 2 liters nasal cannula. (-) BM.     Visit Vitals  /69   Pulse 80   Temp 96.8 °F (36 °C) (Axillary)   Resp 15   Ht 172.7 cm (68\")   Wt 96.1 kg (211 lb 12.8 oz)   SpO2 98%   BMI 32.20 kg/m²   Baseline weight: 194  2 L/min nasal cannula.    Intake/Output Summary (Last 24 hours) at 3/18/2019 1308  Last data filed at 3/18/2019 1118  Gross per 24 hour   Intake 260 ml   Output 870 ml   Net -610 ml     Labs:      Chest x ray: bibasilar atelectasis, no pneumothorax, small effusions    Physical Exam:  General: No apparent distress. Up in the chair. Sleeping.   Cardiovascular: Regular rate and rhythm without murmur, rubs, or gallops.    Pulmonary: Clear to auscultation bilaterally without wheezing, rubs, or rales.  Chest: Sternotomy incision clean, dry, and intact. Sternum stable. No clicks.   Abdomen: Soft, non-distended, and non-tender.  Extremities: Warm, moves all extremities. Saphenectomy site clean, dry, and intact   Neurologic: Drowsy, but awakens and follows commands. Grossly intact with no focal deficits.    Impression:  Coronary artery disease status post CABG   Left ventricular dysfuction  Type 2 diabetes mellitus  Hypertension  History of stroke  Restrictive lung disease  Hiatal hernia, large  Physical debility    Plan:  ABG now  Decrease pain medication  DC IVF    Encourage increased oral intake.    Encourage pulmonary toilet and ambulation  Routine post cardiac surgery regimen  Keep in unit   DW patient and wife plan of care.    "

## 2019-03-18 NOTE — THERAPY TREATMENT NOTE
Acute Care - Physical Therapy Treatment Note  [facility]     Patient Name: Gerson Wilhelm  : 1945  MRN: [id number]  Today's Date: 3/18/2019  Onset of Illness/Injury or Date of Surgery: 19  Date of Referral to PT: 19  Referring Physician: Dr Orona    Admit Date: 3/11/2019    Visit Dx:    ICD-10-CM ICD-9-CM   1. ENNIS (dyspnea on exertion) R06.09 786.09   2. Impaired mobility Z74.09 799.89   3. Coronary artery disease involving native coronary artery of native heart without angina pectoris I25.10 414.01     Patient Active Problem List   Diagnosis   • ENNIS (dyspnea on exertion)   • Coronary artery disease involving native coronary artery of native heart without angina pectoris   • History of stroke   • Type 2 diabetes mellitus with circulatory disorder, with long-term current use of insulin (CMS/[facility])   • Essential hypertension   • Smokeless tobacco use       Therapy Treatment    Rehabilitation Treatment Summary     Row Name 19 0900             Treatment Time/Intention    Discipline  physical therapy assistant  -LG      Document Type  therapy note (daily note)  -LG      Subjective Information  no complaints  -LG2      Mode of Treatment  individual therapy;physical therapy  -LG2      Patient/Family Observations  wife present at end of treatment  -LG2      Existing Precautions/Restrictions  fall;oxygen therapy device and L/min  -LG2      Recorded by [LG] Lm Hernandez, PTA 19 0901  [LG2] Lm Hernandez, PTA 19 1015      Row Name 19 0900             Vital Signs    Pre Systolic BP Rehab  132  -LG      Pre Treatment Diastolic BP  70  -LG      Post Systolic BP Rehab  131  -LG2      Post Treatment Diastolic BP  74  -LG2      Pretreatment Heart Rate (beats/min)  85  -LG      Posttreatment Heart Rate (beats/min)  86  -LG2      Pre SpO2 (%)  95  -LG      O2 Delivery Pre Treatment  supplemental O2 2L  -LG2      O2 Delivery Intra Treatment  supplemental O2 2L  -LG2      Post SpO2 (%)  96   -LG2      O2 Delivery Post Treatment  supplemental O2 2L  -LG2      Pre Patient Position  Sitting  -LG2      Intra Patient Position  Standing  -LG2      Post Patient Position  Standing  -LG2      Recorded by [LG] Lm Hernandez, [facility] 03/18/19 0901  [LG2] Lm Hernandez, [facility] 03/18/19 0924      Row Name 03/18/19 0900             Bed Mobility Assessment/Treatment    Comment (Bed Mobility)  up in chair  -LG      Recorded by [LG] Lm Hernandez, [facility] 03/18/19 1015      Row Name 03/18/19 0900             Sit-Stand Transfer    Sit-Stand [place] (Transfers)  verbal cues;minimum assist (75% patient effort);moderate assist (50% patient effort);2 person assist  -LG      Recorded by [LG] Lm Hernandez, [facility] 03/18/19 1015      Row Name 03/18/19 0900             Stand-Sit Transfer    Stand-Sit [place] (Transfers)  verbal cues;minimum assist (75% patient effort);2 person assist  -LG      Recorded by [LG] Lm Hernandez, [facility] 03/18/19 1015      Row Name 03/18/19 0900             Gait/Stairs Assessment/Training    Gait/Stairs Assessment/Training  distance ambulated  -LG      [place] Level (Gait)  verbal cues;minimum assist (75% patient effort);2 person assist  -LG      Assistive Device (Gait)  -- HHA x 2  -LG      Distance in Feet (Gait)  125'  -LG      Pattern (Gait)  step-to  -LG      Recorded by [LG] Lm Hernandez, PTA 03/18/19 1015      Row Name 03/18/19 0900             Therapeutic Exercise    Comment (Therapeutic Exercise)  Cardiac Protocol x 20 reps  -LG      Recorded by [LG] Lm Hernandez, [facility] 03/18/19 1015      Row Name 03/18/19 0900             Positioning and Restraints    Pre-Treatment Position  sitting in chair/recliner  -LG      Post Treatment Position  chair  -LG      In Chair  sitting;call light within reach;encouraged to call for assist;with family/caregiver  -LG      Recorded by [LG] Lm Hernandez, [facility] 03/18/19 1015      Row Name 03/18/19 0900             Pain Scale: Numbers Pre/Post-Treatment    Pain Scale:  Numbers, Pretreatment  0/10 - no pain  -LG      Pain Scale: Numbers, Post-Treatment  0/10 - no pain  -LG      Recorded by [LG] Lm Hernandez PTA 03/18/19 1015      Row Name                Wound 03/14/19 0856 Other (See comments) chest incision    Wound - Properties Group Date first assessed: 03/14/19 [DS] Time first assessed: 0856 [DS] Side: Other (See comments) [DS] Location: chest [DS] Type: incision [DS] Recorded by:  [DS] Bert Isaac RN 03/14/19 0856    Row Name                Wound 03/14/19 0856 Left leg incision    Wound - Properties Group Date first assessed: 03/14/19 [DS] Time first assessed: 0856 [DS] Side: Left [DS] Location: leg [DS] Type: incision [DS] Recorded by:  [DS] Bert Isaac RN 03/14/19 0856      User Key  (r) = Recorded By, (t) = Taken By, (c) = Cosigned By    Initials Name Effective Dates Discipline    LG Lm Hernandez PTA 08/02/16 -  PT    DS Bert Isaac RN 08/02/16 -  Nurse          Wound 03/14/19 0856 Other (See comments) chest incision (Active)   Dressing Appearance open to air 3/18/2019  8:10 AM   Closure Liquid skin adhesive 3/18/2019  8:10 AM   Base clean;dry 3/18/2019  8:10 AM   Periwound dry;excoriated 3/18/2019  3:47 AM   Periwound Temperature warm 3/18/2019  3:47 AM   Periwound Skin Turgor soft 3/18/2019  3:47 AM   Edges rolled/closed 3/18/2019  3:47 AM   Drainage Amount none 3/18/2019  3:47 AM   Care, Wound cleansed with;soap and water 3/17/2019  8:00 PM   Dressing Care, Wound open to air 3/17/2019  8:00 PM       Wound 03/14/19 0856 Left leg incision (Active)   Dressing Appearance open to air 3/18/2019  8:10 AM   Closure Liquid skin adhesive 3/18/2019  8:10 AM   Base dry;clean 3/18/2019  8:10 AM   Periwound intact;dry;pink 3/18/2019  3:47 AM   Periwound Temperature warm 3/18/2019  3:47 AM   Periwound Skin Turgor soft 3/18/2019  3:47 AM   Edges rolled/closed 3/18/2019  3:47 AM   Drainage Amount none 3/18/2019  3:47 AM   Dressing Care, Wound open to air 3/17/2019   8:00 PM       Wound midline;lower chest puncture (Active)   Dressing Appearance dry;intact 3/18/2019  8:10 AM   Closure RICHIE 3/18/2019  8:10 AM   Base dressing in place, unable to visualize 3/18/2019  8:10 AM   Periwound intact 3/18/2019  3:47 AM   Drainage Amount none 3/18/2019  3:47 AM   Care, Wound cleansed with;soap and water 3/17/2019  8:00 PM   Dressing Care, Wound dressing changed 3/17/2019  8:00 PM           Physical Therapy Education     Title: PT OT SLP Therapies (In Progress)     Topic: Physical Therapy (In Progress)     Point: Mobility training (In Progress)     Learning Progress Summary           Patient Acceptance, E, NR by DARREN at 3/15/2019  8:15 AM    Comment:  Educated pt. on progression of PT POC, benefits of activity, sternal precautions.    Acceptance, E, VU,DU,NR by DARREN at 3/13/2019  2:30 PM    Comment:  Educated pt/spouse on progression of PT POC and benefits of activity. Answered pre questions about open heart surgery being performed tomorrow and educated on sternal precautions.   Significant Other Acceptance, E, VU,DU,NR by DARREN at 3/13/2019  2:30 PM    Comment:  Educated pt/spouse on progression of PT POC and benefits of activity. Answered pre questions about open heart surgery being performed tomorrow and educated on sternal precautions.                   Point: Precautions (In Progress)     Learning Progress Summary           Patient Acceptance, E, NR by DARREN at 3/15/2019  8:15 AM    Comment:  Educated pt. on progression of PT POC, benefits of activity, sternal precautions.                               User Key     Initials Effective Dates Name Provider Type Discipline    DARREN 08/02/16 -  Dinh Melgar, PT DPT Physical Therapist PT                PT Recommendation and Plan     Plan of Care Reviewed With: patient, spouse  Progress: improving  Outcome Summary: Mod x 2 Sit to stand, Min x 2 Gait of 125', pt very lethargic this morning.      Time Calculation:   PT Charges     Row Name 03/18/19 0900              Time Calculation    Start Time  0900  -LG      Stop Time  0924  -LG      Time Calculation (min)  24 min  -LG      PT Received On  03/18/19  -LG      PT Goal Re-Cert Due Date  03/25/19  -LG         Time Calculation- PT    Total Timed Code Minutes- PT  24 minute(s)  -LG        User Key  (r) = Recorded By, (t) = Taken By, (c) = Cosigned By    Initials Name Provider Type    LG Lm Hernandez, PTA Physical Therapy Assistant        Therapy Suggested Charges     Code   Minutes Charges    68911 (CPT®) Hc Pt Neuromusc Re Education Ea 15 Min      73418 (CPT®) Hc Pt Ther Proc Ea 15 Min      83285 (CPT®) Hc Gait Training Ea 15 Min 26 2    27277 (CPT®) Hc Pt Therapeutic Act Ea 15 Min      45666 (CPT®) Hc Pt Manual Therapy Ea 15 Min      79088 (CPT®) Hc Pt Iontophoresis Ea 15 Min      98362 (CPT®) Hc Pt Elec Stim Ea-Per 15 Min      39974 (CPT®) Hc Pt Ultrasound Ea 15 Min      85846 (CPT®) Hc Pt Self Care/Mgmt/Train Ea 15 Min      08885 (CPT®) Hc Pt Prosthetic (S) Train Initial Encounter, Each 15 Min      40378 (CPT®) Hc Pt Orthotic(S)/Prosthetic(S) Encounter, Each 15 Min      67770 (CPT®) Hc Orthotic(S) Mgmt/Train Initial Encounter, Each 15min      Total  26 2        Therapy Charges for Today     Code Description Service Date Service Provider Modifiers Qty    40957997492 HC GAIT TRAINING EA 15 MIN 3/18/2019 Lm Hernandez, PTA GP 1    [id number] HC PT THER PROC EA 15 MIN 3/18/2019 Lm Hernandez PTA GP 1          PT G-Codes  Outcome Measure Options: AM-PAC 6 Clicks Basic Mobility (PT)  AM-PAC 6 Clicks Score: 11    Lm Hernandez PTA  3/18/2019

## 2019-03-18 NOTE — OP NOTE
Preoperative diagnosis:  1. Coronary artery disease  2. Chronic systolic heart failure  3. Type 2 diabetes mellitus with long-term use of insulin  4. History of stroke  5. Restrictive lung disease-severe  6. Large hiatal hernia    Postoperative diagnosis:  Same     Procedure:    1. Coronary artery bypass grafting-2 vessel (left internal mammary artery/left anterior descending and reverse saphenous vein graft/diagonal artery)      Surgeon: Richard Orona M.D.  Asst.: DARY Castañeda   Anesthesia: Eugene Mehta M.D.    Estimated blood loss: Minimal (Cell Saver)  Drains:  1. 19 Telugu Josias drain-left pleural space  2. 24 Telugu Josias drains-anterior and posterior mediastinum  Specimens: None     Operative findings:   Excellent arterial conduit. Excellent venous conduit. The diagonal artery at site of grafting measured 1.8 mm in size and had mild atherosclerotic disease burden. Post bypass grafting it had excellent arterial runoff. The LAD at site of grafting measured 2.1 mm in size and had mild atherosclerotic disease burden. Post bypass grafting it had excellent arterial runoff. Transesophageal echocardiography was limited due to restriction advancing the probe into the stomach and therefore had limited views but ventricular function was consistent with preoperative transthoracic echo.  There was mild mitral valve regurgitation.       Total aortic cross-clamp time: 52 minutes  Total cardiac bypass time: 78 minutes     Operative description in detail:  he was taken to the operative suite where he  was placed in a supine position. Induction of general anesthesia and placement of a single-lumen endotracheal tube was performed without remark. Appropriate arterial and venous access was established without remark. Through the previously placed right internal jugular central venous line, a pulmonary artery catheter was floated into position. he was then prepped and draped in the usual and sterile  surgical fashion. A timeout was performed. Perioperative antibiotics were administered. Beta blocker was given.    A two team approach was utilized to harvest both the left internal mammary artery and the left greater saphenous vein. Briefly the left greater saphenous vein was taken at the level of the ankle open and taken in a prograde fashion for an anticipated length of vein.  All side branches were ligated in continuity and divided.  The vein was extracted from a hemostatic bed.  The leg was closed in a layered fashion with Vicryl suture.  The vein was prepared without remark.    While the vein was being harvested,  median sternotomy was performed by me. Pericardial fat in midline from the level of the innominate vein to the level of the diaphragm was divided in midline. A Rultract device was used to expose the posterior sternal table. The left internal mammary artery was taken down using a standard pedicle technique with a combination of electrocautery and/or clips to control all side branches. At that time, he was systemically anticoagulated with IV heparin. A 19 Persian Josias drain was placed in the left pleural space. After suitable time of circulating heparin, clips were placed doubly onto the mammary artery distally and it was divided proximal to the previously placed clips. It had excellent arterial inflow. The mammary artery was controlled distally. The mammary artery harvest bed was hemostatic. The Rultract device was removed from the sterile field and a Genesse retractor was used for exposure. The mammary artery was prepared for bypass grafting and deemed excellent. A pericardial well was created. I elected to cannulate the heart centrally accessing distally the ascending aorta and the right atrial appendage.  Each cannula was placed in continuity with the appropriate pump line. Retrograde autologous prime was completed as indicated. A combined cardioplegia/aortic root vent set was secured with a  horizontal mattress 4-0 Prolene suture. With an appropriate ACT and all in readiness, cardiopulmonary bypass was commenced. I dissected out the diagonal artery and the LAD for suitable sites for bypass grafting.  With that, I proceeded to apply the aortic cross-clamp and administered cardioplegia utilizing a warm induction strategy with amino acid enriched component. Upon achieving electrical-mechanical arrest, cold blood potassium cardioplegia was administered to a total standard dose. We did implement systemic hypothermia mild and apply topical hypothermia to the ventricle. At appropriate intervals, doses of cardioplegia were administered throughout the conduct of bypass grafting.     I directed my attention towards the diagonal artery.  A coronary arteriotomy was made and augmented to size with Coleman scissors. It is as per operative findings. The anastomosis was constructed in an end-to-side orientation with running 7-0 Prolene suture. With that its proximal anastomosis was  constructed following aortotomy with 11 blade and augmented size with 4 mm arterial punch subsequently.  This was constructed in a side aorta end vein graft fashion with running 6-0 Prolene suture. An AC  was placed.  The graft was assessed for lay which was excellent. It is without tension or torsion.     During a dose of cardioplegia, a pericardial slit left lateral was made while dividing associate pericardiophrenic fat and vasculature while being mindful of the lay of the phrenic nerve. As such I proceeded to obtain control proximally onto the mammary artery and spatulated it distally. I grafted this onto the LAD following coronary arteriotomy using previous described techniques. The anastomosis was constructed in an end-to-side orientation with running 8-0 Prolene suture. It is as per operative findings and the anastomosis was hemostatic. I did tack the mammary artery pedicle to the anterior aspect heart with 6-0 Prolene  sutures.      With that being accomplished, a terminal hotshot was administered. He was placed in trendelenburg position. Upon completion of terminal hotshot and placement of temporary epicardial pacing wires, with the aortic vent on high and pump flows diminished, the aortic cross-clamp was released. With that, full support was implemented.  A perfusable rhythm spontaneously returned. A nonworking beating phase was implemented. Ventilation restored. I surveyed each graft and each anastomosis was hemostatic and had excellent lay. With all in readiness, the heart was allowed to fill. De-airing maneuvers were performed as guided by transesophageal echocardiography. With that the heart was decompressed and we removed the aortic root vent/cardioplegia cannula set. Its associated pursestring suture was tied securely and this was reinforced as per matter of routine. The table was now placed in neutral position. With all in readiness, we proceeded to wean from and separate from cardiopulmonary bypass. I did decannulate the venous line and snared down its associated pursestring suture. Systemic intravenous protamine was administered. All associated blood volume was returned to the patient. With continued good hemodynamics, I decannulate the arterial line and tied down it associated pursestring sutures. At this time I did tie down the previously snared pursestring suture. The mediastinum was drained with 24 Algerian Josias drains placed anteriorly and posteriorly. I surveyed the chest and hemostasis was pristine. With that I impregnated the sternal edges with vancomycin, thrombin, and Gelfoam paste. The sternum was reapproximated with stainless sterile wires placed in an interrupted fashion. In layers anatomically the soft tissue planes were reapproximated. Instruments, sharps, and sponge counts were reported as correct.      Complications: None     Disposition: Transferred to ICU in stable and guarded condition.

## 2019-03-19 PROBLEM — J98.4 RESTRICTIVE LUNG DISEASE: Status: ACTIVE | Noted: 2019-03-19

## 2019-03-19 PROBLEM — E66.9 OBESITY (BMI 30-39.9): Status: ACTIVE | Noted: 2019-03-19

## 2019-03-19 PROBLEM — I50.22 CHRONIC SYSTOLIC HEART FAILURE (HCC): Status: ACTIVE | Noted: 2019-03-19

## 2019-03-19 PROBLEM — I48.0 PAROXYSMAL ATRIAL FIBRILLATION (HCC): Status: ACTIVE | Noted: 2019-03-19

## 2019-03-19 PROBLEM — K44.9 HIATAL HERNIA: Status: ACTIVE | Noted: 2019-03-19

## 2019-03-19 LAB
ALBUMIN SERPL-MCNC: 2.9 G/DL (ref 3.5–5)
ALBUMIN/GLOB SERPL: 1.5 G/DL (ref 1.1–2.5)
ALP SERPL-CCNC: 74 U/L (ref 24–120)
ALT SERPL W P-5'-P-CCNC: 34 U/L (ref 0–54)
ANION GAP SERPL CALCULATED.3IONS-SCNC: 11 MMOL/L (ref 4–13)
AST SERPL-CCNC: 29 U/L (ref 7–45)
BASOPHILS # BLD AUTO: 0.03 10*3/MM3 (ref 0–0.2)
BASOPHILS NFR BLD AUTO: 0.6 % (ref 0–2)
BILIRUB SERPL-MCNC: 0.8 MG/DL (ref 0.1–1)
BUN BLD-MCNC: 28 MG/DL (ref 5–21)
BUN/CREAT SERPL: 24.3 (ref 7–25)
CALCIUM SPEC-SCNC: 8.5 MG/DL (ref 8.4–10.4)
CHLORIDE SERPL-SCNC: 112 MMOL/L (ref 98–110)
CO2 SERPL-SCNC: 24 MMOL/L (ref 24–31)
CREAT BLD-MCNC: 1.15 MG/DL (ref 0.5–1.4)
DEPRECATED RDW RBC AUTO: 50.5 FL (ref 40–54)
EOSINOPHIL # BLD AUTO: 0.2 10*3/MM3 (ref 0–0.7)
EOSINOPHIL NFR BLD AUTO: 4.1 % (ref 0–4)
ERYTHROCYTE [DISTWIDTH] IN BLOOD BY AUTOMATED COUNT: 15.8 % (ref 12–15)
GFR SERPL CREATININE-BSD FRML MDRD: 63 ML/MIN/1.73
GLOBULIN UR ELPH-MCNC: 2 GM/DL
GLUCOSE BLD-MCNC: 200 MG/DL (ref 70–100)
GLUCOSE BLDC GLUCOMTR-MCNC: 139 MG/DL (ref 70–130)
GLUCOSE BLDC GLUCOMTR-MCNC: 158 MG/DL (ref 70–130)
GLUCOSE BLDC GLUCOMTR-MCNC: 221 MG/DL (ref 70–130)
GLUCOSE BLDC GLUCOMTR-MCNC: 285 MG/DL (ref 70–130)
HCT VFR BLD AUTO: 30.6 % (ref 40–52)
HGB BLD-MCNC: 9.8 G/DL (ref 14–18)
IMM GRANULOCYTES # BLD AUTO: 0.02 10*3/MM3 (ref 0–0.05)
IMM GRANULOCYTES NFR BLD AUTO: 0.4 % (ref 0–5)
LYMPHOCYTES # BLD AUTO: 0.51 10*3/MM3 (ref 0.72–4.86)
LYMPHOCYTES NFR BLD AUTO: 10.5 % (ref 15–45)
MCH RBC QN AUTO: 28.7 PG (ref 28–32)
MCHC RBC AUTO-ENTMCNC: 32 G/DL (ref 33–36)
MCV RBC AUTO: 89.5 FL (ref 82–95)
MONOCYTES # BLD AUTO: 0.35 10*3/MM3 (ref 0.19–1.3)
MONOCYTES NFR BLD AUTO: 7.2 % (ref 4–12)
NEUTROPHILS # BLD AUTO: 3.77 10*3/MM3 (ref 1.87–8.4)
NEUTROPHILS NFR BLD AUTO: 77.2 % (ref 39–78)
NRBC BLD AUTO-RTO: 0 /100 WBC (ref 0–0)
PLATELET # BLD AUTO: 165 10*3/MM3 (ref 130–400)
PMV BLD AUTO: 10.1 FL (ref 6–12)
POTASSIUM BLD-SCNC: 4.8 MMOL/L (ref 3.5–5.3)
PROT SERPL-MCNC: 4.9 G/DL (ref 6.3–8.7)
RBC # BLD AUTO: 3.42 10*6/MM3 (ref 4.8–5.9)
SODIUM BLD-SCNC: 147 MMOL/L (ref 135–145)
WBC NRBC COR # BLD: 4.88 10*3/MM3 (ref 4.8–10.8)

## 2019-03-19 PROCEDURE — 94799 UNLISTED PULMONARY SVC/PX: CPT

## 2019-03-19 PROCEDURE — 93005 ELECTROCARDIOGRAM TRACING: CPT | Performed by: THORACIC SURGERY (CARDIOTHORACIC VASCULAR SURGERY)

## 2019-03-19 PROCEDURE — 63710000001 INSULIN LISPRO (HUMAN) PER 5 UNITS: Performed by: THORACIC SURGERY (CARDIOTHORACIC VASCULAR SURGERY)

## 2019-03-19 PROCEDURE — 25010000002 AMIODARONE IN DEXTROSE 5% 360-4.14 MG/200ML-% SOLUTION: Performed by: THORACIC SURGERY (CARDIOTHORACIC VASCULAR SURGERY)

## 2019-03-19 PROCEDURE — 85025 COMPLETE CBC W/AUTO DIFF WBC: CPT | Performed by: THORACIC SURGERY (CARDIOTHORACIC VASCULAR SURGERY)

## 2019-03-19 PROCEDURE — 94760 N-INVAS EAR/PLS OXIMETRY 1: CPT

## 2019-03-19 PROCEDURE — 97110 THERAPEUTIC EXERCISES: CPT

## 2019-03-19 PROCEDURE — 97530 THERAPEUTIC ACTIVITIES: CPT

## 2019-03-19 PROCEDURE — 99024 POSTOP FOLLOW-UP VISIT: CPT | Performed by: NURSE PRACTITIONER

## 2019-03-19 PROCEDURE — 97116 GAIT TRAINING THERAPY: CPT

## 2019-03-19 PROCEDURE — 25010000002 ENOXAPARIN PER 10 MG: Performed by: NURSE PRACTITIONER

## 2019-03-19 PROCEDURE — 80053 COMPREHEN METABOLIC PANEL: CPT | Performed by: THORACIC SURGERY (CARDIOTHORACIC VASCULAR SURGERY)

## 2019-03-19 PROCEDURE — 82962 GLUCOSE BLOOD TEST: CPT

## 2019-03-19 PROCEDURE — 93010 ELECTROCARDIOGRAM REPORT: CPT | Performed by: INTERNAL MEDICINE

## 2019-03-19 RX ORDER — AMIODARONE HYDROCHLORIDE 200 MG/1
400 TABLET ORAL 3 TIMES DAILY
Status: DISCONTINUED | OUTPATIENT
Start: 2019-03-19 | End: 2019-03-21

## 2019-03-19 RX ORDER — RAMIPRIL 2.5 MG/1
2.5 CAPSULE ORAL
Status: DISCONTINUED | OUTPATIENT
Start: 2019-03-19 | End: 2019-03-21

## 2019-03-19 RX ADMIN — OXYCODONE AND ACETAMINOPHEN 1 TABLET: 5; 325 TABLET ORAL at 12:10

## 2019-03-19 RX ADMIN — INSULIN LISPRO 6 UNITS: 100 INJECTION, SOLUTION INTRAVENOUS; SUBCUTANEOUS at 18:12

## 2019-03-19 RX ADMIN — AMIODARONE HYDROCHLORIDE 1 MG/MIN: 1.8 INJECTION, SOLUTION INTRAVENOUS at 08:04

## 2019-03-19 RX ADMIN — GLIPIZIDE 2.5 MG: 5 TABLET ORAL at 07:49

## 2019-03-19 RX ADMIN — AMIODARONE HYDROCHLORIDE 400 MG: 200 TABLET ORAL at 20:34

## 2019-03-19 RX ADMIN — INSULIN LISPRO 4 UNITS: 100 INJECTION, SOLUTION INTRAVENOUS; SUBCUTANEOUS at 07:49

## 2019-03-19 RX ADMIN — LIDOCAINE 2 PATCH: 50 PATCH CUTANEOUS at 08:27

## 2019-03-19 RX ADMIN — POTASSIUM CHLORIDE 20 MEQ: 750 CAPSULE, EXTENDED RELEASE ORAL at 09:25

## 2019-03-19 RX ADMIN — PANTOPRAZOLE SODIUM 40 MG: 40 TABLET, DELAYED RELEASE ORAL at 05:43

## 2019-03-19 RX ADMIN — IPRATROPIUM BROMIDE AND ALBUTEROL SULFATE 3 ML: 2.5; .5 SOLUTION RESPIRATORY (INHALATION) at 19:54

## 2019-03-19 RX ADMIN — ATORVASTATIN CALCIUM 20 MG: 10 TABLET, FILM COATED ORAL at 20:34

## 2019-03-19 RX ADMIN — IPRATROPIUM BROMIDE AND ALBUTEROL SULFATE 3 ML: 2.5; .5 SOLUTION RESPIRATORY (INHALATION) at 14:59

## 2019-03-19 RX ADMIN — CLOPIDOGREL 75 MG: 75 TABLET, FILM COATED ORAL at 18:14

## 2019-03-19 RX ADMIN — ENOXAPARIN SODIUM 30 MG: 30 INJECTION SUBCUTANEOUS at 20:36

## 2019-03-19 RX ADMIN — AMIODARONE HYDROCHLORIDE 400 MG: 200 TABLET ORAL at 16:55

## 2019-03-19 RX ADMIN — IPRATROPIUM BROMIDE AND ALBUTEROL SULFATE 3 ML: 2.5; .5 SOLUTION RESPIRATORY (INHALATION) at 11:37

## 2019-03-19 RX ADMIN — GLIPIZIDE 2.5 MG: 5 TABLET ORAL at 16:55

## 2019-03-19 RX ADMIN — IPRATROPIUM BROMIDE AND ALBUTEROL SULFATE 3 ML: 2.5; .5 SOLUTION RESPIRATORY (INHALATION) at 07:07

## 2019-03-19 RX ADMIN — BISACODYL 10 MG: 5 TABLET ORAL at 08:26

## 2019-03-19 RX ADMIN — RAMIPRIL 2.5 MG: 2.5 CAPSULE ORAL at 09:25

## 2019-03-19 RX ADMIN — POLYETHYLENE GLYCOL 3350 17 G: 17 POWDER, FOR SOLUTION ORAL at 08:30

## 2019-03-19 RX ADMIN — OXYCODONE AND ACETAMINOPHEN 1 TABLET: 5; 325 TABLET ORAL at 21:45

## 2019-03-19 RX ADMIN — METOPROLOL TARTRATE 12.5 MG: 25 TABLET, FILM COATED ORAL at 08:30

## 2019-03-19 RX ADMIN — AMIODARONE HYDROCHLORIDE 400 MG: 200 TABLET ORAL at 07:51

## 2019-03-19 RX ADMIN — METOPROLOL TARTRATE 25 MG: 25 TABLET, FILM COATED ORAL at 07:51

## 2019-03-19 RX ADMIN — ASPIRIN 81 MG: 81 TABLET, DELAYED RELEASE ORAL at 08:30

## 2019-03-19 RX ADMIN — AMIODARONE HYDROCHLORIDE 1 MG/MIN: 1.8 INJECTION, SOLUTION INTRAVENOUS at 02:04

## 2019-03-19 RX ADMIN — METOPROLOL TARTRATE 37.5 MG: 25 TABLET, FILM COATED ORAL at 20:35

## 2019-03-19 RX ADMIN — ENOXAPARIN SODIUM 30 MG: 30 INJECTION SUBCUTANEOUS at 10:29

## 2019-03-19 RX ADMIN — ESCITSLOPRAM 5 MG: 10 TABLET ORAL at 20:36

## 2019-03-19 RX ADMIN — POTASSIUM CHLORIDE 20 MEQ: 750 CAPSULE, EXTENDED RELEASE ORAL at 18:12

## 2019-03-19 NOTE — PLAN OF CARE
Problem: Patient Care Overview  Goal: Plan of Care Review  Outcome: Ongoing (interventions implemented as appropriate)   03/19/19 0528   Coping/Psychosocial   Plan of Care Reviewed With patient   Plan of Care Review   Progress improving   OTHER   Outcome Summary Pt alert and oriented x 4. Amniodarone restarted for heart rate. Current HR . BP has been 140s systolic. Urine output adequate. Afebrile.        Problem: Fall Risk (Adult)  Goal: Absence of Fall  Outcome: Ongoing (interventions implemented as appropriate)      Problem: Skin Injury Risk (Adult)  Goal: Identify Related Risk Factors and Signs and Symptoms  Outcome: Ongoing (interventions implemented as appropriate)    Goal: Skin Health and Integrity  Outcome: Ongoing (interventions implemented as appropriate)      Problem: Cardiac Surgery (Adult)  Goal: Signs and Symptoms of Listed Potential Problems Will be Absent, Minimized or Managed (Cardiac Surgery)  Outcome: Ongoing (interventions implemented as appropriate)

## 2019-03-19 NOTE — PLAN OF CARE
Problem: Patient Care Overview  Goal: Plan of Care Review  Outcome: Ongoing (interventions implemented as appropriate)   03/19/19 0902   Coping/Psychosocial   Plan of Care Reviewed With patient   Plan of Care Review   Progress improving   OTHER   Outcome Summary Pt up in chair. Min-Mod x 2 sit to stand, Min x 2 Gait x 200'.

## 2019-03-19 NOTE — THERAPY TREATMENT NOTE
Acute Care - Physical Therapy Treatment Note  [facility]     Patient Name: Gerson Wilhelm  : 1945  MRN: [id number]  Today's Date: 3/19/2019  Onset of Illness/Injury or Date of Surgery: 19  Date of Referral to PT: 19  Referring Physician: Dr Orona    Admit Date: 3/11/2019    Visit Dx:    ICD-10-CM ICD-9-CM   1. ENNIS (dyspnea on exertion) R06.09 786.09   2. Impaired mobility Z74.09 799.89   3. Coronary artery disease involving native coronary artery of native heart without angina pectoris I25.10 414.01     Patient Active Problem List   Diagnosis   • ENNIS (dyspnea on exertion)   • Coronary artery disease involving native coronary artery of native heart without angina pectoris   • History of stroke   • Type 2 diabetes mellitus with circulatory disorder, with long-term current use of insulin (CMS/HCC)   • Essential hypertension   • Smokeless tobacco use   • Paroxysmal atrial fibrillation (CMS/HCC)   • Chronic systolic heart failure (CMS/HCC)   • Restrictive lung disease   • Hiatal hernia   • Obesity (BMI 30-39.9)       Therapy Treatment    Rehabilitation Treatment Summary     Row Name 19 1410 19 0902          Treatment Time/Intention    Discipline  physical therapy assistant  -LG  physical therapy assistant  -LG     Document Type  therapy note (daily note)  -LG  therapy note (daily note)  -LG     Subjective Information  no complaints  -LG2  no complaints  -LG     Mode of Treatment  individual therapy;physical therapy  -LG2  individual therapy;physical therapy  -LG     Existing Precautions/Restrictions  fall;sternal  -LG2  fall;sternal  -LG     Recorded by [LG] Lm Hernandez, PTA 19 1411  [LG2] Lm Hernandez, PTA 19 1429 [LG] Lm Hernandez, PTA 19 0956     Row Name 19 1410 19 0902          Vital Signs    Pre Systolic BP Rehab  133  -LG  153  -LG     Pre Treatment Diastolic BP  87  -LG  95  -LG     Post Systolic BP Rehab  133  -LG2  145  -LG     Post Treatment  Diastolic BP  87  -LG2  78  -LG     Pretreatment Heart Rate (beats/min)  98  -LG  91  -LG     Posttreatment Heart Rate (beats/min)  105  -LG2  89  -LG     Pre SpO2 (%)  92  -LG  93  -LG     O2 Delivery Pre Treatment  room air  -LG  room air  -LG     O2 Delivery Intra Treatment  room air  -LG2  --     Post SpO2 (%)  97  -LG2  94  -LG     O2 Delivery Post Treatment  room air  -LG2  room air  -LG     Recorded by [LG] Lm Hernandez, PTA 03/19/19 1411  [LG2] Lm Hernandez, PTA 03/19/19 1429 [LG] Lm Hernandez, PTA 03/19/19 0956     Row Name 03/19/19 1410 03/19/19 0902          Bed Mobility Assessment/Treatment    Comment (Bed Mobility)  up in chair  -LG  up in chair  -LG     Recorded by [LG] Lm Hernandez, PTA 03/19/19 1429 [LG] Lm Hernandez, PTA 03/19/19 0956     Row Name 03/19/19 1410 03/19/19 0902          Sit-Stand Transfer    Sit-Stand [place] (Transfers)  verbal cues;minimum assist (75% patient effort);moderate assist (50% patient effort);2 person assist  -LG  verbal cues;minimum assist (75% patient effort);moderate assist (50% patient effort);2 person assist  -LG     Recorded by [LG] Lm Hernandez, PTA 03/19/19 1429 [LG] Lm Hernandez, PTA 03/19/19 0956     Row Name 03/19/19 1410 03/19/19 0902          Stand-Sit Transfer    Stand-Sit [place] (Transfers)  verbal cues;minimum assist (75% patient effort);2 person assist  -LG  verbal cues;minimum assist (75% patient effort);2 person assist  -LG     Recorded by [LG] Lm Hernandez, PTA 03/19/19 1429 [LG] Lm Hernandez, PTA 03/19/19 0956     Row Name 03/19/19 1410 03/19/19 0902          Gait/Stairs Assessment/Training    Gait/Stairs Assessment/Training  distance ambulated  -LG  distance ambulated  -LG     [place] Level (Gait)  minimum assist (75% patient effort);2 person assist  -LG  minimum assist (75% patient effort);2 person assist  -LG     Assistive Device (Gait)  -- HHA x 2  -LG  -- HHA x 2  -LG     Distance in Feet (Gait)  200  -LG  200  -LG      Pattern (Gait)  step-to  -LG  step-to  -LG     Deviations/Abnormal Patterns (Gait)  base of support, narrow;bilateral deviations;gait speed decreased;stride length decreased  -LG  --     Bilateral Gait Deviations  forward flexed posture  -LG  --     Recorded by [LG] Lm Hernandez, PTA 03/19/19 1429 [LG] Lm Hernandez, PTA 03/19/19 0956     Row Name 03/19/19 1410 03/19/19 0902          Therapeutic Exercise    Comment (Therapeutic Exercise)  Cardiac Protocol x 20 reps  -LG  Cardiac Protocol  -LG     Recorded by [LG] Lm Hernandez, PTA 03/19/19 1429 [LG] Lm Hernandez, PTA 03/19/19 0956     Row Name 03/19/19 1410 03/19/19 0902          Positioning and Restraints    Pre-Treatment Position  sitting in chair/recliner  -LG  sitting in chair/recliner  -LG     Post Treatment Position  wheelchair  -LG  chair  -LG     In Chair  --  sitting;call light within reach;encouraged to call for assist;notified nsg  -LG     In Wheelchair  sitting;with family/caregiver;with nsg transfering to [facility]  -  --     Recorded by [LG] Lm Hernandez, PTA 03/19/19 1429 [LG] Lm Hernandez, PTA 03/19/19 0956     Row Name 03/19/19 0902             Pain Scale: Numbers Pre/Post-Treatment    Pain Scale: Numbers, Pretreatment  0/10 - no pain  -LG      Pain Scale: Numbers, Post-Treatment  0/10 - no pain  -LG      Recorded by [LG] Lm Hernandez, PTA 03/19/19 0956      Row Name                Wound 03/14/19 0856 Other (See comments) chest incision    Wound - Properties Group Date first assessed: 03/14/19 [DS] Time first assessed: 0856 [DS] Side: Other (See comments) [DS] Location: chest [DS] Type: incision [DS] Recorded by:  [DS] Bert Isaac RN 03/14/19 0856    Row Name                Wound 03/14/19 0856 Left leg incision    Wound - Properties Group Date first assessed: 03/14/19 [DS] Time first assessed: 0856 [DS] Side: Left [DS] Location: leg [DS] Type: incision [DS] Recorded by:  [DS] Bert Isaac RN 03/14/19 0856      User Key  (r) = Recorded  By, (t) = Taken By, (c) = Cosigned By    Initials Name Effective Dates Discipline    LG Lm Hernandez, WILLIAM 08/02/16 -  PT    DS Bert Isaac, RN 08/02/16 -  Nurse          Wound 03/14/19 0856 Other (See comments) chest incision (Active)   Dressing Appearance open to air 3/19/2019  7:45 AM   Closure Liquid skin adhesive 3/19/2019  7:45 AM   Base clean;dry 3/19/2019  7:45 AM   Care, Wound cleansed with;soap and water 3/18/2019  8:00 PM       Wound 03/14/19 0856 Left leg incision (Active)   Dressing Appearance open to air 3/19/2019  7:45 AM   Closure Liquid skin adhesive 3/19/2019  7:45 AM   Base dry;clean 3/19/2019  7:45 AM   Care, Wound cleansed with;soap and water 3/18/2019  8:00 PM       Wound midline;lower chest puncture (Active)   Dressing Appearance dry;intact 3/19/2019  7:45 AM   Closure RICHIE 3/19/2019  7:45 AM   Base dressing in place, unable to visualize 3/19/2019  7:45 AM   Periwound intact 3/19/2019  4:05 AM   Care, Wound cleansed with;soap and water 3/18/2019  8:00 PM   Dressing Care, Wound dressing changed;gauze 3/18/2019  8:00 PM           Physical Therapy Education     Title: PT OT SLP Therapies (In Progress)     Topic: Physical Therapy (In Progress)     Point: Mobility training (In Progress)     Learning Progress Summary           Patient Acceptance, E, NR by DARREN at 3/15/2019  8:15 AM    Comment:  Educated pt. on progression of PT POC, benefits of activity, sternal precautions.    Acceptance, E, VU,DU,NR by DARREN at 3/13/2019  2:30 PM    Comment:  Educated pt/spouse on progression of PT POC and benefits of activity. Answered pre questions about open heart surgery being performed tomorrow and educated on sternal precautions.   Significant Other Acceptance, E, VU,DU,NR by DARREN at 3/13/2019  2:30 PM    Comment:  Educated pt/spouse on progression of PT POC and benefits of activity. Answered pre questions about open heart surgery being performed tomorrow and educated on sternal precautions.                    Point: Precautions (In Progress)     Learning Progress Summary           Patient Acceptance, E, NR by DARREN at 3/15/2019  8:15 AM    Comment:  Educated pt. on progression of PT POC, benefits of activity, sternal precautions.                               User Key     Initials Effective Dates Name Provider Type Discipline    DARREN 08/02/16 -  Dinh Melgar, PT DPT Physical Therapist PT                PT Recommendation and Plan     Plan of Care Reviewed With: patient  Progress: improving  Outcome Summary: Pt up in chair. Min-Mod x 2 sit to stand, Min x 2 Gait x 200'.      Time Calculation:   PT Charges     Row Name 03/19/19 1410 03/19/19 0902          Time Calculation    Start Time  1410  -LG  0902  -LG     Stop Time  1433  -LG  0940  -LG     Time Calculation (min)  23 min  -LG  38 min  -LG     PT Received On  03/19/19  -LG  03/19/19  -LG     PT Goal Re-Cert Due Date  03/25/19  -LG  03/25/19  -LG        Time Calculation- PT    Total Timed Code Minutes- PT  23 minute(s)  -LG  38 minute(s)  -LG       User Key  (r) = Recorded By, (t) = Taken By, (c) = Cosigned By    Initials Name Provider Type    LG Lm Hernandez, PTA Physical Therapy Assistant        Therapy Charges for Today     Code Description Service Date Service Provider Modifiers Qty    49119220639 HC GAIT TRAINING EA 15 MIN 3/18/2019 Lm Hernandez PTA GP 1    00699927706 HC PT THER PROC EA 15 MIN 3/18/2019 Lm Hernandez PTA GP 1    46029411750 HC GAIT TRAINING EA 15 MIN 3/19/2019 Lm Hernandez PTA GP 1    04097231798 HC PT THERAPEUTIC ACT EA 15 MIN 3/19/2019 Lm Hernandez, PTA GP 1    73252214155 HC PT THER PROC EA 15 MIN 3/19/2019 Lm Hernandez, PTA GP 1    97061579136 HC GAIT TRAINING EA 15 MIN 3/19/2019 Lm Hernandez PTA GP 1    21718221619 HC PT THER PROC EA 15 MIN 3/19/2019 Lm Hernandez, PTA GP 1          PT G-Codes  Outcome Measure Options: AM-PAC 6 Clicks Basic Mobility (PT)  AM-PAC 6 Clicks Score: 11    Lm Hernandez PTA  3/19/2019        normal...

## 2019-03-19 NOTE — PROGRESS NOTES
Continued Stay Note   [facility]     Patient Name: Gerson Wilhelm  MRN: [id number]  Today's Date: 3/19/2019    Admit Date: 3/11/2019    Discharge Plan     Row Name 03/19/19 1042       Plan    Plan  Home vs rehab?    Plan Comments  Patient has orders to move to floor.  Note from APRN indicate rehab placement was discussed.  Patient is hoping to progress with therapy enough that he can return home upon discharge.  Swing bed may be an option as well as patient resides in [facility].  SW will continue to follow and work toward disposition.        Discharge Codes    No documentation.             MARION Zhou

## 2019-03-19 NOTE — THERAPY TREATMENT NOTE
Acute Care - Physical Therapy Treatment Note  [facility]     Patient Name: Gersno Wilhelm  : 1945  MRN: [id number]  Today's Date: 3/19/2019  Onset of Illness/Injury or Date of Surgery: 19  Date of Referral to PT: 19  Referring Physician: Dr Orona    Admit Date: 3/11/2019    Visit Dx:    ICD-10-CM ICD-9-CM   1. ENNIS (dyspnea on exertion) R06.09 786.09   2. Impaired mobility Z74.09 799.89   3. Coronary artery disease involving native coronary artery of native heart without angina pectoris I25.10 414.01     Patient Active Problem List   Diagnosis   • ENNIS (dyspnea on exertion)   • Coronary artery disease involving native coronary artery of native heart without angina pectoris   • History of stroke   • Type 2 diabetes mellitus with circulatory disorder, with long-term current use of insulin (CMS/[facility])   • Essential hypertension   • Smokeless tobacco use       Therapy Treatment    Rehabilitation Treatment Summary     Row Name 19             Treatment Time/Intention    Discipline  physical therapy assistant  -LG      Document Type  therapy note (daily note)  -LG      Subjective Information  no complaints  -LG      Mode of Treatment  individual therapy;physical therapy  -LG      Existing Precautions/Restrictions  fall;sternal  -LG      Recorded by [LG] Lm Hernandez, PTA 1956      Row Name 19             Vital Signs    Pre Systolic BP Rehab  153  -LG      Pre Treatment Diastolic BP  95  -LG      Post Systolic BP Rehab  145  -LG      Post Treatment Diastolic BP  78  -LG      Pretreatment Heart Rate (beats/min)  91  -LG      Posttreatment Heart Rate (beats/min)  89  -LG      Pre SpO2 (%)  93  -LG      O2 Delivery Pre Treatment  room air  -LG      Post SpO2 (%)  94  -LG      O2 Delivery Post Treatment  room air  -LG      Recorded by [LG] Lm Hernandez, PTA 1955      Row Name 19             Bed Mobility Assessment/Treatment    Comment (Bed Mobility)  up in  chair  -LG      Recorded by [LG] Lm Hernandez, PTA 03/19/19 0956      Row Name 03/19/19 0902             Sit-Stand Transfer    Sit-Stand [place] (Transfers)  verbal cues;minimum assist (75% patient effort);moderate assist (50% patient effort);2 person assist  -LG      Recorded by [LG] Lm Hernandez, PTA 03/19/19 0956      Row Name 03/19/19 0902             Stand-Sit Transfer    Stand-Sit [place] (Transfers)  verbal cues;minimum assist (75% patient effort);2 person assist  -LG      Recorded by [LG] Lm Hernandez, PTA 03/19/19 0956      Row Name 03/19/19 0902             Gait/Stairs Assessment/Training    Gait/Stairs Assessment/Training  distance ambulated  -LG      [place] Level (Gait)  minimum assist (75% patient effort);2 person assist  -LG      Assistive Device (Gait)  -- HHA x 2  -LG      Distance in Feet (Gait)  200  -LG      Pattern (Gait)  step-to  -LG      Recorded by [LG] Lm Hernandez, PTA 03/19/19 0956      Row Name 03/19/19 0902             Therapeutic Exercise    Comment (Therapeutic Exercise)  Cardiac Protocol  -LG      Recorded by [LG] Lm Hernandez, PTA 03/19/19 0956      Row Name 03/19/19 0902             Positioning and Restraints    Pre-Treatment Position  sitting in chair/recliner  -LG      Post Treatment Position  chair  -LG      In Chair  sitting;call light within reach;encouraged to call for assist;notified nsg  -LG      Recorded by [LG] Lm Hernandez, PTA 03/19/19 0956      Row Name 03/19/19 0902             Pain Scale: Numbers Pre/Post-Treatment    Pain Scale: Numbers, Pretreatment  0/10 - no pain  -LG      Pain Scale: Numbers, Post-Treatment  0/10 - no pain  -LG      Recorded by [LG] Lm Hernandez, PTA 03/19/19 0956      Row Name                Wound 03/14/19 0856 Other (See comments) chest incision    Wound - Properties Group Date first assessed: 03/14/19 [DS] Time first assessed: 0856 [DS] Side: Other (See comments) [DS] Location: chest [DS] Type: incision [DS] Recorded by:   [DS] Bert Isaac RN 03/14/19 0856    Row Name                Wound 03/14/19 0856 Left leg incision    Wound - Properties Group Date first assessed: 03/14/19 [DS] Time first assessed: 0856 [DS] Side: Left [DS] Location: leg [DS] Type: incision [DS] Recorded by:  [DS] Bert Isaac RN 03/14/19 0856      User Key  (r) = Recorded By, (t) = Taken By, (c) = Cosigned By    Initials Name Effective Dates Discipline    LG Lm Hernandez, PTA 08/02/16 -  PT    DS Bert Isaac RN 08/02/16 -  Nurse          Wound 03/14/19 0856 Other (See comments) chest incision (Active)   Dressing Appearance open to air 3/19/2019  4:05 AM   Closure Liquid skin adhesive 3/19/2019  4:05 AM   Base clean;dry 3/19/2019  4:05 AM   Care, Wound cleansed with;soap and water 3/18/2019  8:00 PM       Wound 03/14/19 0856 Left leg incision (Active)   Dressing Appearance open to air 3/19/2019  4:05 AM   Closure Liquid skin adhesive 3/19/2019  4:05 AM   Base dry;clean 3/19/2019  4:05 AM   Care, Wound cleansed with;soap and water 3/18/2019  8:00 PM       Wound midline;lower chest puncture (Active)   Dressing Appearance dry;intact 3/19/2019  4:05 AM   Closure RICHIE 3/19/2019  4:05 AM   Base dressing in place, unable to visualize 3/19/2019  4:05 AM   Periwound intact 3/19/2019  4:05 AM   Care, Wound cleansed with;soap and water 3/18/2019  8:00 PM   Dressing Care, Wound dressing changed;gauze 3/18/2019  8:00 PM           Physical Therapy Education     Title: PT OT SLP Therapies (In Progress)     Topic: Physical Therapy (In Progress)     Point: Mobility training (In Progress)     Learning Progress Summary           Patient Acceptance, E, NR by DARREN at 3/15/2019  8:15 AM    Comment:  Educated pt. on progression of PT POC, benefits of activity, sternal precautions.    Acceptance, E, VU,DU,NR by DARREN at 3/13/2019  2:30 PM    Comment:  Educated pt/spouse on progression of PT POC and benefits of activity. Answered pre questions about open heart surgery being  performed tomorrow and educated on sternal precautions.   Significant Other Acceptance, E, VU,DU,NR by DARREN at 3/13/2019  2:30 PM    Comment:  Educated pt/spouse on progression of PT POC and benefits of activity. Answered pre questions about open heart surgery being performed tomorrow and educated on sternal precautions.                   Point: Precautions (In Progress)     Learning Progress Summary           Patient Acceptance, E, NR by DARREN at 3/15/2019  8:15 AM    Comment:  Educated pt. on progression of PT POC, benefits of activity, sternal precautions.                               User Key     Initials Effective Dates Name Provider Type Discipline    DARREN 08/02/16 -  Dinh Melgar PT DPT Physical Therapist PT                PT Recommendation and Plan     Plan of Care Reviewed With: patient  Progress: improving  Outcome Summary: Pt up in chair. Min-Mod x 2 sit to stand, Min x 2 Gait x 200'.      Time Calculation:   PT Charges     Row Name 03/19/19 0902             Time Calculation    Start Time  0902  -      Stop Time  0940  -      Time Calculation (min)  38 min  -      PT Received On  03/19/19  -      PT Goal Re-Cert Due Date  03/25/19  -         Time Calculation- PT    Total Timed Code Minutes- PT  38 minute(s)  -LG        User Key  (r) = Recorded By, (t) = Taken By, (c) = Cosigned By    Initials Name Provider Type    LG Lm Hernandez PTA Physical Therapy Assistant        Therapy Charges for Today     Code Description Service Date Service Provider Modifiers Qty    53281514044 HC GAIT TRAINING EA 15 MIN 3/18/2019 Lm Hernandez, PTA GP 1    91839146830 HC PT THER PROC EA 15 MIN 3/18/2019 Lm Hernandez, PTA GP 1    40764156656 HC GAIT TRAINING EA 15 MIN 3/19/2019 Lm Hernandez, PTA GP 1    82952602982 HC PT THERAPEUTIC ACT EA 15 MIN 3/19/2019 Lm Hernandez, PTA GP 1    01889264436 HC PT THER PROC EA 15 MIN 3/19/2019 Lm Hernandez, WILLIAM GP 1          PT G-Codes  Outcome Measure Options: AM-PAC 6 Clicks  Basic Mobility (PT)  AM-PAC 6 Clicks Score: 11    Lm Hernandez, PTA  3/19/2019

## 2019-03-19 NOTE — PROGRESS NOTES
"CABG x 2, Right leg open vein harvest, placement of right femoral arterial line    POD 5    Up in the chair. More awake today. On room air. IS 1250! Walked 200 feet with assist x 2 with PT. (+) BM. Atrial fibrillation overnight and amiodarone gtt restarted. Telemetry: NSR 99    Visit Vitals  /82   Pulse 93   Temp 98.4 °F (36.9 °C) (Axillary)   Resp 12   Ht 172.7 cm (68\")   Wt 96.1 kg (211 lb 12.8 oz)   SpO2 100%   BMI 32.20 kg/m²   Baseline weight: 194  RA    Intake/Output Summary (Last 24 hours) at 3/19/2019 1001  Last data filed at 3/19/2019 0739  Gross per 24 hour   Intake 380.4 ml   Output 715 ml   Net -334.6 ml     Josias drain: 125 ml    Labs:      Physical Exam:  General: No apparent distress. Up in the chair.  Cardiovascular: Regular rate and rhythm without murmur, rubs, or gallops.    Pulmonary: Clear to auscultation bilaterally without wheezing, rubs, or rales.  Chest: Sternotomy incision clean, dry, and intact. Sternum stable. No clicks. Josias drain x 1 clean, dry, and intact with serosanguinous output.   Abdomen: Soft, non-distended, and non-tender.  Extremities: Warm, moves all extremities. Saphenectomy site clean, dry, and intact   Neurologic: Grossly intact with no focal deficits.    Impression:  Coronary artery disease status post CABG   Left ventricular dysfuction  Type 2 diabetes mellitus  Hypertension  History of stroke  Restrictive lung disease  Hiatal hernia, large  Physical debility  Paroxysmal atrial fibrillation, currently NSR    Plan:  Increase BB and BP control. Monitor telemetry.  Continue to encourage oral intake  Continue to push pulmonary toilet and ambulation. Discussed patient may need discharge to rehab center.   Resume lovenox  Continue routine post cardiac surgery regimen  Transfer to   "

## 2019-03-20 ENCOUNTER — APPOINTMENT (OUTPATIENT)
Dept: GENERAL RADIOLOGY | Facility: HOSPITAL | Age: 74
End: 2019-03-20

## 2019-03-20 LAB
ANION GAP SERPL CALCULATED.3IONS-SCNC: 10 MMOL/L (ref 4–13)
BUN BLD-MCNC: 24 MG/DL (ref 5–21)
BUN/CREAT SERPL: 23.1 (ref 7–25)
CALCIUM SPEC-SCNC: 8.6 MG/DL (ref 8.4–10.4)
CHLORIDE SERPL-SCNC: 111 MMOL/L (ref 98–110)
CO2 SERPL-SCNC: 23 MMOL/L (ref 24–31)
CREAT BLD-MCNC: 1.04 MG/DL (ref 0.5–1.4)
DEPRECATED RDW RBC AUTO: 51.1 FL (ref 40–54)
ERYTHROCYTE [DISTWIDTH] IN BLOOD BY AUTOMATED COUNT: 15.6 % (ref 12–15)
GFR SERPL CREATININE-BSD FRML MDRD: 70 ML/MIN/1.73
GLUCOSE BLD-MCNC: 170 MG/DL (ref 70–100)
GLUCOSE BLDC GLUCOMTR-MCNC: 165 MG/DL (ref 70–130)
GLUCOSE BLDC GLUCOMTR-MCNC: 202 MG/DL (ref 70–130)
GLUCOSE BLDC GLUCOMTR-MCNC: 209 MG/DL (ref 70–130)
GLUCOSE BLDC GLUCOMTR-MCNC: 99 MG/DL (ref 70–130)
HCT VFR BLD AUTO: 30.9 % (ref 40–52)
HGB BLD-MCNC: 9.9 G/DL (ref 14–18)
MCH RBC QN AUTO: 29.2 PG (ref 28–32)
MCHC RBC AUTO-ENTMCNC: 32 G/DL (ref 33–36)
MCV RBC AUTO: 91.2 FL (ref 82–95)
PLATELET # BLD AUTO: 182 10*3/MM3 (ref 130–400)
PMV BLD AUTO: 10.1 FL (ref 6–12)
POTASSIUM BLD-SCNC: 4.4 MMOL/L (ref 3.5–5.3)
RBC # BLD AUTO: 3.39 10*6/MM3 (ref 4.8–5.9)
SODIUM BLD-SCNC: 144 MMOL/L (ref 135–145)
WBC NRBC COR # BLD: 5.19 10*3/MM3 (ref 4.8–10.8)

## 2019-03-20 PROCEDURE — 63710000001 INSULIN LISPRO (HUMAN) PER 5 UNITS: Performed by: THORACIC SURGERY (CARDIOTHORACIC VASCULAR SURGERY)

## 2019-03-20 PROCEDURE — 80048 BASIC METABOLIC PNL TOTAL CA: CPT | Performed by: NURSE PRACTITIONER

## 2019-03-20 PROCEDURE — 97530 THERAPEUTIC ACTIVITIES: CPT

## 2019-03-20 PROCEDURE — 71046 X-RAY EXAM CHEST 2 VIEWS: CPT

## 2019-03-20 PROCEDURE — 25010000002 ENOXAPARIN PER 10 MG: Performed by: NURSE PRACTITIONER

## 2019-03-20 PROCEDURE — 94799 UNLISTED PULMONARY SVC/PX: CPT

## 2019-03-20 PROCEDURE — 97116 GAIT TRAINING THERAPY: CPT

## 2019-03-20 PROCEDURE — 94760 N-INVAS EAR/PLS OXIMETRY 1: CPT

## 2019-03-20 PROCEDURE — 85027 COMPLETE CBC AUTOMATED: CPT | Performed by: NURSE PRACTITIONER

## 2019-03-20 PROCEDURE — 99024 POSTOP FOLLOW-UP VISIT: CPT | Performed by: NURSE PRACTITIONER

## 2019-03-20 PROCEDURE — 82962 GLUCOSE BLOOD TEST: CPT

## 2019-03-20 PROCEDURE — 97110 THERAPEUTIC EXERCISES: CPT

## 2019-03-20 RX ORDER — IPRATROPIUM BROMIDE AND ALBUTEROL SULFATE 2.5; .5 MG/3ML; MG/3ML
3 SOLUTION RESPIRATORY (INHALATION)
Status: DISCONTINUED | OUTPATIENT
Start: 2019-03-20 | End: 2019-03-22 | Stop reason: HOSPADM

## 2019-03-20 RX ORDER — METOPROLOL TARTRATE 50 MG/1
50 TABLET, FILM COATED ORAL EVERY 12 HOURS SCHEDULED
Status: DISCONTINUED | OUTPATIENT
Start: 2019-03-20 | End: 2019-03-22 | Stop reason: HOSPADM

## 2019-03-20 RX ADMIN — ATORVASTATIN CALCIUM 20 MG: 10 TABLET, FILM COATED ORAL at 21:13

## 2019-03-20 RX ADMIN — ENOXAPARIN SODIUM 30 MG: 30 INJECTION SUBCUTANEOUS at 21:14

## 2019-03-20 RX ADMIN — OXYCODONE AND ACETAMINOPHEN 1 TABLET: 5; 325 TABLET ORAL at 21:13

## 2019-03-20 RX ADMIN — INSULIN LISPRO 4 UNITS: 100 INJECTION, SOLUTION INTRAVENOUS; SUBCUTANEOUS at 09:23

## 2019-03-20 RX ADMIN — AMIODARONE HYDROCHLORIDE 400 MG: 200 TABLET ORAL at 16:41

## 2019-03-20 RX ADMIN — AMIODARONE HYDROCHLORIDE 400 MG: 200 TABLET ORAL at 09:24

## 2019-03-20 RX ADMIN — ASPIRIN 81 MG: 81 TABLET, DELAYED RELEASE ORAL at 09:25

## 2019-03-20 RX ADMIN — METFORMIN HYDROCHLORIDE 1000 MG: 500 TABLET ORAL at 12:45

## 2019-03-20 RX ADMIN — POTASSIUM CHLORIDE 20 MEQ: 750 CAPSULE, EXTENDED RELEASE ORAL at 17:10

## 2019-03-20 RX ADMIN — METFORMIN HYDROCHLORIDE 1000 MG: 500 TABLET ORAL at 17:10

## 2019-03-20 RX ADMIN — GLIPIZIDE 2.5 MG: 5 TABLET ORAL at 09:24

## 2019-03-20 RX ADMIN — INSULIN LISPRO 2 UNITS: 100 INJECTION, SOLUTION INTRAVENOUS; SUBCUTANEOUS at 12:54

## 2019-03-20 RX ADMIN — METOPROLOL TARTRATE 50 MG: 50 TABLET ORAL at 21:13

## 2019-03-20 RX ADMIN — CLOPIDOGREL 75 MG: 75 TABLET, FILM COATED ORAL at 17:11

## 2019-03-20 RX ADMIN — AMIODARONE HYDROCHLORIDE 400 MG: 200 TABLET ORAL at 21:13

## 2019-03-20 RX ADMIN — PANTOPRAZOLE SODIUM 40 MG: 40 TABLET, DELAYED RELEASE ORAL at 05:41

## 2019-03-20 RX ADMIN — IPRATROPIUM BROMIDE AND ALBUTEROL SULFATE 3 ML: 2.5; .5 SOLUTION RESPIRATORY (INHALATION) at 06:43

## 2019-03-20 RX ADMIN — LIDOCAINE 2 PATCH: 50 PATCH CUTANEOUS at 09:24

## 2019-03-20 RX ADMIN — RAMIPRIL 2.5 MG: 2.5 CAPSULE ORAL at 09:25

## 2019-03-20 RX ADMIN — POTASSIUM CHLORIDE 20 MEQ: 750 CAPSULE, EXTENDED RELEASE ORAL at 09:24

## 2019-03-20 RX ADMIN — ESCITSLOPRAM 5 MG: 10 TABLET ORAL at 21:13

## 2019-03-20 RX ADMIN — OXYCODONE AND ACETAMINOPHEN 1 TABLET: 5; 325 TABLET ORAL at 13:21

## 2019-03-20 RX ADMIN — GLIPIZIDE 2.5 MG: 5 TABLET ORAL at 16:41

## 2019-03-20 RX ADMIN — METOPROLOL TARTRATE 50 MG: 50 TABLET ORAL at 09:23

## 2019-03-20 RX ADMIN — INSULIN LISPRO 4 UNITS: 100 INJECTION, SOLUTION INTRAVENOUS; SUBCUTANEOUS at 21:14

## 2019-03-20 RX ADMIN — IPRATROPIUM BROMIDE AND ALBUTEROL SULFATE 3 ML: 2.5; .5 SOLUTION RESPIRATORY (INHALATION) at 21:21

## 2019-03-20 RX ADMIN — ENOXAPARIN SODIUM 30 MG: 30 INJECTION SUBCUTANEOUS at 09:23

## 2019-03-20 RX ADMIN — OXYCODONE AND ACETAMINOPHEN 1 TABLET: 5; 325 TABLET ORAL at 03:57

## 2019-03-20 NOTE — PLAN OF CARE
Problem: Patient Care Overview  Goal: Plan of Care Review  Outcome: Ongoing (interventions implemented as appropriate)   03/20/19 9990   Coping/Psychosocial   Plan of Care Reviewed With patient;spouse   Plan of Care Review   Progress improving   OTHER   Outcome Summary Pt remians very impulsive and does not recognize the serioiusness of falling. I walked in on chair alarm sounding, pt had gotten himself up out of one chair and walked himself across the room to another chair with a blanket wrapped around his legs/feet. Pt stronglly educated on safety and asking for assistance before walking. Pt is Min-Mod sit to stand, he is very unstable on his feet and reaches out for doorways and objects in [place].. Pt is Min A for Gait of 25' before having to stop due to fatigue and loss of balance. He scissors his feet and tries to reach out and lean against wall. Pt and pt's wife educated on safety and the impact a fall will have on his recovery. I strongly suggested short term rehab due to impaired balance, strength and decreased safety choices. Pt 's wife states she can stay home with him for short while but cannot do extended care. Wife was receptive to [facility] Swing Bed or [facility] Rehab ONLY.

## 2019-03-20 NOTE — PLAN OF CARE
Problem: Patient Care Overview  Goal: Plan of Care Review  Outcome: Ongoing (interventions implemented as appropriate)   03/19/19 5042   Coping/Psychosocial   Plan of Care Reviewed With patient   Plan of Care Review   Progress improving   OTHER   Outcome Summary Patient transferred out of icu to floor today. Has had no complaints of pain. Patient is alert and oriented x4 but can be forgetful. Patient has wanted to go to bed since getting to floor. Encouraged patient to stay up to get all 4 walks in and to be sitting up for supper. Patient agreed and went back to bed after his last walk at 7pm. Patient has very poor appetite but did drink all of glucerna with alot of encouragement.

## 2019-03-20 NOTE — PLAN OF CARE
Problem: Patient Care Overview  Goal: Plan of Care Review  Outcome: Ongoing (interventions implemented as appropriate)   03/20/19 0426   Coping/Psychosocial   Plan of Care Reviewed With patient;spouse   Plan of Care Review   Progress improving   OTHER   Outcome Summary VSS. Complaint of pain X2, has been up in chair for most of the night. Complained of severe flank pain early on. Prn Percocet and getting out of bed back to chair helped. Patient had multiple loose stools. Can be confused at times easily redirected.       Problem: Fall Risk (Adult)  Goal: Absence of Fall  Outcome: Outcome(s) achieved Date Met: 03/20/19 03/20/19 0426   Fall Risk (Adult)   Absence of Fall achieves outcome       Problem: Skin Injury Risk (Adult)  Goal: Identify Related Risk Factors and Signs and Symptoms  Outcome: Ongoing (interventions implemented as appropriate)   03/20/19 0426   Skin Injury Risk (Adult)   Related Risk Factors (Skin Injury Risk) mobility impaired     Goal: Skin Health and Integrity  Outcome: Ongoing (interventions implemented as appropriate)   03/20/19 0426   Skin Injury Risk (Adult)   Skin Health and Integrity making progress toward outcome       Problem: Cardiac Surgery (Adult)  Goal: Signs and Symptoms of Listed Potential Problems Will be Absent, Minimized or Managed (Cardiac Surgery)  Outcome: Ongoing (interventions implemented as appropriate)   03/20/19 0426   Goal/Outcome Evaluation   Problems Assessed (Cardiac Surgery) bowel motility decreased;situational response   Problems Present (Cardiac Surgery) bowel motility decreased;situational response

## 2019-03-20 NOTE — PROGRESS NOTES
Continued Stay Note   [facility]     Patient Name: Gerson Wilhelm  MRN: [id number]  Today's Date: 3/20/2019    Admit Date: 3/11/2019    Discharge Plan     Row Name 03/20/19 1007       Plan    Plan  Pt walking 200 ft.  Spouse and pt requesting [place] .  SW will follow for orders.  MARION Gregory.    Patient/Family in Agreement with Plan  yes        Discharge Codes    No documentation.             MARION Andre

## 2019-03-20 NOTE — PLAN OF CARE
Problem: Patient Care Overview  Goal: Plan of Care Review  Outcome: Ongoing (interventions implemented as appropriate)   03/20/19 0684   Coping/Psychosocial   Plan of Care Reviewed With patient;spouse   Plan of Care Review   Progress improving   OTHER   Outcome Summary RD nutrition assessment completed. Pt's po intake of supplments and diet averages about 50%. Pt states that he doesn't like Boost Glucose control, although he has consumed 100% of it twice in the past few days. Provided copy of General Heart Healthy Diet information for pt and wife to review when pt's pain subsides. RD will return tomorrow to answer diet questions.

## 2019-03-20 NOTE — PROGRESS NOTES
Continued Stay Note  [facility]     Patient Name: Gerson Wilhelm  MRN: [id number]  Today's Date: 3/20/2019    Admit Date: 3/11/2019    Discharge Plan     Row Name 03/20/19 5531       Plan    Plan  Adele from [facility] called to offer a bed on pt.  She stated if pt is medically ready pt can dc tomorrow.  Adele [phone].  Pt and spouse aware of bed offer.  They are still trying to decide hh vs swing bed.  RN and PTA are recommending rehab.  SW will check in with pt in AM to see what they have decided.  MARION Gregory.    Patient/Family in Agreement with Plan  yes    Row Name 03/20/19 1410       Plan    Plan  Lm THOMAS contacted DEVYN and stating that he is recommending rehab placement and that family wants [facility].  DEVYN verified with pt that he does want referral made.  He agreed.  DEVYN has faxed referral and will follow for bed offer.  MARION Gregory.     Patient/Family in Agreement with Plan  yes        Discharge Codes    No documentation.             MARION Andre

## 2019-03-20 NOTE — PROGRESS NOTES
Continued Stay Note   [place]     Patient Name: Gerson Wilhelm  MRN: [id number]  Today's Date: 3/20/2019    Admit Date: 3/11/2019    Discharge Plan     Row Name 03/20/19 1410       Plan    Plan  Lm THOMAS contacted DEVYN and stating that he is recommending rehab placement and that family wants [place] [facility].  SW verified with pt that he does want referral made.  He agreed.  SW has faxed referral and will follow for bed offer.  MARION Gregory.     Patient/Family in Agreement with Plan  yes        Discharge Codes    No documentation.             MARION Andre

## 2019-03-20 NOTE — PLAN OF CARE
Problem: Patient Care Overview  Goal: Plan of Care Review  Outcome: Ongoing (interventions implemented as appropriate)   03/20/19 1322   Coping/Psychosocial   Plan of Care Reviewed With patient   Plan of Care Review   Progress improving   OTHER   Outcome Summary Pt was up in chair and wife assisted from sit to stand cga-min x1 safely. Pt walked 180ft with wife cga-min [id number]. Pt took a min sitting rest. Pt was irritable during walk and stated he was anxious to go home.Pt then stood again and walked to stairwell and was warned about step down but still had a LOB. Pt attempted steps but was unable to perform safely. Pt was told we would practice this again this PM. As of now pt would benefit from continued rehab but will re- acess this PM.

## 2019-03-20 NOTE — PROGRESS NOTES
"CABG x 2, Right leg open vein harvest, placement of right femoral arterial line    POD 6    Up in the chair. On room air. Walking 200 feet with PT. Poor appetite but drinking glucerna shakes.  (+) BM. Telemetry: NSR 86-98, no afib. Nursing reports intermittent confusion.     Visit Vitals  /75 (BP Location: Right arm, Patient Position: Sitting)   Pulse 92   Temp 98.8 °F (37.1 °C) (Oral)   Resp 18   Ht 172.7 cm (67.99\")   Wt 92.8 kg (204 lb 9 oz)   SpO2 94%   BMI 31.11 kg/m²   Baseline weight: 194  RA    Intake/Output Summary (Last 24 hours) at 3/20/2019 1042  Last data filed at 3/20/2019 0901  Gross per 24 hour   Intake 1102.2 ml   Output 105 ml   Net 997.2 ml     Caden drain: 105 ml    Labs:      Chest x ray: no pneumothorax, improving bibasilar atelectasis, large hiatal hernia noted in right chest    Physical Exam:  General: No apparent distress. Up in the chair.  Cardiovascular: Regular rate and rhythm without murmur, rubs, or gallops.    Pulmonary: Clear to auscultation bilaterally without wheezing, rubs, or rales.  Chest: Sternotomy incision clean, dry, and intact. Sternum stable. No clicks. Caden drain x 1 clean, dry, and intact with serosanguinous output.   Abdomen: Soft, non-distended, and non-tender.  Extremities: Warm, moves all extremities. Saphenectomy site clean, dry, and intact   Neurologic: Grossly intact with no focal deficits.    Impression:  Coronary artery disease status post CABG   Left ventricular dysfuction  Type 2 diabetes mellitus  Hypertension  History of stroke  Restrictive lung disease  Hiatal hernia, large  Physical debility  Paroxysmal atrial fibrillation, currently NSR    Plan:  Increase BB   Continue to encourage oral intake  Continue to push pulmonary toilet and ambulation  Continue caden drain  Continue routine post cardiac surgery regimen  DC soon, likely home with home health   "

## 2019-03-20 NOTE — THERAPY TREATMENT NOTE
Acute Care - Physical Therapy Treatment Note  [facility]     Patient Name: Gerson Wilhelm  : 1945  MRN: [id number]  Today's Date: 3/20/2019  Onset of Illness/Injury or Date of Surgery: 19  Date of Referral to PT: 19  Referring Physician: Dr Orona    Admit Date: 3/11/2019    Visit Dx:    ICD-10-CM ICD-9-CM   1. ENNIS (dyspnea on exertion) R06.09 786.09   2. Impaired mobility Z74.09 799.89   3. Coronary artery disease involving native coronary artery of native heart without angina pectoris I25.10 414.01     Patient Active Problem List   Diagnosis   • ENNIS (dyspnea on exertion)   • Coronary artery disease involving native coronary artery of native heart without angina pectoris   • History of stroke   • Type 2 diabetes mellitus with circulatory disorder, with long-term current use of insulin (CMS/HCC)   • Essential hypertension   • Smokeless tobacco use   • Paroxysmal atrial fibrillation (CMS/HCC)   • Chronic systolic heart failure (CMS/HCC)   • Restrictive lung disease   • Hiatal hernia   • Obesity (BMI 30-39.9)       Therapy Treatment    Rehabilitation Treatment Summary     Row Name 19 1048             Treatment Time/Intention    Discipline  physical therapy assistant  -AE      Document Type  therapy note (daily note)  -AE      Subjective Information  no complaints  -AE      Existing Precautions/Restrictions  fall;sternal  -AE      Treatment Considerations/Comments  -- Pt seemed very irritable today  -AE      Recorded by [AE] Vanessa Jordan PTA 19 1322      Row Name 19 1048             Vital Signs    Pre SpO2 (%)  93  -AE      O2 Delivery Pre Treatment  room air  -AE      Post SpO2 (%)  99  -AE      O2 Delivery Post Treatment  room air  -AE      Recorded by [AE] Vanessa Jodran PTA 19 1322      Row Name 19 1048             Bed Mobility Assessment/Treatment    Comment (Bed Mobility)  up in chair  -AE      Recorded by [AE] Vanessa Jordan PTA 19 1322      Row  Name 03/20/19 1048             Sit-Stand Transfer    Sit-Stand [place] (Transfers)  contact guard;verbal cues wife assited  -AE      Recorded by [AE] Vanessa Jordan PTA 03/20/19 1322      Row Name 03/20/19 1048             Stand-Sit Transfer    Stand-Sit [place] (Transfers)  contact guard;verbal cues wife assited  -AE      Recorded by [AE] Vanessa Jordan, WILLIAM 03/20/19 1322      Row Name 03/20/19 1048             Gait/Stairs Assessment/Training    [place] Level (Gait)  contact guard;minimum assist (75% patient effort)  -AE      Distance in Feet (Gait)  200  -AE      Deviations/Abnormal Patterns (Gait)  stride length decreased  -AE      Bilateral Gait Deviations  forward flexed posture  -AE      Handrail Location (Stairs)  -- practiced steps in stairwell  -AE      Number of Steps (Stairs)  -- Attempted to try steps but pt was unable to safely perform   -AE      Comment (Gait/Stairs)  x 1 LOB going down small step.  -AE      Recorded by [AE] Vanessa Jordan PTA 03/20/19 1322      Row Name 03/20/19 1048             Positioning and Restraints    Pre-Treatment Position  sitting in chair/recliner  -AE      Post Treatment Position  chair  -AE      In Chair  sitting;call light within reach  -AE      Recorded by [AE] Vanessa Jordan PTA 03/20/19 1322      Row Name 03/20/19 1048             Pain Scale: Numbers Pre/Post-Treatment    Pain Scale: Numbers, Pretreatment  0/10 - no pain  -AE      Pain Scale: Numbers, Post-Treatment  0/10 - no pain  -AE      Recorded by [AE] Vanessa Jordan PTA 03/20/19 1322      Row Name                Wound 03/14/19 0856 Other (See comments) chest incision    Wound - Properties Group Date first assessed: 03/14/19 [DS] Time first assessed: 0856 [DS] Side: Other (See comments) [DS] Location: chest [DS] Type: incision [DS] Recorded by:  [DS] Bert Isaac RN 03/14/19 0856    Row Name                Wound 03/14/19 0856 Left leg incision    Wound - Properties Group Date first  assessed: 03/14/19 [DS] Time first assessed: 0856 [DS] Side: Left [DS] Location: leg [DS] Type: incision [DS] Recorded by:  [DS] Bert Isaac RN 03/14/19 0856      User Key  (r) = Recorded By, (t) = Taken By, (c) = Cosigned By    Initials Name Effective Dates Discipline    AE Vanessa Jordan, PTA 06/22/15 -  PT    DS Bert Isaac RN 08/02/16 -  Nurse          Wound 03/14/19 0856 Other (See comments) chest incision (Active)   Dressing Appearance open to air 3/20/2019  7:50 AM   Closure Approximated 3/20/2019  7:50 AM   Base dry;clean 3/20/2019  7:50 AM   Drainage Amount none 3/20/2019  7:50 AM   Dressing Care, Wound open to air 3/19/2019  7:32 PM       Wound 03/14/19 0856 Left leg incision (Active)   Dressing Appearance open to air 3/20/2019  7:50 AM   Closure Approximated 3/20/2019  7:50 AM   Base dry;clean 3/20/2019  7:50 AM   Drainage Amount none 3/20/2019  7:50 AM   Dressing Care, Wound open to air 3/20/2019  7:50 AM       Wound midline;lower chest puncture (Active)   Dressing Appearance dry;intact 3/20/2019  7:50 AM   Closure RICHIE 3/20/2019  7:50 AM   Base dressing in place, unable to visualize 3/20/2019  7:50 AM   Drainage Amount none 3/20/2019  7:50 AM           Physical Therapy Education     Title: PT OT SLP Therapies (In Progress)     Topic: Physical Therapy (In Progress)     Point: Mobility training (In Progress)     Learning Progress Summary           Patient Acceptance, E, NR by DARREN at 3/15/2019  8:15 AM    Comment:  Educated pt. on progression of PT POC, benefits of activity, sternal precautions.    Acceptance, E, VU,DU,NR by DARREN at 3/13/2019  2:30 PM    Comment:  Educated pt/spouse on progression of PT POC and benefits of activity. Answered pre questions about open heart surgery being performed tomorrow and educated on sternal precautions.   Significant Other Acceptance, E, VU,DU,NR by DARREN at 3/13/2019  2:30 PM    Comment:  Educated pt/spouse on progression of PT POC and benefits of activity.  Answered pre questions about open heart surgery being performed tomorrow and educated on sternal precautions.                   Point: Precautions (In Progress)     Learning Progress Summary           Patient Acceptance, E, NR by DARREN at 3/15/2019  8:15 AM    Comment:  Educated pt. on progression of PT POC, benefits of activity, sternal precautions.                               User Key     Initials Effective Dates Name Provider Type Discipline    DARREN 08/02/16 -  Dinh Melgar, PT DPT Physical Therapist PT                PT Recommendation and Plan     Plan of Care Reviewed With: patient  Progress: improving  Outcome Summary: Pt was up in chair and wife assisted from sit to stand cga-min x1 safely. Pt walked 180ft with wife     Time Calculation:   PT Charges     Row Name 03/20/19 1330             Time Calculation    Start Time  1048  -AE      Stop Time  1113  -AE      Time Calculation (min)  25 min  -AE      PT Received On  03/20/19  -AE      PT Goal Re-Cert Due Date  03/25/19  -AE         Time Calculation- PT    Total Timed Code Minutes- PT  25 minute(s)  -AE         Timed Charges    98788 - Gait Training Minutes   25  -AE        User Key  (r) = Recorded By, (t) = Taken By, (c) = Cosigned By    Initials Name Provider Type    AE Vanessa Jordan PTA Physical Therapy Assistant        Therapy Charges for Today     Code Description Service Date Service Provider Modifiers Qty    72708972673 HC GAIT TRAINING EA 15 MIN 3/20/2019 Vanessa Jordan PTA GP 2          PT G-Codes  Outcome Measure Options: AM-PAC 6 Clicks Basic Mobility (PT)  AM-PAC 6 Clicks Score: 11    Vanessa Jordan PTA  3/20/2019

## 2019-03-20 NOTE — THERAPY TREATMENT NOTE
Acute Care - Physical Therapy Treatment Note  [facility]     Patient Name: Gerson Wilhelm  : 1945  MRN: [id number]  Today's Date: 3/20/2019  Onset of Illness/Injury or Date of Surgery: 19  Date of Referral to PT: 19  Referring Physician: Dr Orona    Admit Date: 3/11/2019    Visit Dx:    ICD-10-CM ICD-9-CM   1. ENNIS (dyspnea on exertion) R06.09 786.09   2. Impaired mobility Z74.09 799.89   3. Coronary artery disease involving native coronary artery of native heart without angina pectoris I25.10 414.01     Patient Active Problem List   Diagnosis   • ENNIS (dyspnea on exertion)   • Coronary artery disease involving native coronary artery of native heart without angina pectoris   • History of stroke   • Type 2 diabetes mellitus with circulatory disorder, with long-term current use of insulin (CMS/HCC)   • Essential hypertension   • Smokeless tobacco use   • Paroxysmal atrial fibrillation (CMS/HCC)   • Chronic systolic heart failure (CMS/HCC)   • Restrictive lung disease   • Hiatal hernia   • Obesity (BMI 30-39.9)       Therapy Treatment    Rehabilitation Treatment Summary     Row Name 19 1308 19 1048          Treatment Time/Intention    Discipline  physical therapy assistant  -LG  physical therapy assistant  -AE     Document Type  therapy note (daily note)  -LG  therapy note (daily note)  -AE     Subjective Information  complains of;pain  -LG  no complaints  -AE     Mode of Treatment  individual therapy;physical therapy  -LG  --     Patient/Family Observations  wife present at end of treatment  -LG  --     Comment  Responded to chair alarm. Pt had gotten himself up from chair and walked across floor to another chair. Pt strongly educated on fall risks and advised to call and ask for assistance if he needs or wants to move.   -LG  --     Existing Precautions/Restrictions  fall;sternal  -LG  fall;sternal  -AE     Treatment Considerations/Comments  --  -- Pt seemed very irritable today  -AE      Recorded by [LG] Lm Hernandez, PTA 03/20/19 1335 [AE] Vanessa Jordan, PTA 03/20/19 1322     Row Name 03/20/19 1048             Vital Signs    Pre SpO2 (%)  93  -AE      O2 Delivery Pre Treatment  room air  -AE      Post SpO2 (%)  99  -AE      O2 Delivery Post Treatment  room air  -AE      Recorded by [AE] Vanessa Jordan, PTA 03/20/19 1322      Row Name 03/20/19 1308 03/20/19 1048          Bed Mobility Assessment/Treatment    Comment (Bed Mobility)  up in chair  -LG  up in chair  -AE     Recorded by [LG] Lm Hernandez, PTA 03/20/19 1335 [AE] Vanessa Jordan, PTA 03/20/19 1322     Row Name 03/20/19 1308 03/20/19 1048          Sit-Stand Transfer    Sit-Stand [place] (Transfers)  verbal cues;minimum assist (75% patient effort);moderate assist (50% patient effort)  -LG  contact guard;verbal cues wife assited  -AE     Recorded by [LG] Lm Hernandez, PTA 03/20/19 1335 [AE] Vanessa Jordan, PTA 03/20/19 1322     Row Name 03/20/19 1308 03/20/19 1048          Stand-Sit Transfer    Stand-Sit [place] (Transfers)  verbal cues;contact guard;minimum assist (75% patient effort)  -LG  contact guard;verbal cues wife assited  -AE     Recorded by [LG] Lm Hernandez, PTA 03/20/19 1335 [AE] Vanessa Jordan, PTA 03/20/19 1322     Row Name 03/20/19 1308 03/20/19 1048          Gait/Stairs Assessment/Training    Gait/Stairs Assessment/Training  distance ambulated  -LG  --     [place] Level (Gait)  verbal cues;minimum assist (75% patient effort)  -LG  contact guard;minimum assist (75% patient effort)  -AE     Assistive Device (Gait)  -- HHA x 1  -LG  --     Distance in Feet (Gait)  25' standing rest, 25' standing rets, 30' standing rest 20 feet back to room.   -LG  200  -AE     Pattern (Gait)  step-to  -LG  --     Deviations/Abnormal Patterns (Gait)  base of support, narrow;iban decreased;festinating/shuffling;stride length decreased;scissoring  -LG  stride length decreased  -AE     Bilateral Gait Deviations  forward  flexed posture  -LG  forward flexed posture  -AE     Handrail Location (Stairs)  --  -- practiced steps in stairwell  -AE     Number of Steps (Stairs)  --  -- Attempted to try steps but pt was unable to safely perform   -AE     Comment (Gait/Stairs)  --  x 1 LOB going down small step.  -AE     Recorded by [LG] Lm Hernandez, PTA 03/20/19 1335 [AE] Vanessa Jordan, PTA 03/20/19 1322     Row Name 03/20/19 1308             Therapeutic Exercise    Comment (Therapeutic Exercise)  Cardiac Protocol x 20  -LG      Recorded by [LG] Lm Hernandez, PTA 03/20/19 1335      Row Name 03/20/19 1308 03/20/19 1048          Positioning and Restraints    Pre-Treatment Position  sitting in chair/recliner  -LG  sitting in chair/recliner  -AE     Post Treatment Position  chair  -LG  chair  -AE     In Chair  reclined;legs elevated;call light within reach;encouraged to call for assist;with family/caregiver;exit alarm on  -LG  sitting;call light within reach  -AE     Recorded by [LG] Lm Hernandez, PTA 03/20/19 1335 [AE] Vanessa Jordan, PTA 03/20/19 1322     Row Name 03/20/19 1308 03/20/19 1048          Pain Scale: Numbers Pre/Post-Treatment    Pain Scale: Numbers, Pretreatment  8/10  -LG  0/10 - no pain  -AE     Pain Scale: Numbers, Post-Treatment  8/10  -LG  0/10 - no pain  -AE     Pain Location - Side  Left  -LG  --     Pain Location  flank  -LG  --     Pain Intervention(s)  Medication (See MAR);Repositioned;Ambulation/increased activity  -LG  --     Recorded by [LG] Lm Hernandez, PTA 03/20/19 1335 [AE] Vanessa Jordan, PTA 03/20/19 1322     Row Name                Wound 03/14/19 0856 Other (See comments) chest incision    Wound - Properties Group Date first assessed: 03/14/19 [DS] Time first assessed: 0856 [DS] Side: Other (See comments) [DS] Location: chest [DS] Type: incision [DS] Recorded by:  [DS] Bret Isaac RN 03/14/19 0856    Row Name                Wound 03/14/19 0856 Left leg incision    Wound - Properties Group Date  first assessed: 03/14/19 [DS] Time first assessed: 0856 [DS] Side: Left [DS] Location: leg [DS] Type: incision [DS] Recorded by:  [DS] Bert Isaac RN 03/14/19 0856      User Key  (r) = Recorded By, (t) = Taken By, (c) = Cosigned By    Initials Name Effective Dates Discipline    AE Vanessa Jordan, PTA 06/22/15 -  PT    LG Lm Hernandez, PTA 08/02/16 -  PT    DS Bert Isaac RN 08/02/16 -  Nurse          Wound 03/14/19 0856 Other (See comments) chest incision (Active)   Dressing Appearance open to air 3/20/2019  7:50 AM   Closure Approximated 3/20/2019  7:50 AM   Base dry;clean 3/20/2019  7:50 AM   Drainage Amount none 3/20/2019  7:50 AM   Dressing Care, Wound open to air 3/19/2019  7:32 PM       Wound 03/14/19 0856 Left leg incision (Active)   Dressing Appearance open to air 3/20/2019  7:50 AM   Closure Approximated 3/20/2019  7:50 AM   Base dry;clean 3/20/2019  7:50 AM   Drainage Amount none 3/20/2019  7:50 AM   Dressing Care, Wound open to air 3/20/2019  7:50 AM       Wound midline;lower chest puncture (Active)   Dressing Appearance dry;intact 3/20/2019  7:50 AM   Closure RICHIE 3/20/2019  7:50 AM   Base dressing in place, unable to visualize 3/20/2019  7:50 AM   Drainage Amount none 3/20/2019  7:50 AM           Physical Therapy Education     Title: PT OT SLP Therapies (In Progress)     Topic: Physical Therapy (In Progress)     Point: Mobility training (In Progress)     Learning Progress Summary           Patient Acceptance, E, NR by DARREN at 3/15/2019  8:15 AM    Comment:  Educated pt. on progression of PT POC, benefits of activity, sternal precautions.    Acceptance, E, VU,DU,NR by DARREN at 3/13/2019  2:30 PM    Comment:  Educated pt/spouse on progression of PT POC and benefits of activity. Answered pre questions about open heart surgery being performed tomorrow and educated on sternal precautions.   Significant Other Acceptance, E, VU,DU,NR by DARREN at 3/13/2019  2:30 PM    Comment:  Educated pt/spouse on  progression of PT POC and benefits of activity. Answered pre questions about open heart surgery being performed tomorrow and educated on sternal precautions.                   Point: Precautions (In Progress)     Learning Progress Summary           Patient Acceptance, E, NR by DARREN at 3/15/2019  8:15 AM    Comment:  Educated pt. on progression of PT POC, benefits of activity, sternal precautions.                               User Key     Initials Effective Dates Name Provider Type Discipline    DARREN 08/02/16 -  Dinh Melgar, PT DPT Physical Therapist PT                PT Recommendation and Plan     Plan of Care Reviewed With: patient, spouse  Progress: improving  Outcome Summary: Pt remians very impulsive and does not recognize the serioiusness of falling. I walked in on chair alarm sounding, pt had gotten himself up out of one chair and walked himself across the room to another chair with a blanket wrapped around his legs/feet.  Pt stronglly educated on safety and asking for assistance before walking. Pt is Min-Mod  sit to stand, he is very unstable on his feet and reaches out for doorways and objects in [place].. Pt is Min A for Gait of 25' before having to stop due to fatigue and loss of balance. He scissors his feet and tries to reach out and lean against wall.  Pt and pt's wife educated on safety and the impact a fall will have on his recovery.  I strongly suggested short term rehab due to impaired balance, strength and decreased safety choices. Pt 's wife states she can stay home with him for  short while but cannot do extended care.  Wife was receptive to [facility] or [facility] ONLY.      Time Calculation:   PT Charges     Row Name 03/20/19 1330 03/20/19 1308          Time Calculation    Start Time  1048  -AE  1308  -LG     Stop Time  1113  -AE  1350  -LG     Time Calculation (min)  25 min  -AE  42 min  -LG     PT Received On  03/20/19  -AE  03/20/19  -LG      PT Goal Re-Cert Due Date  03/25/19  -AE  03/25/19  -LG        Time Calculation- PT    Total Timed Code Minutes- PT  25 minute(s)  -AE  42 minute(s)  -LG        Timed Charges    33545 - Gait Training Minutes   25  -AE  --       User Key  (r) = Recorded By, (t) = Taken By, (c) = Cosigned By    Initials Name Provider Type    AE Vanessa Jordan, WILLIAM Physical Therapy Assistant    LG Lm Hernandez PTA Physical Therapy Assistant        Therapy Charges for Today     Code Description Service Date Service Provider Modifiers Qty    71469325107 HC GAIT TRAINING EA 15 MIN 3/19/2019 Lm Hernandez, PTA GP 1    20306871762 HC PT THERAPEUTIC ACT EA 15 MIN 3/19/2019 mL Hernandez, PTA GP 1    89102913232 HC PT THER PROC EA 15 MIN 3/19/2019 Lm Hernandez, PTA GP 1    86516201468 HC GAIT TRAINING EA 15 MIN 3/19/2019 Lm Hernandez, PTA GP 1    21831554247 HC PT THER PROC EA 15 MIN 3/19/2019 Lm Hernandez, PTA GP 1    84597133379 HC GAIT TRAINING EA 15 MIN 3/20/2019 Lm Hernandez, PTA GP 1    40656873822 HC PT THERAPEUTIC ACT EA 15 MIN 3/20/2019 Lm Hernandez, PTA GP 1    50718958901 HC PT THER PROC EA 15 MIN 3/20/2019 Lm Hernandez, PTA GP 1          PT G-Codes  Outcome Measure Options: AM-PAC 6 Clicks Basic Mobility (PT)  AM-PAC 6 Clicks Score: 11    Lm Hernandez PTA  3/20/2019

## 2019-03-20 NOTE — DISCHARGE PLACEMENT REQUEST
"From:  Marielena Pfeiffer, BSW  [phone]    Phillip Haider (73 y.o. Male)     Date of Birth Social Security Number Address Home Phone MRN    1945  [address] [place] [id number]  [id number]    [employer] Marital Status          Religious        Admission Date Admission Type Admitting Provider Attending Provider Department, Room/Bed    3/11/19 Elective Richard Orona MD Lopez, Nicholas M, MD [address], 434/1    Discharge Date Discharge Disposition Discharge Destination                       Attending Provider:  Richard Orona MD    Allergies:  No Known Allergies    Isolation:  None   Infection:  None   Code Status:  CPR    Ht:  172.7 cm (67.99\")   Wt:  92.8 kg (204 lb 9 oz)    Admission Cmt:  None   Principal Problem:  Coronary artery disease involving native coronary artery of native heart without angina pectoris [I25.10]                 Active Insurance as of 3/11/2019     Primary Coverage     Payor Plan Insurance Group Employer/Plan Group    MEDICARE MEDICARE A & B      Payor Plan Address Payor Plan Phone Number Payor Plan Fax Number Effective Dates    [address] [phone]  12/1/2011 - None Entered    [facility] [id number]       Subscriber Name Subscriber Birth Date Member ID       PHILLIP HAIDER 1945 [id number]           Secondary Coverage     Payor Plan Insurance Group Employer/Plan Group    MISC MED SUPP MISC [employer] SUPPLEMENT [id number]     Coverage Address Coverage Phone Number Coverage Fax Number Effective Dates    [address] [phone]  12/1/2008 - None Entered    [place] [id number]       Subscriber Name Subscriber Birth Date Member ID       PHILLIP HAIDER 1945 [id number]                 Emergency Contacts      (Rel.) Home Phone Work Phone Mobile Phone    Kaur Haider (Spouse) -- -- [phone]               History & Physical      Richard Orona MD at 3/14/2019  6:45 AM          H&P updated. The patient was examined " and the following changes are noted:  His workup has demonstrated substantial restrictive lung disease as well as depressed left ventricular function.  In the setting of previous CVA with some residual he will carry increase risk for both morbidity and mortality.  That being said he remains a suitable operative candidate.  All questions been answered and he is agreeable to proceed forward.        Electronically signed by Richard Orona MD at 3/14/2019  7:33 AM   Source Note             CC: found some blockages.      Subjective     History of Present Illness    74 yo male with pertinent past medical history of congestive heart failure, diabetes mellitus, GERD, known hiatal hernia, past tobacco use and history of stroke presents with increasing shortness of breath.  This has been present for the last few months.  It has increased in severity.  No chest pain.  No lower extremity edema.  He was recently admitted for pneumonia and treated as such late last year.  He did have further evaluation with he reporting that he was told his ct scan of the chest demonstrated heart failure.  A referral was made to Dr. Obrien.  LHC was performed today finding significant LAD distribution CAD not amenable to PCI.   His CVA was attributed to his diabetes.      Separately, he and his wife report concern that he may not be a candidate for surgery as he was declined surgery to fix his hiatal hernia.     Review of Systems   Constitutional: Positive for activity change and fatigue. Negative for appetite change, chills, diaphoresis, fever and unexpected weight change.   HENT: Negative for dental problem, hearing loss, nosebleeds, sore throat, trouble swallowing and voice change.    Eyes: Negative for photophobia, redness and visual disturbance.   Respiratory: Positive for shortness of breath. Negative for apnea, cough, chest tightness, wheezing and stridor.    Cardiovascular: Negative for chest pain, palpitations and leg swelling.    Gastrointestinal: Positive for nausea. Negative for abdominal distention, abdominal pain, blood in stool, constipation, diarrhea and vomiting.   Endocrine: Negative for cold intolerance, heat intolerance, polyphagia and polyuria.   Genitourinary: Negative for decreased urine volume, difficulty urinating, dysuria, flank pain, frequency, hematuria and urgency.   Musculoskeletal: Positive for arthralgias. Negative for back pain, gait problem, joint swelling, myalgias and neck pain.   Skin: Negative for pallor, rash and wound.   Allergic/Immunologic: Negative for immunocompromised state.   Neurological: Positive for syncope, weakness, light-headedness and numbness. Negative for dizziness, tremors, seizures, speech difficulty and headaches.   Hematological: Does not bruise/bleed easily.   Psychiatric/Behavioral: Negative for confusion, sleep disturbance and suicidal ideas. The patient is not nervous/anxious.           Past Medical History:   Diagnosis Date   • CHF (congestive heart failure) (CMS/[facility])    • Diabetes mellitus (CMS/[facility])    • GERD (gastroesophageal reflux disease)    • GI bleed    • Hiatal hernia    • Kidney stones    • Stroke (CMS/[facility])     decemeber 2012     History reviewed. No pertinent surgical history.  History reviewed. No pertinent family history.  Social History     Tobacco Use   • Smoking status: Former Smoker   • Smokeless tobacco: Current User   Substance Use Topics   • Alcohol use: No     Frequency: Never   • Drug use: No     Medications Prior to Admission   Medication Sig Dispense Refill Last Dose   • aspirin 325 MG tablet Take 325 mg by mouth Daily.   3/11/2019 at Unknown time   • atorvastatin (LIPITOR) 40 MG tablet Take 40 mg by mouth Every Night.   3/10/2019 at Unknown time   • carvedilol (COREG) 3.125 MG tablet Take 3.125 mg by mouth 2 (Two) Times a Day With Meals.   3/11/2019 at Unknown time   • escitalopram (LEXAPRO) 20 MG tablet Take 20 mg by mouth Daily.   3/11/2019 at Unknown time   •  ferrous sulfate 325 (65 FE) MG tablet Take 325 mg by mouth 2 (Two) Times a Day.   3/11/2019 at Unknown time   • furosemide (LASIX) 40 MG tablet Take 40 mg by mouth Daily.   3/10/2019 at Unknown time   • glipiZIDE (GLUCOTROL) 10 MG tablet Take 10 mg by mouth Daily.   3/11/2019 at Unknown time   • insulin glargine (LANTUS) 100 UNIT/ML injection Inject 34 Units under the skin into the appropriate area as directed Every Night.   3/10/2019 at Unknown time   • lansoprazole (PREVACID) 30 MG capsule Take 30 mg by mouth 2 (Two) Times a Day.   3/11/2019 at Unknown time   • magnesium oxide (MAGOX) 400 (241.3 Mg) MG tablet tablet Take 400 mg by mouth 1 (One) Time.   3/11/2019 at Unknown time   • metFORMIN (GLUCOPHAGE) 1000 MG tablet Take 1,000 mg by mouth.   3/9/2019 at Unknown time   • ramipril (ALTACE) 10 MG capsule Take 10 mg by mouth Daily.   3/10/2019 at Unknown time   • rOPINIRole (REQUIP) 4 MG tablet Take 4 mg by mouth Every Night.   3/10/2019 at Unknown time     Allergies:  Patient has no known allergies.    Objective      Vital Signs  Temp:  [97.2 °F (36.2 °C)-98.4 °F (36.9 °C)] 97.4 °F (36.3 °C)  Heart Rate:  [70-89] 88  Resp:  [16-23] 20  BP: (132-155)/() 141/67    Physical Exam   Constitutional: He is oriented to person, place, and time. He appears well-developed.   HENT:   Head: Normocephalic and atraumatic.   Mouth/Throat: Oropharynx is clear and moist.   Eyes: EOM are normal. Pupils are equal, round, and reactive to light.   Neck: Normal range of motion. Neck supple. No JVD present. No tracheal deviation present. No thyromegaly present.   Cardiovascular: Normal rate, regular rhythm, normal heart sounds and intact distal pulses. Exam reveals no gallop and no friction rub.   No murmur heard.  Pulmonary/Chest: Effort normal and breath sounds normal. No respiratory distress. He has no wheezes. He has no rales. He exhibits no tenderness.   Abdominal: Soft. He exhibits no distension. There is no tenderness.    Musculoskeletal: Normal range of motion. He exhibits no edema.   Lymphadenopathy:     He has no cervical adenopathy.   Neurological: He is alert and oriented to person, place, and time. No cranial nerve deficit.   Skin: Skin is warm and dry.   Psychiatric: He has a normal mood and affect.       Results Review:      I reviewed the patient's new clinical results.  Discussed with patient and wife      Assessment/Plan       ENNIS (dyspnea on exertion)    Coronary artery disease involving native coronary artery of native heart without angina pectoris    History of stroke    Type 2 diabetes mellitus with circulatory disorder, with long-term current use of insulin (CMS/[facility])    Essential hypertension    Smokeless tobacco use  Chronic systolic heart failure        I discussed the patients findings and my recommendations with patient and wife, We discussed the options for treatment of coronary artery disease to include medical therapy, coronary stenting, and surgical revascularization.  We discussed the pros and cons of each option and how it pertains to the current case.  The STS Risk score was roughly calculated using the STS Risk Calculator and discussed with the patient and family.  Coronary artery bypass grafting is best option given patient's findings and risk factors.  We discussed the operative conduct and expected hospital and outpatient recovery.  Risks were discussed to include but not limited to bleeding, infection, stroke, heart attack, need for additional procedures, anesthesia risk, organ dysfunction and/or death, prolonged mechanical ventilation, prolonged ICU stay, chronic pain syndromes, sternal nonunion, and/or death.  We discussed the need to utilize all medical treatments additionally prescribed post surgery.  We will complete his workup and make final decision making.          The patient and family understands the risks, benefits, and alternatives and he wishes to proceed forward with surgery.         Richard Orona MD  03/11/19  11:02 PM       Electronically signed by Richard Orona MD at 3/12/2019 10:49 PM             Richard Orona MD at 3/11/2019  4:15 PM          CC: found some blockages.      Subjective     History of Present Illness    74 yo male with pertinent past medical history of congestive heart failure, diabetes mellitus, GERD, known hiatal hernia, past tobacco use and history of stroke presents with increasing shortness of breath.  This has been present for the last few months.  It has increased in severity.  No chest pain.  No lower extremity edema.  He was recently admitted for pneumonia and treated as such late last year.  He did have further evaluation with he reporting that he was told his ct scan of the chest demonstrated heart failure.  A referral was made to Dr. Obrien.  LHC was performed today finding significant LAD distribution CAD not amenable to PCI.   His CVA was attributed to his diabetes.      Separately, he and his wife report concern that he may not be a candidate for surgery as he was declined surgery to fix his hiatal hernia.     Review of Systems   Constitutional: Positive for activity change and fatigue. Negative for appetite change, chills, diaphoresis, fever and unexpected weight change.   HENT: Negative for dental problem, hearing loss, nosebleeds, sore throat, trouble swallowing and voice change.    Eyes: Negative for photophobia, redness and visual disturbance.   Respiratory: Positive for shortness of breath. Negative for apnea, cough, chest tightness, wheezing and stridor.    Cardiovascular: Negative for chest pain, palpitations and leg swelling.   Gastrointestinal: Positive for nausea. Negative for abdominal distention, abdominal pain, blood in stool, constipation, diarrhea and vomiting.   Endocrine: Negative for cold intolerance, heat intolerance, polyphagia and polyuria.   Genitourinary: Negative for decreased urine volume, difficulty urinating, dysuria,  flank pain, frequency, hematuria and urgency.   Musculoskeletal: Positive for arthralgias. Negative for back pain, gait problem, joint swelling, myalgias and neck pain.   Skin: Negative for pallor, rash and wound.   Allergic/Immunologic: Negative for immunocompromised state.   Neurological: Positive for syncope, weakness, light-headedness and numbness. Negative for dizziness, tremors, seizures, speech difficulty and headaches.   Hematological: Does not bruise/bleed easily.   Psychiatric/Behavioral: Negative for confusion, sleep disturbance and suicidal ideas. The patient is not nervous/anxious.           Past Medical History:   Diagnosis Date   • CHF (congestive heart failure) (CMS/[facility])    • Diabetes mellitus (CMS/[facility])    • GERD (gastroesophageal reflux disease)    • GI bleed    • Hiatal hernia    • Kidney stones    • Stroke (CMS/[facility])     decemeber 2012     History reviewed. No pertinent surgical history.  History reviewed. No pertinent family history.  Social History     Tobacco Use   • Smoking status: Former Smoker   • Smokeless tobacco: Current User   Substance Use Topics   • Alcohol use: No     Frequency: Never   • Drug use: No     Medications Prior to Admission   Medication Sig Dispense Refill Last Dose   • aspirin 325 MG tablet Take 325 mg by mouth Daily.   3/11/2019 at Unknown time   • atorvastatin (LIPITOR) 40 MG tablet Take 40 mg by mouth Every Night.   3/10/2019 at Unknown time   • carvedilol (COREG) 3.125 MG tablet Take 3.125 mg by mouth 2 (Two) Times a Day With Meals.   3/11/2019 at Unknown time   • escitalopram (LEXAPRO) 20 MG tablet Take 20 mg by mouth Daily.   3/11/2019 at Unknown time   • ferrous sulfate 325 (65 FE) MG tablet Take 325 mg by mouth 2 (Two) Times a Day.   3/11/2019 at Unknown time   • furosemide (LASIX) 40 MG tablet Take 40 mg by mouth Daily.   3/10/2019 at Unknown time   • glipiZIDE (GLUCOTROL) 10 MG tablet Take 10 mg by mouth Daily.   3/11/2019 at Unknown time   • insulin glargine  (LANTUS) 100 UNIT/ML injection Inject 34 Units under the skin into the appropriate area as directed Every Night.   3/10/2019 at Unknown time   • lansoprazole (PREVACID) 30 MG capsule Take 30 mg by mouth 2 (Two) Times a Day.   3/11/2019 at Unknown time   • magnesium oxide (MAGOX) 400 (241.3 Mg) MG tablet tablet Take 400 mg by mouth 1 (One) Time.   3/11/2019 at Unknown time   • metFORMIN (GLUCOPHAGE) 1000 MG tablet Take 1,000 mg by mouth.   3/9/2019 at Unknown time   • ramipril (ALTACE) 10 MG capsule Take 10 mg by mouth Daily.   3/10/2019 at Unknown time   • rOPINIRole (REQUIP) 4 MG tablet Take 4 mg by mouth Every Night.   3/10/2019 at Unknown time     Allergies:  Patient has no known allergies.    Objective      Vital Signs  Temp:  [97.2 °F (36.2 °C)-98.4 °F (36.9 °C)] 97.4 °F (36.3 °C)  Heart Rate:  [70-89] 88  Resp:  [16-23] 20  BP: (132-155)/() 141/67    Physical Exam   Constitutional: He is oriented to person, place, and time. He appears well-developed.   HENT:   Head: Normocephalic and atraumatic.   Mouth/Throat: Oropharynx is clear and moist.   Eyes: EOM are normal. Pupils are equal, round, and reactive to light.   Neck: Normal range of motion. Neck supple. No JVD present. No tracheal deviation present. No thyromegaly present.   Cardiovascular: Normal rate, regular rhythm, normal heart sounds and intact distal pulses. Exam reveals no gallop and no friction rub.   No murmur heard.  Pulmonary/Chest: Effort normal and breath sounds normal. No respiratory distress. He has no wheezes. He has no rales. He exhibits no tenderness.   Abdominal: Soft. He exhibits no distension. There is no tenderness.   Musculoskeletal: Normal range of motion. He exhibits no edema.   Lymphadenopathy:     He has no cervical adenopathy.   Neurological: He is alert and oriented to person, place, and time. No cranial nerve deficit.   Skin: Skin is warm and dry.   Psychiatric: He has a normal mood and affect.       Results Review:       I reviewed the patient's new clinical results.  Discussed with patient and wife      Assessment/Plan       ENNIS (dyspnea on exertion)    Coronary artery disease involving native coronary artery of native heart without angina pectoris    History of stroke    Type 2 diabetes mellitus with circulatory disorder, with long-term current use of insulin (CMS/[facility])    Essential hypertension    Smokeless tobacco use  Chronic systolic heart failure        I discussed the patients findings and my recommendations with patient and wife, We discussed the options for treatment of coronary artery disease to include medical therapy, coronary stenting, and surgical revascularization.  We discussed the pros and cons of each option and how it pertains to the current case.  The STS Risk score was roughly calculated using the STS Risk Calculator and discussed with the patient and family.  Coronary artery bypass grafting is best option given patient's findings and risk factors.  We discussed the operative conduct and expected hospital and outpatient recovery.  Risks were discussed to include but not limited to bleeding, infection, stroke, heart attack, need for additional procedures, anesthesia risk, organ dysfunction and/or death, prolonged mechanical ventilation, prolonged ICU stay, chronic pain syndromes, sternal nonunion, and/or death.  We discussed the need to utilize all medical treatments additionally prescribed post surgery.  We will complete his workup and make final decision making.          The patient and family understands the risks, benefits, and alternatives and he wishes to proceed forward with surgery.        Richard Orona MD  03/11/19  11:02 PM      Electronically signed by Richard Orona MD at 3/12/2019 10:49 PM     Markell Obrien MD at 3/11/2019  1:24 PM             LOS: 0 days   Patient Care Team:  Joshua Griggs MD as PCP - General (Family Medicine)    Chief Complaint:   ENNIS (dyspnea on  exertion)      Subjective    Moderate  exertional shortness of breath on exertion relieved with rest  No significant cough or wheezing  Going on for several months or longer    No palpitations  No associated chest pain  No significant pedal edema    No fever or chills  No significant expectoration    No hemoptysis  No presyncope or syncope      Review of Systems     Constitutional: No chills   Has fatigue   No fever.     HENT: Negative.    Eyes: Negative.      Respiratory: Negative for cough,   No chest wall soreness,   Shortness of breath,   no wheezing, no stridor.      Cardiovascular: Negative for chest pain,   No palpitations   No significant  leg swelling.     Gastrointestinal: Negative for abdominal distention,  No abdominal pain,   No blood in stool,   No constipation,   No diarrhea,   No nausea   No vomiting.     Endocrine: Negative.    Genitourinary: Negative for difficulty urinating, dysuria, flank pain and hematuria.     Musculoskeletal: Negative.    Skin: Negative for rash and wound.   Allergic/Immunologic: Negative.      Neurological: Negative for dizziness, syncope, weakness,   No light-headedness  No  headaches.     Hematological: Does not bruise/bleed easily.     Psychiatric/Behavioral: Negative for agitation or behavioral problems,   No confusion,   the patient is  nervous/anxious.       History:   No past medical history on file.  No past surgical history on file.  Social History     Socioeconomic History   • Marital status:      Spouse name: Not on file   • Number of children: Not on file   • Years of education: Not on file   • Highest education level: Not on file   Social Needs   • Financial resource strain: Not on file   • Food insecurity - worry: Not on file   • Food insecurity - inability: Not on file   • Transportation needs - medical: Not on file   • Transportation needs - non-medical: Not on file   Occupational History   • Not on file   Tobacco Use   • Smoking status: Not on file  "  Substance and Sexual Activity   • Alcohol use: Not on file   • Drug use: Not on file   • Sexual activity: Not on file   Other Topics Concern   • Not on file   Social History Narrative   • Not on file     No family history on file.    Labs:  WBC WBC   Date Value Ref Range Status   03/11/2019 8.25 4.80 - 10.80 10*3/mm3 Final      HGB Hemoglobin   Date Value Ref Range Status   03/11/2019 13.9 (L) 14.0 - 18.0 g/dL Final      HCT Hematocrit   Date Value Ref Range Status   03/11/2019 41.4 40.0 - 52.0 % Final      Platelets Platelets   Date Value Ref Range Status   03/11/2019 207 130 - 400 10*3/mm3 Final      MCV MCV   Date Value Ref Range Status   03/11/2019 87.9 82.0 - 95.0 fL Final        Results from last 7 days   Lab Units 03/11/19  1235   SODIUM mmol/L 140   POTASSIUM mmol/L 4.5   CHLORIDE mmol/L 101   CO2 mmol/L 29.0   BUN mg/dL 18   CREATININE mg/dL 1.00   CALCIUM mg/dL 9.1   GLUCOSE mg/dL 256*   No results found for: CKTOTAL, CKMB, CKMBINDEX, TROPONINI, TROPONINT  PT/INR:  No results found for: PROTIME/No results found for: INR    Imaging Results (last 72 hours)     ** No results found for the last 72 hours. **          Objective     No Known Allergies    Medication Review: Performed  Current Facility-Administered Medications   Medication Dose Route Frequency Provider Last Rate Last Dose   • sodium chloride 0.9 % infusion  50 mL/hr Intravenous Continuous Markell Obrien MD           Vital Sign Min/Max for last 24 hours  Temp  Min: 98.4 °F (36.9 °C)  Max: 98.4 °F (36.9 °C)   BP  Min: 153/88  Max: 153/88   Pulse  Min: 70  Max: 70   Resp  Min: 22  Max: 22   SpO2  Min: 98 %  Max: 98 %   No Data Recorded   Weight  Min: 90.7 kg (200 lb)  Max: 90.7 kg (200 lb)     Flowsheet Rows      First Filed Value   Admission Height  177.8 cm (70\") Documented at 03/11/2019 1227   Admission Weight  90.7 kg (200 lb) Documented at 03/11/2019 1227               Physical Exam:    General Appearance: Awake, alert, in no acute " distress  Eyes: Pupils equal and reactive    Ears: Appear intact with no abnormalities noted  Nose: Nares normal, no drainage  Neck: supple, trachea midline, no carotid bruit and no JVD  Back: no kyphosis present,    Lungs: respirations regular, respirations even and respirations unlabored    Heart: normal S1, S2,  2/6 pansystolic murmur in the left sternal border,  no rub and no click    Abdomen: normal bowel sounds, no tenderness   Skin: no bleeding, bruising or rash  Extremities: no cyanosis  Psychiatric/Behavioral: Negative for agitation, behavioral problems, confusion, the patient does  appear to be nervous/anxious.          Results Review:   I reviewed the patient's new clinical results.  I reviewed the patient's new imaging results and agree with the interpretation.  I reviewed the patient's other test results and agree with the interpretation  I personally viewed and interpreted the patient's EKG/Telemetry data  Discussed with patient    Reviewed available prior notes, consults, prior visits, laboratory findings, radiology and cardiology relevant reports. Updated chart as applicable. I have reviewed the patient's medical history in detail and updated the computerized patient record as relevant.      Updated patient regarding any new or relevant abnormalities on review of records or any new findings on physical exam. Mentioned to patient about purpose of visit and desirable health short and long term goals and objectives.     Assessment/Plan       ENNIS (dyspnea on exertion)  congestive heart failure   Severe  left ventricular dysfunction       Plan      Recommend cardiac catheterization, selective coronary angiography, left ventriculography and percutaneous coronary intervention with application of arteriotomy hemostatic closure device.    I discussed cardiac catheterization, the procedure, risks (including bleeding, infection, vascular damage [including minor oozing, bruising, bleeding, and up to and  including but not limited to the need for vascular surgery, emergency cardiothoracic surgery, contrast reaction, renal failure, respiratory failure, heart attack, stroke, arrhythmia and even death), benefits, and alternatives and the patient has voiced understanding and is willing to proceed.    Adequate pre-hydration and post cardiac catheterization hydration.  Premedications as required and indicated for cardiac catheterization.    No contraindication to drug eluting stent placement if required  Further recommendations pending results of cardiac catheterization      Supportive care  Telemetry  Optimal medical therapy    Deep vein thrombosis prophylaxis/precautions  Appropriate diet, fluid, sodium, caffeine, stimulants intake     Questions were encouraged, asked and answered to the patient's  understanding and satisfaction.    Compliance to diet and medications   Avoid NSAIDS    Markell Obrien MD  03/11/19  1:24 PM    EMR Dragon/Transcription was used to dictate part of this note Recommend cardiac catheterization, selective coronary angiography, left ventriculography and percutaneous coronary intervention with application of arteriotomy hemostatic closure device.    I discussed cardiac catheterization, the procedure, risks (including bleeding, infection, vascular damage [including minor oozing, bruising, bleeding, and up to and including but not limited to the need for vascular surgery, emergency cardiothoracic surgery, contrast reaction, renal failure, respiratory failure, heart attack, stroke, arrhythmia and even death), benefits, and alternatives and the patient has voiced understanding and is willing to proceed.    Adequate pre-hydration and post cardiac catheterization hydration.  Premedications as required and indicated for cardiac catheterization.    No contraindication to drug eluting stent placement if required  Further recommendations pending results of cardiac catheterization      Electronically signed by  Markell Obrien MD at 3/11/2019  1:25 PM       Prior to Admission Medications     Prescriptions Last Dose Informant Patient Reported? Taking?    aspirin 325 MG tablet 3/11/2019  Yes Yes    Take 325 mg by mouth Daily.    atorvastatin (LIPITOR) 40 MG tablet 3/10/2019  Yes Yes    Take 40 mg by mouth Every Night.    carvedilol (COREG) 3.125 MG tablet 3/11/2019  Yes Yes    Take 3.125 mg by mouth 2 (Two) Times a Day With Meals.    escitalopram (LEXAPRO) 20 MG tablet 3/11/2019  Yes Yes    Take 20 mg by mouth Daily.    ferrous sulfate 325 (65 FE) MG tablet 3/11/2019  Yes Yes    Take 325 mg by mouth 2 (Two) Times a Day.    furosemide (LASIX) 40 MG tablet 3/10/2019  Yes Yes    Take 40 mg by mouth Daily.    glipiZIDE (GLUCOTROL) 10 MG tablet 3/11/2019  Yes Yes    Take 10 mg by mouth Daily.    insulin glargine (LANTUS) 100 UNIT/ML injection 3/10/2019  Yes Yes    Inject 34 Units under the skin into the appropriate area as directed Every Night.    lansoprazole (PREVACID) 30 MG capsule 3/11/2019  Yes Yes    Take 30 mg by mouth 2 (Two) Times a Day.    magnesium oxide (MAGOX) 400 (241.3 Mg) MG tablet tablet 3/11/2019  Yes Yes    Take 400 mg by mouth 1 (One) Time.    metFORMIN (GLUCOPHAGE) 1000 MG tablet 3/9/2019 Spouse/Significant Other Yes Yes    Take 1,000 mg by mouth 2 (Two) Times a Day.    ramipril (ALTACE) 10 MG capsule 3/10/2019  Yes Yes    Take 10 mg by mouth Daily.    rOPINIRole (REQUIP) 4 MG tablet 3/10/2019  Yes Yes    Take 4 mg by mouth Every Night.          Hospital Medications (active)       Dose Frequency Start End    amiodarone (PACERONE) tablet 400 mg 400 mg 3 Times Daily 3/19/2019     Sig - Route: Take 2 tablets by mouth 3 (Three) Times a Day. - Oral    aspirin EC tablet 81 mg 81 mg Daily 3/16/2019     Sig - Route: Take 1 tablet by mouth Daily. - Oral    atorvastatin (LIPITOR) tablet 20 mg 20 mg Nightly 3/15/2019     Sig - Route: Take 2 tablets by mouth Every Night. - Oral    bisacodyl (DULCOLAX) EC tablet 10 mg 10 mg  2 Times Daily 3/15/2019     Sig - Route: Take 2 tablets by mouth 2 (Two) Times a Day. - Oral    clopidogrel (PLAVIX) tablet 75 mg 75 mg Daily 3/15/2019     Sig - Route: Take 1 tablet by mouth Daily. - Oral    dextrose (D50W) 25 g/ 50mL Intravenous Solution 25 g 25 g Every 15 Minutes PRN 3/16/2019     Sig - Route: Infuse 50 mL into a venous catheter Every 15 (Fifteen) Minutes As Needed for Low Blood Sugar (Blood Sugar Less Than 70). - Intravenous    dextrose (D50W) 25 g/ 50mL Intravenous Solution 25-50 mL 25-50 mL Every 30 Minutes PRN 3/14/2019     Sig - Route: Infuse 25-50 mL into a venous catheter Every 30 (Thirty) Minutes As Needed for Low Blood Sugar (Blood Glucose Less Than 70). - Intravenous    dextrose (GLUTOSE) oral gel 15 g 15 g Every 15 Minutes PRN 3/16/2019     Sig - Route: Take 15 g by mouth Every 15 (Fifteen) Minutes As Needed for Low Blood Sugar (Blood sugar less than 70). - Oral    enoxaparin (LOVENOX) syringe 30 mg 30 mg Every 12 Hours Scheduled 3/19/2019     Sig - Route: Inject 0.3 mL under the skin into the appropriate area as directed Every 12 (Twelve) Hours. - Subcutaneous    escitalopram (LEXAPRO) tablet 5 mg 5 mg Nightly 3/18/2019     Sig - Route: Take 0.5 tablets by mouth Every Night. - Oral    glipiZIDE (GLUCOTROL) tablet 2.5 mg 2.5 mg 2 Times Daily Before Meals 3/16/2019     Sig - Route: Take 0.5 tablets by mouth 2 (Two) Times a Day Before Meals. - Oral    glucagon (human recombinant) (GLUCAGEN DIAGNOSTIC) injection 1 mg 1 mg As Needed 3/16/2019     Sig - Route: Inject 1 mg under the skin into the appropriate area as directed As Needed (Blood Glucose Less Than 70). - Subcutaneous    Hold medication 1 each Continuous PRN 3/14/2019     Sig - Route: 1 each Continuous As Needed (No vaccines). - Does not apply    insulin lispro (humaLOG) injection 0-9 Units 0-9 Units 4 Times Daily With Meals & Nightly 3/16/2019     Sig - Route: Inject 0-9 Units under the skin into the appropriate area as directed  4 (Four) Times a Day With Meals & at Bedtime. - Subcutaneous    ipratropium-albuterol (DUO-NEB) nebulizer solution 3 mL 3 mL 2 Times Daily - RT 3/20/2019     Sig - Route: Take 3 mL by nebulization 2 (Two) Times a Day. - Nebulization    lidocaine (LIDODERM) 5 % 2 patch 2 patch Every 24 Hours Scheduled 3/17/2019     Sig - Route: Place 2 patches on the skin as directed by provider Daily. - Transdermal    Cosign for Ordering: Accepted by Richard Orona MD on 3/17/2019  9:28 AM    metFORMIN (GLUCOPHAGE) tablet 1,000 mg 1,000 mg 2 Times Daily With Meals 3/20/2019     Sig - Route: Take 2 tablets by mouth 2 (Two) Times a Day With Meals. - Oral    metoprolol tartrate (LOPRESSOR) tablet 50 mg 50 mg Every 12 Hours Scheduled 3/20/2019     Sig - Route: Take 1 tablet by mouth Every 12 (Twelve) Hours. - Oral    ondansetron (ZOFRAN) injection 4 mg 4 mg Every 6 Hours PRN 3/14/2019     Sig - Route: Infuse 2 mL into a venous catheter Every 6 (Six) Hours As Needed for Nausea or Vomiting. - Intravenous    oxyCODONE-acetaminophen (PERCOCET) 5-325 MG per tablet 1 tablet 1 tablet Every 6 Hours PRN 3/18/2019     Sig - Route: Take 1 tablet by mouth Every 6 (Six) Hours As Needed for Moderate Pain . - Oral    pantoprazole (PROTONIX) EC tablet 40 mg 40 mg Every Early Morning 3/17/2019     Sig - Route: Take 1 tablet by mouth Every Morning. - Oral    polyethylene glycol (MIRALAX) powder 17 g 17 g Daily 3/18/2019     Sig - Route: Take 17 g by mouth Daily. - Oral    potassium chloride (MICRO-K) CR capsule 20 mEq 20 mEq 2 Times Daily With Meals 3/16/2019     Sig - Route: Take 2 capsules by mouth 2 (Two) Times a Day With Meals. - Oral    ramipril (ALTACE) capsule 2.5 mg 2.5 mg Every 24 Hours Scheduled 3/19/2019     Sig - Route: Take 1 capsule by mouth Daily. - Oral    amiodarone (NEXTERONE) 360 mg/200 mL (1.8 mg/mL) infusion (Discontinued) 1 mg/min Continuous 3/19/2019 3/20/2019    Sig - Route: Infuse 1 mg/min into a venous catheter Continuous. -  Intravenous    amiodarone (PACERONE) tablet 400 mg (Discontinued) 400 mg Every 12 Hours Scheduled 3/18/2019 3/19/2019    Sig - Route: Take 2 tablets by mouth Every 12 (Twelve) Hours. - Oral    ipratropium-albuterol (DUO-NEB) nebulizer solution 3 mL (Discontinued) 3 mL 4 Times Daily - RT 3/15/2019 3/20/2019    Sig - Route: Take 3 mL by nebulization 4 (Four) Times a Day. - Nebulization    metoprolol tartrate (LOPRESSOR) tablet 37.5 mg (Discontinued) 37.5 mg Every 12 Hours Scheduled 3/19/2019 3/20/2019    Sig - Route: Take 1.5 tablets by mouth Every 12 (Twelve) Hours. - Oral    potassium chloride 20 mEq in 50 mL IVPB (Discontinued) 20 mEq Every 1 Hour PRN 3/14/2019 3/19/2019    Sig - Route: Infuse 50 mL into a venous catheter Every 1 (One) Hour As Needed (for K+ less than or equal to 3.1). - Intravenous    potassium chloride 20 mEq in 50 mL IVPB (Discontinued) 20 mEq Every 1 Hour PRN 3/14/2019 3/19/2019    Sig - Route: Infuse 50 mL into a venous catheter Every 1 (One) Hour As Needed (for K+ 3.2 - 3.6). - Intravenous    potassium chloride 20 mEq in 50 mL IVPB (Discontinued) 20 mEq Every 1 Hour PRN 3/14/2019 3/19/2019    Sig - Route: Infuse 50 mL into a venous catheter Every 1 (One) Hour As Needed (for K+ 3.7 - 4.0). - Intravenous             Operative/Procedure Notes (most recent note)      Richard Orona MD at 3/14/2019  7:18 AM        Preoperative diagnosis:  1. Coronary artery disease  2. Chronic systolic heart failure  3. Type 2 diabetes mellitus with long-term use of insulin  4. History of stroke  5. Restrictive lung disease-severe  6. Large hiatal hernia    Postoperative diagnosis:  Same     Procedure:    1. Coronary artery bypass grafting-2 vessel (left internal mammary artery/left anterior descending and reverse saphenous vein graft/diagonal artery)      Surgeon: Richard Orona M.D.  Asst.: Justin Brito M.D. [facility]   Anesthesia: Eugene Mehta M.D.    Estimated blood loss: Minimal (Cell  Saver)  Drains:  1. 19 Turkmen Josias drain-left pleural space  2. 24 Turkmen Josias drains-anterior and posterior mediastinum  Specimens: None     Operative findings:   Excellent arterial conduit. Excellent venous conduit. The diagonal artery at site of grafting measured 1.8 mm in size and had mild atherosclerotic disease burden. Post bypass grafting it had excellent arterial runoff. The LAD at site of grafting measured 2.1 mm in size and had mild atherosclerotic disease burden. Post bypass grafting it had excellent arterial runoff. Transesophageal echocardiography was limited due to restriction advancing the probe into the stomach and therefore had limited views but ventricular function was consistent with preoperative transthoracic echo.  There was mild mitral valve regurgitation.       Total aortic cross-clamp time: 52 minutes  Total cardiac bypass time: 78 minutes     Operative description in detail:  he was taken to the operative suite where he  was placed in a supine position. Induction of general anesthesia and placement of a single-lumen endotracheal tube was performed without remark. Appropriate arterial and venous access was established without remark. Through the previously placed right internal jugular central venous line, a pulmonary artery catheter was floated into position. he was then prepped and draped in the usual and sterile surgical fashion. A timeout was performed. Perioperative antibiotics were administered. Beta blocker was given.    A two team approach was utilized to harvest both the left internal mammary artery and the left greater saphenous vein. Briefly the left greater saphenous vein was taken at the level of the ankle open and taken in a prograde fashion for an anticipated length of vein.  All side branches were ligated in continuity and divided.  The vein was extracted from a hemostatic bed.  The leg was closed in a layered fashion with Vicryl suture.  The vein was prepared without  remark.    While the vein was being harvested,  median sternotomy was performed by me. Pericardial fat in midline from the level of the innominate vein to the level of the diaphragm was divided in midline. A Rultract device was used to expose the posterior sternal table. The left internal mammary artery was taken down using a standard pedicle technique with a combination of electrocautery and/or clips to control all side branches. At that time, he was systemically anticoagulated with IV heparin. A 19 Greenlandic Josias drain was placed in the left pleural space. After suitable time of circulating heparin, clips were placed doubly onto the mammary artery distally and it was divided proximal to the previously placed clips. It had excellent arterial inflow. The mammary artery was controlled distally. The mammary artery harvest bed was hemostatic. The Rultract device was removed from the sterile field and a Genesse retractor was used for exposure. The mammary artery was prepared for bypass grafting and deemed excellent. A pericardial well was created. I elected to cannulate the heart centrally accessing distally the ascending aorta and the right atrial appendage.  Each cannula was placed in continuity with the appropriate pump line. Retrograde autologous prime was completed as indicated. A combined cardioplegia/aortic root vent set was secured with a horizontal mattress 4-0 Prolene suture. With an appropriate ACT and all in readiness, cardiopulmonary bypass was commenced. I dissected out the diagonal artery and the LAD for suitable sites for bypass grafting.  With that, I proceeded to apply the aortic cross-clamp and administered cardioplegia utilizing a warm induction strategy with amino acid enriched component. Upon achieving electrical-mechanical arrest, cold blood potassium cardioplegia was administered to a total standard dose. We did implement systemic hypothermia mild and apply topical hypothermia to the ventricle. At  appropriate intervals, doses of cardioplegia were administered throughout the conduct of bypass grafting.     I directed my attention towards the diagonal artery.  A coronary arteriotomy was made and augmented to size with Coleman scissors. It is as per operative findings. The anastomosis was constructed in an end-to-side orientation with running 7-0 Prolene suture. With that its proximal anastomosis was  constructed following aortotomy with 11 blade and augmented size with 4 mm arterial punch subsequently.  This was constructed in a side aorta end vein graft fashion with running 6-0 Prolene suture. An AC  was placed.  The graft was assessed for lay which was excellent. It is without tension or torsion.     During a dose of cardioplegia, a pericardial slit left lateral was made while dividing associate pericardiophrenic fat and vasculature while being mindful of the lay of the phrenic nerve. As such I proceeded to obtain control proximally onto the mammary artery and spatulated it distally. I grafted this onto the LAD following coronary arteriotomy using previous described techniques. The anastomosis was constructed in an end-to-side orientation with running 8-0 Prolene suture. It is as per operative findings and the anastomosis was hemostatic. I did tack the mammary artery pedicle to the anterior aspect heart with 6-0 Prolene sutures.      With that being accomplished, a terminal hotshot was administered. He was placed in trendelenburg position. Upon completion of terminal hotshot and placement of temporary epicardial pacing wires, with the aortic vent on high and pump flows diminished, the aortic cross-clamp was released. With that, full support was implemented.  A perfusable rhythm spontaneously returned. A nonworking beating phase was implemented. Ventilation restored. I surveyed each graft and each anastomosis was hemostatic and had excellent lay. With all in readiness, the heart was allowed to fill.  De-airing maneuvers were performed as guided by transesophageal echocardiography. With that the heart was decompressed and we removed the aortic root vent/cardioplegia cannula set. Its associated pursestring suture was tied securely and this was reinforced as per matter of routine. The table was now placed in neutral position. With all in readiness, we proceeded to wean from and separate from cardiopulmonary bypass. I did decannulate the venous line and snared down its associated pursestring suture. Systemic intravenous protamine was administered. All associated blood volume was returned to the patient. With continued good hemodynamics, I decannulate the arterial line and tied down it associated pursestring sutures. At this time I did tie down the previously snared pursestring suture. The mediastinum was drained with 24 Chinese Josias drains placed anteriorly and posteriorly. I surveyed the chest and hemostasis was pristine. With that I impregnated the sternal edges with vancomycin, thrombin, and Gelfoam paste. The sternum was reapproximated with stainless sterile wires placed in an interrupted fashion. In layers anatomically the soft tissue planes were reapproximated. Instruments, sharps, and sponge counts were reported as correct.      Complications: None     Disposition: Transferred to ICU in stable and guarded condition.      Electronically signed by Richard Orona MD at 3/18/2019 11:23 AM          Physician Progress Notes (most recent note)      Zeynep Cruz APRN at 3/20/2019 10:42 AM          CABG x 2, Right leg open vein harvest, placement of right femoral arterial line    POD 6    Up in the chair. On room air. Walking 200 feet with PT. Poor appetite but drinking glucerna shakes.  (+) BM. Telemetry: NSR 86-98, no afib. Nursing reports intermittent confusion.     Visit Vitals  /75 (BP Location: Right arm, Patient Position: Sitting)   Pulse 92   Temp 98.8 °F (37.1 °C) (Oral)   Resp 18   Ht 172.7  "cm (67.99\")   Wt 92.8 kg (204 lb 9 oz)   SpO2 94%   BMI 31.11 kg/m²   Baseline weight: 194  RA    Intake/Output Summary (Last 24 hours) at 3/20/2019 1042  Last data filed at 3/20/2019 0901  Gross per 24 hour   Intake 1102.2 ml   Output 105 ml   Net 997.2 ml     Josias drain: 105 ml    Labs:      Chest x ray: no pneumothorax, improving bibasilar atelectasis, large hiatal hernia noted in right chest    Physical Exam:  General: No apparent distress. Up in the chair.  Cardiovascular: Regular rate and rhythm without murmur, rubs, or gallops.    Pulmonary: Clear to auscultation bilaterally without wheezing, rubs, or rales.  Chest: Sternotomy incision clean, dry, and intact. Sternum stable. No clicks. Josias drain x 1 clean, dry, and intact with serosanguinous output.   Abdomen: Soft, non-distended, and non-tender.  Extremities: Warm, moves all extremities. Saphenectomy site clean, dry, and intact   Neurologic: Grossly intact with no focal deficits.    Impression:  Coronary artery disease status post CABG   Left ventricular dysfuction  Type 2 diabetes mellitus  Hypertension  History of stroke  Restrictive lung disease  Hiatal hernia, large  Physical debility  Paroxysmal atrial fibrillation, currently NSR    Plan:  Increase BB   Continue to encourage oral intake  Continue to push pulmonary toilet and ambulation  Continue josias drain  Continue routine post cardiac surgery regimen  DC soon, likely home with home health     Electronically signed by Zeynep Cruz APRN at 3/20/2019 10:50 AM          Consult Notes (most recent note)      Rashaad Loera MD at 3/13/2019 11:46 AM      Consult Orders    1. Inpatient Pulmonology Consult [[id number]] ordered by Zeynep Cruz APRN at 19 1116                      PULMONARY & CRITICAL CARE CONSULT - [facility]    19, 11:46 AM  Patient Care Team:  Joshua Griggs MD as PCP - General (Family Medicine)  Name: Gerson Wilhelm  : " 1945  MRN: [id number]  Contact Serial Number [id number]    Chief complaint: Dyspnea with exertion and need for preoperative pulmonary evaluation prior to possible coronary artery bypass graft surgery.  HPI:  We have been consulted by Markell Obrien MD to see this 73 y.o. male born on 1945. The patient had undergone cardiac catheterization by Dr. Obrien did have significant disease of the LAD.  A stent could not be placed and he is now being considered for bypass surgery.  He does have significant decrease in his left ventricular ejection fraction.  He has a former smoker having quit many years ago.  He has worked around asbestos.  He does state he has been told he snores and based on his body habitus could have underlying sleep apnea.  Bedside spirometry showed a significant restrictive defect and this was confirmed on complete pulmonary functions although his FVC and FEV1 were improved compared to his bedside spirometry.  He does have a large hiatal hernia noted on chest CT and I suspect his restriction relates in part to that and also relates to his weight.  I also s   Past Medical History:   has a past medical history of CHF (congestive heart failure) (CMS/[facility]), Diabetes mellitus (CMS/[facility]), GERD (gastroesophageal reflux disease), GI bleed, Hiatal hernia, Kidney stones, and Stroke (CMS/[facility]).   has a past surgical history that includes Cardiac Catheterization/Vascular Study (Left, 3/11/2019); Functional Flow Reserve (3/11/2019); and Left ventriculography (N/A, 3/11/2019).  No Known Allergies  Medications:    aspirin 81 mg Oral Daily   atorvastatin 40 mg Oral Nightly   carvedilol 3.125 mg Oral BID With Meals   escitalopram 20 mg Oral Daily   ferrous sulfate 325 mg Oral BID   furosemide 40 mg Oral Daily   glipiZIDE 10 mg Oral Daily   insulin detemir 34 Units Subcutaneous Nightly   pantoprazole 40 mg Oral Q AM   ramipril 10 mg Oral Daily   rOPINIRole 4 mg Oral Nightly       DOPamine 2-20 mcg/kg/min Last  Rate: Stopped (03/12/19 0813)   sodium chloride 50 mL/hr Last Rate: 50 mL/hr (03/13/19 0040)     Family History:  History reviewed. No pertinent family history.  Social History:   reports that he has quit smoking. He uses smokeless tobacco. He reports that he does not drink alcohol or use drugs.  Review of Systems:  Review of Systems   Constitutional: Negative for chills and fever.   HENT: Negative.    Respiratory: Positive for shortness of breath.    Cardiovascular: Negative for chest pain.   Gastrointestinal: Negative.    Genitourinary: Negative.    Musculoskeletal: Negative.    Skin: Negative.    Neurological: Negative.    Psychiatric/Behavioral: Positive for sleep disturbance.        He does have a history of snoring.      Physical Exam:  Temp:  [97.2 °F (36.2 °C)-98.8 °F (37.1 °C)] 97.2 °F (36.2 °C)  Heart Rate:  [] 79  Resp:  [18-28] 22  BP: (102-161)/(57-99) 107/57    Intake/Output Summary (Last 24 hours) at 3/13/2019 1146  Last data filed at 3/13/2019 0811  Gross per 24 hour   Intake 1907 ml   Output 900 ml   Net 1007 ml         03/12/19  0005 03/12/19  1343 03/13/19  0525   Weight: 88.6 kg (195 lb 6.4 oz) (ccu bed, lowest position hob 30) 88.6 kg (195 lb 5.2 oz) 88.5 kg (195 lb)     SpO2 Percentage    03/13/19 0830 03/13/19 1055 03/13/19 1105   SpO2: 95% 94% 95%     Physical Exam   Constitutional: He is oriented to person, place, and time.   He is a pleasant somewhat overweight white male who appears in no acute distress.   HENT:   Head: Normocephalic.   He has some crowding of the posterior pharynx.   Eyes: EOM are normal. Pupils are equal, round, and reactive to light.   Neck: Normal range of motion. Neck supple.   Cardiovascular: Normal rate, regular rhythm and normal heart sounds.   Pulmonary/Chest: Effort normal.   Breath sounds are somewhat diminished at the bases.   Abdominal: Soft.   Musculoskeletal: Normal range of motion.   Lymphadenopathy:   No adenopathy is palpated.   Neurological: He is  alert and oriented to person, place, and time.   Skin: Skin is warm and dry.   Psychiatric: He has a normal mood and affect.   Nursing note and vitals reviewed.    Results from last 7 days   Lab Units 03/12/19  0321 03/11/19  2346 03/11/19  1235   WBC 10*3/mm3 10.41 10.57 8.25   HEMOGLOBIN g/dL 13.0* 11.0* 13.9*   PLATELETS 10*3/mm3 226 181 207     Results from last 7 days   Lab Units 03/12/19  0321 03/11/19  1235   SODIUM mmol/L 140 140   POTASSIUM mmol/L 5.0 4.5   CO2 mmol/L 28.0 29.0   BUN mg/dL 18 18   CREATININE mg/dL 1.30 1.00   GLUCOSE mg/dL 269* 256*     Results from last 7 days   Lab Units 03/12/19  1629 03/11/19  2330   PH, ARTERIAL pH units 7.396 7.375   PCO2, ARTERIAL mm Hg 43.0 44.0   PO2 ART mm Hg 64.4* 84.5     No results found for: PROBNP     Recent radiology:   Imaging Results (last 72 hours)     Procedure Component Value Units Date/Time    CT Angiogram Chest With Contrast [[id number]] Collected:  03/12/19 1610     Updated:  03/12/19 1619    Narrative:       CT ANGIOGRAM CHEST W CONTRAST- 3/12/2019 3:57 PM CDT     HISTORY: Aortic dz, non-traumatic, known or suspect      COMPARISON: CT abdomen 11/2/2018     DOSE LENGTH PRODUCT: 595 mGy cm. Automated exposure control was also  utilized to decrease patient radiation dose.     TECHNIQUE: Axial images the chest are obtained following IV contrast.  2-D and maximal intensity projection images are reconstructed and  reviewed.     FINDINGS:  There are no pulmonary emboli. There is mild thoracic aortic  calcification with moderate coronary artery calcification. There is a  large hiatal hernia, similar to the 2018 CT exam. Heart is upper limits  of normal in size. No pericardial or pleural effusions are visualized.  There are nonspecific subcentimeter size mediastinal lymph nodes. Few  calcified mediastinal and right hilar lymph nodes also noted. There is  no axillary lymphadenopathy.     Limited images of the upper abdomen demonstrate hyperdense  material  within the lumen of gallbladder. This could be vicarious excretion of  contrast into the gallbladder versus biliary sludge. The adrenal glands  are similar to the 2018 study. There are left renal cysts.     There is chronic peripheral interstitial lung change suspected. There is  patchy bibasilar atelectasis. There is volume loss of the right  hemithorax related to the large hernia. No endobronchial lesions are  identified. There is no pneumothorax.       Impression:       1. Large hiatal hernia with bibasilar atelectasis. Chronic peripheral  interstitial lung change.  2. No pulmonary emboli.  3. No thoracic aortic aneurysm or dissection. Mild thoracic aortic  calcification with moderate coronary artery calcification.   4. Hyperdense material within the lumen of the gallbladder could be  vicarious excretion of contrast. Biliary sludge second possibility.  This report was finalized on 03/12/2019 16:16 by Dr. Mini Hyde MD.    US Carotid Bilateral [[id number]] Collected:  03/12/19 1449     Updated:  03/12/19 1453    Narrative:       History: Carotid occlusive disease       Impression:       Impression:  1. There is less than 50% stenosis of the right internal carotid artery.  2. There is less than 50% stenosis of the left internal carotid artery.  3. Antegrade flow is demonstrated in bilateral vertebral arteries.     Comments: Bilateral carotid vertebral arterial duplex scan was  performed.     Grayscale imaging shows intimal thickening and calcified elements at the  carotid bifurcation. The right internal carotid artery peak systolic  velocity is 54.5 cm/sec. The end-diastolic velocity is 19.3 cm/sec. The  right ICA/CCA ratio is approximately 0.75 . These findings correlate  with less than 50% stenosis of the right internal carotid artery.     Grayscale imaging shows intimal thickening and calcified elements at the  carotid bifurcation. The left internal carotid artery peak systolic  velocity is 54.2  cm/sec. The end-diastolic velocity is 16.5 cm/sec. The  left ICA/CCA ratio is approximately 0.78 . These findings correlate with  less than 50% stenosis of the left internal carotid artery.     Antegrade flow is demonstrated in bilateral vertebral arteries.       This report was finalized on 03/12/2019 14:50 by Dr. Carlitos Muñiz MD.    XR Chest 1 View [[id number]] Collected:  03/12/19 0748     Updated:  03/12/19 0753    Narrative:       EXAMINATION: XR CHEST 1 VW-     3/11/2019 11:40 PM CDT     HISTORY: rrt; R06.09-Other forms of dyspnea.     One view chest x-ray compared with 11/2/2018.     Magnified heart size. Aortic arch calcification.     Chronic opacity at the right lung base was seen to represent  intrathoracic stomach on a CT exam from 4 months ago.     The upper lobes are essentially clear.     There is no pneumothorax and no overt heart failure.     Summary:  1. Intrathoracic stomach with obscuration of the right lung base. No  definite pneumonia, pneumothorax, or heart failure.  This report was finalized on 03/12/2019 07:50 by Dr. Cuong Dias MD.    XR Abdomen KUB [[id number]] Collected:  03/12/19 0716     Updated:  03/12/19 0722    Narrative:       EXAMINATION: XR ABDOMEN KUB- 3/12/2019 7:16 AM CDT     HISTORY: nausea/ vomiting; post cath; R06.09-Other forms of dyspnea.     REPORT: A supine view of the abdomen is compared with CT of the abdomen  and pelvis 11/2/2018.     Moderate stool volume is present. There is mild dilation of the long  loop of the colon centrally, the maximum diameter of approximately 8.8  cm. No free air is identified on the supine view. Contrast is noted  within the urinary tract bladder. There is mild levoscoliosis of the  lumbar spine. No acute osseous findings.       Impression:       Nonspecific bowel gas pattern with mild dilation of a single  loop of colon centrally. Probable constipation.  This report was finalized on 03/12/2019 07:19 by Dr. Renny Whitman MD.        My  radiograph interpretation/independent review of imaging: Chest CT does show evidence of a large hiatal hernia with some bilateral basilar atelectatic changes.  Other test results (not lab or imaging): Pulmonary function studies reveal a moderate bordering on severe restrictive ventilatory defect.  Independent review of ekg: Sinus bradycardia with left anterior fascicular block and some ST and T wave changes inferiorly and laterally.  Problem List as identified by Epic (may contain historical, inactive problems)  Patient Active Problem List   Diagnosis   • ENNIS (dyspnea on exertion)   • Coronary artery disease involving native coronary artery of native heart without angina pectoris   • History of stroke   • Type 2 diabetes mellitus with circulatory disorder, with long-term current use of insulin (CMS/[facility])   • Essential hypertension   • Smokeless tobacco use     Pulmonary Assessment:  New problem (to me), with additional workup planned:  1.  Restrictive lung disease likely related to his large hiatal hernia and probably also related to some extent to his weight and some associated atelectasis.  2.  Snoring, he may very well have sleep apnea.  New problem (to me), no additional workup planned:  1.  Type 2 diabetes.  Other problems either stable, failing to improve or worsenin. Coronary artery disease.  2. Decreased LV function.    Recommend/plan:   · I have ordered flutter valve therapy and he may continue this along with incentive spirometry.  He will be at some increased risk for postoperative pulmonary complication because of his restrictive lung disease but I certainly think he is an acceptable candidate for bypass surgery and I did discuss this with Dr. Orona.  His blood gases did show some degree of hypoxemia but no CO2 retention.  Again, I think his restriction relates predominantly to anatomic factors and not any intrinsic pulmonary parenchymal disease other than possibly some atelectasis.    Thank you for  this consult.  We will follow along.  Electronically signed by Rashaad Loera MD, 19, 11:46 AM          Electronically signed by Rashaad Loera MD at 3/13/2019  6:26 PM          Physical Therapy Notes (most recent note)      Lm Hernandez, PTA at 3/20/2019  1:51 PM  Version 1 of 1         Acute Care - Physical Therapy Treatment Note   [place]     Patient Name: Gerson Wilhelm  : 1945  MRN: [id number]  Today's Date: 3/20/2019  Onset of Illness/Injury or Date of Surgery: 19  Date of Referral to PT: 19  Referring Physician: Dr Orona    Admit Date: 3/11/2019    Visit Dx:    ICD-10-CM ICD-9-CM   1. ENNIS (dyspnea on exertion) R06.09 786.09   2. Impaired mobility Z74.09 799.89   3. Coronary artery disease involving native coronary artery of native heart without angina pectoris I25.10 414.01     Patient Active Problem List   Diagnosis   • ENNIS (dyspnea on exertion)   • Coronary artery disease involving native coronary artery of native heart without angina pectoris   • History of stroke   • Type 2 diabetes mellitus with circulatory disorder, with long-term current use of insulin (CMS/HCC)   • Essential hypertension   • Smokeless tobacco use   • Paroxysmal atrial fibrillation (CMS/HCC)   • Chronic systolic heart failure (CMS/HCC)   • Restrictive lung disease   • Hiatal hernia   • Obesity (BMI 30-39.9)       Therapy Treatment    Rehabilitation Treatment Summary     Row Name 19 1308 19 1048          Treatment Time/Intention    Discipline  physical therapy assistant  -LG  physical therapy assistant  -AE     Document Type  therapy note (daily note)  -LG  therapy note (daily note)  -AE     Subjective Information  complains of;pain  -LG  no complaints  -AE     Mode of Treatment  individual therapy;physical therapy  -LG  --     Patient/Family Observations  wife present at end of treatment  -LG  --     Comment  Responded to chair alarm. Pt had gotten himself up from chair and  walked across floor to another chair. Pt strongly educated on fall risks and advised to call and ask for assistance if he needs or wants to move.   -LG  --     Existing Precautions/Restrictions  fall;sternal  -LG  fall;sternal  -AE     Treatment Considerations/Comments  --  -- Pt seemed very irritable today  -AE     Recorded by [LG] Lm Hernandez, PTA 03/20/19 1335 [AE] Vanessa Jordan, PTA 03/20/19 1322     Row Name 03/20/19 1048             Vital Signs    Pre SpO2 (%)  93  -AE      O2 Delivery Pre Treatment  room air  -AE      Post SpO2 (%)  99  -AE      O2 Delivery Post Treatment  room air  -AE      Recorded by [AE] Vanessa Jordan, PTA 03/20/19 1322      Row Name 03/20/19 1308 03/20/19 1048          Bed Mobility Assessment/Treatment    Comment (Bed Mobility)  up in chair  -LG  up in chair  -AE     Recorded by [LG] Lm Hernandez, PTA 03/20/19 1335 [AE] Vanessa Jordan, PTA 03/20/19 1322     Row Name 03/20/19 1308 03/20/19 1048          Sit-Stand Transfer    Sit-Stand [place] (Transfers)  verbal cues;minimum assist (75% patient effort);moderate assist (50% patient effort)  -LG  contact guard;verbal cues wife assited  -AE     Recorded by [LG] Lm Hernandez, PTA 03/20/19 1335 [AE] Vanessa Jordan, PTA 03/20/19 1322     Row Name 03/20/19 1308 03/20/19 1048          Stand-Sit Transfer    Stand-Sit [place] (Transfers)  verbal cues;contact guard;minimum assist (75% patient effort)  -LG  contact guard;verbal cues wife assited  -AE     Recorded by [LG] Lm Hernandez, PTA 03/20/19 1335 [AE] Vanessa Jordan, PTA 03/20/19 1322     Row Name 03/20/19 1308 03/20/19 1048          Gait/Stairs Assessment/Training    Gait/Stairs Assessment/Training  distance ambulated  -LG  --     [place] Level (Gait)  verbal cues;minimum assist (75% patient effort)  -LG  contact guard;minimum assist (75% patient effort)  -AE     Assistive Device (Gait)  -- HHA x 1  -LG  --     Distance in Feet (Gait)  25' standing rest, 25' standing  rets, 30' standing rest 20 feet back to room.   -LG  200  -AE     Pattern (Gait)  step-to  -LG  --     Deviations/Abnormal Patterns (Gait)  base of support, narrow;iban decreased;festinating/shuffling;stride length decreased;scissoring  -LG  stride length decreased  -AE     Bilateral Gait Deviations  forward flexed posture  -LG  forward flexed posture  -AE     Handrail Location (Stairs)  --  -- practiced steps in stairwell  -AE     Number of Steps (Stairs)  --  -- Attempted to try steps but pt was unable to safely perform   -AE     Comment (Gait/Stairs)  --  x 1 LOB going down small step.  -AE     Recorded by [LG] Lm Hernandez, PTA 03/20/19 1335 [AE] Vanessa Jordan, PTA 03/20/19 1322     Row Name 03/20/19 1308             Therapeutic Exercise    Comment (Therapeutic Exercise)  Cardiac Protocol x 20  -LG      Recorded by [LG] Lm Hernandez, PTA 03/20/19 1335      Row Name 03/20/19 1308 03/20/19 1048          Positioning and Restraints    Pre-Treatment Position  sitting in chair/recliner  -LG  sitting in chair/recliner  -AE     Post Treatment Position  chair  -LG  chair  -AE     In Chair  reclined;legs elevated;call light within reach;encouraged to call for assist;with family/caregiver;exit alarm on  -LG  sitting;call light within reach  -AE     Recorded by [LG] Lm Hernandez, PTA 03/20/19 1335 [AE] Vanessa Jordan, PTA 03/20/19 1322     Row Name 03/20/19 1308 03/20/19 1048          Pain Scale: Numbers Pre/Post-Treatment    Pain Scale: Numbers, Pretreatment  8/10  -LG  0/10 - no pain  -AE     Pain Scale: Numbers, Post-Treatment  8/10  -LG  0/10 - no pain  -AE     Pain Location - Side  Left  -LG  --     Pain Location  flank  -LG  --     Pain Intervention(s)  Medication (See MAR);Repositioned;Ambulation/increased activity  -LG  --     Recorded by [LG] Lm Hernandez, PTA 03/20/19 1335 [AE] Vanessa Jordan, PTA 03/20/19 1322     Row Name                Wound 03/14/19 0856 Other (See comments) chest incision    Wound  - Properties Group Date first assessed: 03/14/19 [DS] Time first assessed: 0856 [DS] Side: Other (See comments) [DS] Location: chest [DS] Type: incision [DS] Recorded by:  [DS] Bert Isaac RN 03/14/19 0856    Row Name                Wound 03/14/19 0856 Left leg incision    Wound - Properties Group Date first assessed: 03/14/19 [DS] Time first assessed: 0856 [DS] Side: Left [DS] Location: leg [DS] Type: incision [DS] Recorded by:  [DS] Bert Isaac RN 03/14/19 0856      User Key  (r) = Recorded By, (t) = Taken By, (c) = Cosigned By    Initials Name Effective Dates Discipline    AE Vanessa Jordan, PTA 06/22/15 -  PT    LG Lm Hernandez, PTA 08/02/16 -  PT    Bert Blanchard RN 08/02/16 -  Nurse          Wound 03/14/19 0856 Other (See comments) chest incision (Active)   Dressing Appearance open to air 3/20/2019  7:50 AM   Closure Approximated 3/20/2019  7:50 AM   Base dry;clean 3/20/2019  7:50 AM   Drainage Amount none 3/20/2019  7:50 AM   Dressing Care, Wound open to air 3/19/2019  7:32 PM       Wound 03/14/19 0856 Left leg incision (Active)   Dressing Appearance open to air 3/20/2019  7:50 AM   Closure Approximated 3/20/2019  7:50 AM   Base dry;clean 3/20/2019  7:50 AM   Drainage Amount none 3/20/2019  7:50 AM   Dressing Care, Wound open to air 3/20/2019  7:50 AM       Wound midline;lower chest puncture (Active)   Dressing Appearance dry;intact 3/20/2019  7:50 AM   Closure RICHIE 3/20/2019  7:50 AM   Base dressing in place, unable to visualize 3/20/2019  7:50 AM   Drainage Amount none 3/20/2019  7:50 AM           Physical Therapy Education     Title: PT OT SLP Therapies (In Progress)     Topic: Physical Therapy (In Progress)     Point: Mobility training (In Progress)     Learning Progress Summary           Patient Acceptance, E, NR by DARREN at 3/15/2019  8:15 AM    Comment:  Educated pt. on progression of PT POC, benefits of activity, sternal precautions.    Acceptance, E, FALGUNI TAO,NR by DARREN at 3/13/2019  2:30  PM    Comment:  Educated pt/spouse on progression of PT POC and benefits of activity. Answered pre questions about open heart surgery being performed tomorrow and educated on sternal precautions.   Significant Other Acceptance, E, VU,DU,NR by DARREN at 3/13/2019  2:30 PM    Comment:  Educated pt/spouse on progression of PT POC and benefits of activity. Answered pre questions about open heart surgery being performed tomorrow and educated on sternal precautions.                   Point: Precautions (In Progress)     Learning Progress Summary           Patient Acceptance, E, NR by DARREN at 3/15/2019  8:15 AM    Comment:  Educated pt. on progression of PT POC, benefits of activity, sternal precautions.                               User Key     Initials Effective Dates Name Provider Type Discipline    DARREN 08/02/16 -  Dinh Melgar PT DPT Physical Therapist PT                PT Recommendation and Plan     Plan of Care Reviewed With: patient, spouse  Progress: improving  Outcome Summary: Pt remians very impulsive and does not recognize the serioiusness of falling. I walked in on chair alarm sounding, pt had gotten himself up out of one chair and walked himself across the room to another chair with a blanket wrapped around his legs/feet.  Pt stronglly educated on safety and asking for assistance before walking. Pt is Min-Mod  sit to stand, he is very unstable on his feet and reaches out for doorways and objects in paul.. Pt is Min A for Gait of 25' before having to stop due to fatigue and loss of balance. He scissors his feet and tries to reach out and lean against wall.  Pt and pt's wife educated on safety and the impact a fall will have on his recovery.  I strongly suggested short term rehab due to impaired balance, strength and decreased safety choices. Pt 's wife states she can stay home with him for  short while but cannot do extended care.  Wife was receptive to [facility] or [facility].      Time Calculation:   PT Charges     Row Name 03/20/19 1330 03/20/19 1308          Time Calculation    Start Time  1048  -AE  1308  -LG     Stop Time  1113  -AE  1350  -LG     Time Calculation (min)  25 min  -AE  42 min  -LG     PT Received On  03/20/19  -AE  03/20/19  -LG     PT Goal Re-Cert Due Date  03/25/19  -AE  03/25/19  -LG        Time Calculation- PT    Total Timed Code Minutes- PT  25 minute(s)  -AE  42 minute(s)  -LG        Timed Charges    29813 - Gait Training Minutes   25  -AE  --       User Key  (r) = Recorded By, (t) = Taken By, (c) = Cosigned By    Initials Name Provider Type    AE Vanessa Jordan PTA Physical Therapy Assistant    LG Lm Hernandez PTA Physical Therapy Assistant        Therapy Charges for Today     Code Description Service Date Service Provider Modifiers Qty    72407217590 HC GAIT TRAINING EA 15 MIN 3/19/2019 Lm Hernandez PTA GP 1    37040426213 HC PT THERAPEUTIC ACT EA 15 MIN 3/19/2019 Lm Hernandez PTA GP 1    66968360043 HC PT THER PROC EA 15 MIN 3/19/2019 Lm Hernandez, PTA GP 1    07366813427 HC GAIT TRAINING EA 15 MIN 3/19/2019 Lm Hernandez, PTA GP 1    83232821217 HC PT THER PROC EA 15 MIN 3/19/2019 Lm Hernandez, PTA GP 1    09505897293 HC GAIT TRAINING EA 15 MIN 3/20/2019 Lm Hernandez PTA GP 1    54394878440 HC PT THERAPEUTIC ACT EA 15 MIN 3/20/2019 Lm Hernandez PTA GP 1    49184135149 HC PT THER PROC EA 15 MIN 3/20/2019 Lm Hernandez, PTA GP 1          PT G-Codes  Outcome Measure Options: AM-PAC 6 Clicks Basic Mobility (PT)  AM-PAC 6 Clicks Score: 11    Lm Hernandez PTA  3/20/2019         Electronically signed by Lm Hernandez PTA at 3/20/2019  1:51 PM       Occupational Therapy Notes (most recent note)     No notes of this type exist for this encounter.

## 2019-03-21 LAB
GLUCOSE BLDC GLUCOMTR-MCNC: 100 MG/DL (ref 70–130)
GLUCOSE BLDC GLUCOMTR-MCNC: 144 MG/DL (ref 70–130)
GLUCOSE BLDC GLUCOMTR-MCNC: 168 MG/DL (ref 70–130)
GLUCOSE BLDC GLUCOMTR-MCNC: 198 MG/DL (ref 70–130)

## 2019-03-21 PROCEDURE — 97116 GAIT TRAINING THERAPY: CPT

## 2019-03-21 PROCEDURE — 94760 N-INVAS EAR/PLS OXIMETRY 1: CPT

## 2019-03-21 PROCEDURE — 99024 POSTOP FOLLOW-UP VISIT: CPT | Performed by: NURSE PRACTITIONER

## 2019-03-21 PROCEDURE — 97530 THERAPEUTIC ACTIVITIES: CPT

## 2019-03-21 PROCEDURE — 82962 GLUCOSE BLOOD TEST: CPT

## 2019-03-21 PROCEDURE — 25010000002 ENOXAPARIN PER 10 MG: Performed by: NURSE PRACTITIONER

## 2019-03-21 PROCEDURE — 94799 UNLISTED PULMONARY SVC/PX: CPT

## 2019-03-21 PROCEDURE — 63710000001 INSULIN LISPRO (HUMAN) PER 5 UNITS: Performed by: THORACIC SURGERY (CARDIOTHORACIC VASCULAR SURGERY)

## 2019-03-21 RX ORDER — AMIODARONE HYDROCHLORIDE 200 MG/1
400 TABLET ORAL EVERY 12 HOURS SCHEDULED
Status: DISCONTINUED | OUTPATIENT
Start: 2019-03-21 | End: 2019-03-22 | Stop reason: HOSPADM

## 2019-03-21 RX ORDER — RAMIPRIL 5 MG/1
5 CAPSULE ORAL
Status: DISCONTINUED | OUTPATIENT
Start: 2019-03-21 | End: 2019-03-22 | Stop reason: HOSPADM

## 2019-03-21 RX ORDER — ESCITALOPRAM OXALATE 10 MG/1
10 TABLET ORAL NIGHTLY
Status: DISCONTINUED | OUTPATIENT
Start: 2019-03-21 | End: 2019-03-22 | Stop reason: HOSPADM

## 2019-03-21 RX ADMIN — IPRATROPIUM BROMIDE AND ALBUTEROL SULFATE 3 ML: 2.5; .5 SOLUTION RESPIRATORY (INHALATION) at 20:47

## 2019-03-21 RX ADMIN — IPRATROPIUM BROMIDE AND ALBUTEROL SULFATE 3 ML: 2.5; .5 SOLUTION RESPIRATORY (INHALATION) at 07:58

## 2019-03-21 RX ADMIN — BISACODYL 10 MG: 5 TABLET ORAL at 20:41

## 2019-03-21 RX ADMIN — CLOPIDOGREL 75 MG: 75 TABLET, FILM COATED ORAL at 17:25

## 2019-03-21 RX ADMIN — LIDOCAINE 2 PATCH: 50 PATCH CUTANEOUS at 08:30

## 2019-03-21 RX ADMIN — POTASSIUM CHLORIDE 20 MEQ: 750 CAPSULE, EXTENDED RELEASE ORAL at 17:25

## 2019-03-21 RX ADMIN — ENOXAPARIN SODIUM 30 MG: 30 INJECTION SUBCUTANEOUS at 20:41

## 2019-03-21 RX ADMIN — ESCITSLOPRAM 10 MG: 10 TABLET ORAL at 20:41

## 2019-03-21 RX ADMIN — METFORMIN HYDROCHLORIDE 1000 MG: 500 TABLET ORAL at 17:23

## 2019-03-21 RX ADMIN — GLIPIZIDE 2.5 MG: 5 TABLET ORAL at 17:25

## 2019-03-21 RX ADMIN — METOPROLOL TARTRATE 50 MG: 50 TABLET ORAL at 20:41

## 2019-03-21 RX ADMIN — OXYCODONE AND ACETAMINOPHEN 1 TABLET: 5; 325 TABLET ORAL at 09:13

## 2019-03-21 RX ADMIN — POTASSIUM CHLORIDE 20 MEQ: 750 CAPSULE, EXTENDED RELEASE ORAL at 08:30

## 2019-03-21 RX ADMIN — METFORMIN HYDROCHLORIDE 1000 MG: 500 TABLET ORAL at 08:30

## 2019-03-21 RX ADMIN — PANTOPRAZOLE SODIUM 40 MG: 40 TABLET, DELAYED RELEASE ORAL at 05:20

## 2019-03-21 RX ADMIN — INSULIN LISPRO 2 UNITS: 100 INJECTION, SOLUTION INTRAVENOUS; SUBCUTANEOUS at 20:41

## 2019-03-21 RX ADMIN — AMIODARONE HYDROCHLORIDE 400 MG: 200 TABLET ORAL at 08:30

## 2019-03-21 RX ADMIN — OXYCODONE AND ACETAMINOPHEN 1 TABLET: 5; 325 TABLET ORAL at 02:30

## 2019-03-21 RX ADMIN — GLIPIZIDE 2.5 MG: 5 TABLET ORAL at 08:31

## 2019-03-21 RX ADMIN — METOPROLOL TARTRATE 50 MG: 50 TABLET ORAL at 08:30

## 2019-03-21 RX ADMIN — ENOXAPARIN SODIUM 30 MG: 30 INJECTION SUBCUTANEOUS at 08:54

## 2019-03-21 RX ADMIN — INSULIN LISPRO 2 UNITS: 100 INJECTION, SOLUTION INTRAVENOUS; SUBCUTANEOUS at 08:55

## 2019-03-21 RX ADMIN — ASPIRIN 81 MG: 81 TABLET, DELAYED RELEASE ORAL at 08:30

## 2019-03-21 RX ADMIN — RAMIPRIL 5 MG: 5 CAPSULE ORAL at 08:54

## 2019-03-21 RX ADMIN — ATORVASTATIN CALCIUM 20 MG: 10 TABLET, FILM COATED ORAL at 20:41

## 2019-03-21 RX ADMIN — AMIODARONE HYDROCHLORIDE 400 MG: 200 TABLET ORAL at 20:41

## 2019-03-21 NOTE — THERAPY TREATMENT NOTE
Acute Care - Physical Therapy Treatment Note  [facility]     Patient Name: Gerson Wilhelm  : 1945  MRN: [id number]  Today's Date: 3/21/2019  Onset of Illness/Injury or Date of Surgery: 19  Date of Referral to PT: 19  Referring Physician: Dr Orona    Admit Date: 3/11/2019    Visit Dx:    ICD-10-CM ICD-9-CM   1. ENNIS (dyspnea on exertion) R06.09 786.09   2. Impaired mobility Z74.09 799.89   3. Coronary artery disease involving native coronary artery of native heart without angina pectoris I25.10 414.01     Patient Active Problem List   Diagnosis   • ENNIS (dyspnea on exertion)   • Coronary artery disease involving native coronary artery of native heart without angina pectoris   • History of stroke   • Type 2 diabetes mellitus with circulatory disorder, with long-term current use of insulin (CMS/HCC)   • Essential hypertension   • Smokeless tobacco use   • Paroxysmal atrial fibrillation (CMS/HCC)   • Chronic systolic heart failure (CMS/HCC)   • Restrictive lung disease   • Hiatal hernia   • Obesity (BMI 30-39.9)       Therapy Treatment    Rehabilitation Treatment Summary     Row Name 19 1457 19 0924          Treatment Time/Intention    Discipline  physical therapy assistant  -LG  physical therapy assistant  -LG     Document Type  therapy note (daily note)  -LG  therapy note (daily note)  -LG     Subjective Information  no complaints  -LG2  complains of;weakness;fatigue;pain  -LG2     Mode of Treatment  individual therapy;physical therapy  -LG2  individual therapy;physical therapy  -LG2     Patient/Family Observations  family friend bedside, wife in meeting  -LG2  pts wife bedside  -LG2     Comment  Pt in much better mood stating he feels better.   -LG2  Had pt's wife to assist with gait and stair clmbing to educated on mobiliity needs and physical assist.   -LG3     Patient Response to Treatment  --  Pt was more aggitated at end of treatment as wife had been telling him if he didn't  work with PT he would not go home.   -LG3     Recorded by [LG] Lm Hernandez BEKAH, PTA 03/21/19 1512  [LG2] Lm Hernandez, PTA 03/21/19 1708 [LG] Lm Hernandez BEKAH, PTA 03/21/19 0925  [LG2] HernandezLm andujar BEKAH, PTA 03/21/19 1656  [LG3] HernandezLm, PTA 03/21/19 1703     Row Name 03/21/19 1457 03/21/19 0924          Bed Mobility Assessment/Treatment    Supine-Sit [place] (Bed Mobility)  verbal cues;minimum assist (75% patient effort)  -LG  --     Sit-Supine [place] (Bed Mobility)  verbal cues;minimum assist (75% patient effort)  -LG  --     Comment (Bed Mobility)  --  up in chair  -LG     Recorded by [LG] Lm Hernandez, PTA 03/21/19 1708 [LG] Lm Hernandez, PTA 03/21/19 1656     Row Name 03/21/19 1457 03/21/19 0924          Sit-Stand Transfer    Sit-Stand [place] (Transfers)  verbal cues;minimum assist (75% patient effort)  -LG  verbal cues;minimum assist (75% patient effort)  -LG     Recorded by [LG] Lm Hernandez, PTA 03/21/19 1708 [LG] Mary Lm GODFREY, PTA 03/21/19 1656     Row Name 03/21/19 1457 03/21/19 0924          Stand-Sit Transfer    Stand-Sit [place] (Transfers)  verbal cues;minimum assist (75% patient effort)  -LG  verbal cues;contact guard;minimum assist (75% patient effort)  -LG     Recorded by [LG] Lm Hernandez, PTA 03/21/19 1708 [LG] Lm Hernandez, PTA 03/21/19 1656     Row Name 03/21/19 1457 03/21/19 0924          Gait/Stairs Assessment/Training    Gait/Stairs Assessment/Training  distance ambulated  -LG  distance ambulated  -LG     [place] Level (Gait)  verbal cues;minimum assist (75% patient effort)  -LG  verbal cues;minimum assist (75% patient effort);moderate assist (50% patient effort);2 person assist  -LG     Assistive Device (Gait)  --  -- HHA/CGA x 2  -LG     Distance in Feet (Gait)  300' with 1 short standing rest break  -LG  40' x 2 reps plus stair trainiing  -LG     Pattern (Gait)  step-to  -LG  step-to  -LG     Deviations/Abnormal Patterns (Gait)  base of support, wide  -LG   base of support, wide  -LG     Bilateral Gait Deviations  lateral trunk flexion  -LG  lateral trunk flexion  -LG     Negotiation (Stairs)  --  maintains weight-bearing status pt pushes hard through hands/ tries to pull hand rail  -LG     Number of Steps (Stairs)  --  3 x 2 reps  -LG     Ascending Technique (Stairs)  --  step-over-step;step-to-step  -LG     Descending Technique (Stairs)  --  step-over-step;step-to-step  -LG     Maintains Weight-bearing Status (Stairs)  --  verbal cues to maintain;unable to maintain weight-bearing status  -LG     Comment (Gait/Stairs)  --  Pt would not listen to safety cues on stairs and tried to step over step, also pushed hard through HHA and attrempted to pull himself up steps using hand rail.   -LG     Recorded by [LG] Lm Hernandez, PTA 03/21/19 1708 [LG] Lm Hernandez, PTA 03/21/19 1703     Row Name 03/21/19 0924             Therapeutic Exercise    Comment (Therapeutic Exercise)  [facility] Protocol x 20 reps  -LG      Recorded by [LG] Lm Hernandez, PTA 03/21/19 1656      Row Name 03/21/19 1457 03/21/19 0924          Positioning and Restraints    Pre-Treatment Position  in bed  -LG  sitting in chair/recliner  -LG     Post Treatment Position  bed  -LG  chair  -LG     In Chair  --  reclined;call light within reach;encouraged to call for assist;with family/caregiver;with nsg  -LG     Recorded by [LG] Lm Hernandez, PTA 03/21/19 1708 [LG] Lm Hernandez, PTA 03/21/19 1703     Row Name 03/21/19 1457 03/21/19 0924          Pain Scale: Numbers Pre/Post-Treatment    Pain Scale: Numbers, Pretreatment  0/10 - no pain  -LG  0/10 - no pain  -LG     Pain Scale: Numbers, Post-Treatment  0/10 - no pain  -LG  0/10 - no pain  -LG     Recorded by [LG] Lm Hernandez, PTA 03/21/19 1708 [LG] Lm Hernandez, PTA 03/21/19 1703     Row Name                Wound 03/14/19 0856 Other (See comments) chest incision    Wound - Properties Group Date first assessed: 03/14/19 [DS] Time first assessed: 0856 [DS]  Side: Other (See comments) [DS] Location: chest [DS] Type: incision [DS] Recorded by:  [DS] Bert Isaac RN 03/14/19 0856    Row Name                Wound 03/14/19 0856 Left leg incision    Wound - Properties Group Date first assessed: 03/14/19 [DS] Time first assessed: 0856 [DS] Side: Left [DS] Location: leg [DS] Type: incision [DS] Recorded by:  [DS] Bert Isaac RN 03/14/19 0856      User Key  (r) = Recorded By, (t) = Taken By, (c) = Cosigned By    Initials Name Effective Dates Discipline    LG Lm Hernandez, WILLIAM 08/02/16 -  PT    Bert Blanchard RN 08/02/16 -  Nurse          Wound 03/14/19 0856 Other (See comments) chest incision (Active)   Dressing Appearance open to air 3/21/2019  7:45 AM   Closure Approximated 3/21/2019  7:45 AM   Base dry;clean 3/21/2019  7:45 AM   Drainage Amount none 3/21/2019  7:45 AM   Care, Wound cleansed with;soap and water 3/21/2019  7:45 AM   Dressing Care, Wound open to air 3/20/2019  7:38 PM       Wound 03/14/19 0856 Left leg incision (Active)   Dressing Appearance open to air 3/21/2019  7:45 AM   Closure Approximated 3/21/2019  7:45 AM   Base dry;clean 3/21/2019  7:45 AM   Drainage Amount none 3/21/2019  7:45 AM   Dressing Care, Wound open to air 3/20/2019  7:38 PM       Wound midline;lower chest puncture (Active)   Dressing Appearance dry;intact 3/21/2019  7:45 AM   Closure None 3/21/2019  7:45 AM   Base dry 3/21/2019  7:45 AM   Drainage Amount none 3/21/2019  7:45 AM   Care, Wound cleansed with;soap and water 3/21/2019  7:45 AM   Dressing Care, Wound dressing changed 3/21/2019  7:45 AM           Physical Therapy Education     Title: PT OT SLP Therapies (In Progress)     Topic: Physical Therapy (In Progress)     Point: Mobility training (In Progress)     Learning Progress Summary           Patient Acceptance, E, NR by DARREN at 3/15/2019  8:15 AM    Comment:  Educated pt. on progression of PT POC, benefits of activity, sternal precautions.    Acceptance, E, DINH,FALGUNI,NR by  DARREN at 3/13/2019  2:30 PM    Comment:  Educated pt/spouse on progression of PT POC and benefits of activity. Answered pre questions about open heart surgery being performed tomorrow and educated on sternal precautions.   Significant Other Acceptance, E, DINH,DU,NR by DARREN at 3/13/2019  2:30 PM    Comment:  Educated pt/spouse on progression of PT POC and benefits of activity. Answered pre questions about open heart surgery being performed tomorrow and educated on sternal precautions.                   Point: Precautions (In Progress)     Learning Progress Summary           Patient Acceptance, E, NR by DARREN at 3/15/2019  8:15 AM    Comment:  Educated pt. on progression of PT POC, benefits of activity, sternal precautions.                               User Key     Initials Effective Dates Name Provider Type Discipline    DARREN 08/02/16 -  Dinh Melgar PT DPT Physical Therapist PT                PT Recommendation and Plan     Plan of Care Reviewed With: patient, spouse  Progress: improving  Outcome Summary: Pt remians very impulsive and does not recognize the serioiusness of falling. I walked in on chair alarm sounding, pt had gotten himself up out of one chair and walked himself across the room to another chair with a blanket wrapped around his legs/feet.  Pt stronglly educated on safety and asking for assistance before walking. Pt is Min-Mod  sit to stand, he is very unstable on his feet and reaches out for doorways and objects in paul.. Pt is Min A for Gait of 25' before having to stop due to fatigue and loss of balance. He scissors his feet and tries to reach out and lean against wall.  Pt and pt's wife educated on safety and the impact a fall will have on his recovery.  I strongly suggested short term rehab due to impaired balance, strength and decreased safety choices. Pt 's wife states she can stay home with him for  short while but cannot do extended care.  Wife was receptive to [facility]  or [facility] ONLY.      Time Calculation:   PT Charges     Row Name 03/21/19 1457 03/21/19 1437 03/21/19 0924       Time Calculation    Start Time  1457  -LG  1437  -LG  0924  -LG    Stop Time  1510  -LG  --  0948  -LG    Time Calculation (min)  13 min  -LG  --  24 min  -LG    PT Received On  03/21/19  -LG  --  03/21/19  -LG    PT Goal Re-Cert Due Date  --  --  03/25/19  -LG       Time Calculation- PT    Total Timed Code Minutes- PT  13 minute(s)  -LG  --  24 minute(s)  -LG      User Key  (r) = Recorded By, (t) = Taken By, (c) = Cosigned By    Initials Name Provider Type    LG Lm Hernandez PTA Physical Therapy Assistant        Therapy Charges for Today     Code Description Service Date Service Provider Modifiers Qty    72361798075 HC GAIT TRAINING EA 15 MIN 3/20/2019 Lm Hernandez, PTA GP 1    99884512450 HC PT THERAPEUTIC ACT EA 15 MIN 3/20/2019 Lm Hernandez PTA GP 1    03470420333 HC PT THER PROC EA 15 MIN 3/20/2019 Lm Hernandez, PTA GP 1    64095426962 HC GAIT TRAINING EA 15 MIN 3/21/2019 Lm Hernandez, WILLIAM GP 1    57264700422 HC PT THERAPEUTIC ACT EA 15 MIN 3/21/2019 Lm Hernandez, PTA GP 1    78131261476 HC GAIT TRAINING EA 15 MIN 3/21/2019 Lm Hernandez PTA GP 1          PT G-Codes  Outcome Measure Options: AM-PAC 6 Clicks Basic Mobility (PT)  AM-PAC 6 Clicks Score: 11    Lm Hernandez PTA  3/21/2019

## 2019-03-21 NOTE — PROGRESS NOTES
"CABG x 2, Right leg open vein harvest, placement of right femoral arterial line    POD 7    Up in the chair. On room air. Patient not sleeping well and appetite is increasing. Needs reminders for sternal precautions and not to get up without assistance. Ambulating with physical therapy but it is noted that short term rehabilitation is recommended for safety. Wife wanting [facility] swing bed due to close proximity to home.     Visit Vitals  /59 (BP Location: Right arm, Patient Position: Sitting)   Pulse 86   Temp 98.7 °F (37.1 °C) (Oral)   Resp 20   Ht 172.7 cm (67.99\")   Wt 93.5 kg (206 lb 3 oz)   SpO2 96%   BMI 31.36 kg/m²   Baseline weight: 194  RA    Intake/Output Summary (Last 24 hours) at 3/21/2019 1055  Last data filed at 3/21/2019 0800  Gross per 24 hour   Intake 1235 ml   Output 420 ml   Net 815 ml     Josias drain: 30 ml    Physical Exam:  General: No apparent distress. Up in the chair.  Cardiovascular: Regular rate and rhythm without murmur, rubs, or gallops.    Pulmonary: Clear to auscultation bilaterally without wheezing, rubs, or rales.  Chest: Sternotomy incision clean, dry, and intact. Sternum stable. No clicks. Josias drain x 1 clean, dry, and intact with serosanguinous output.   Abdomen: Soft, non-distended, and non-tender.  Extremities: Warm, moves all extremities. Saphenectomy site clean, dry, and intact. Lower extremity edema.   Neurologic: Grossly intact with no focal deficits.    Impression:  Coronary artery disease status post CABG   Left ventricular dysfuction  Type 2 diabetes mellitus  Hypertension  History of stroke  Restrictive lung disease  Hiatal hernia, large  Physical debility  Paroxysmal atrial fibrillation, currently NSR    Plan:  Josias drain removed  Continue to encourage oral intake  Continue to push pulmonary toilet and ambulation  Continue routine post cardiac surgery regimen  Discussion with patient and wife regarding disposition. Home is not recommended. Placement " at rehab, [facility] or [facility]. [facility] wanting [facility] bed because it is close to home. Will discuss with case management and social work.

## 2019-03-21 NOTE — PLAN OF CARE
"Problem: Patient Care Overview  Goal: Plan of Care Review  Outcome: Ongoing (interventions implemented as appropriate)   03/21/19 4627   Coping/Psychosocial   Plan of Care Reviewed With patient;spouse   Plan of Care Review   Progress improving   OTHER   Outcome Summary RD nutrition follow-up completed. Pt states that he feel better, but that he wasn't very hungry. Pt's wife was alseep in the recliner, but opened one eye and shook her head \"no\" when I asked if pt had any questions regarding diet infromation for home. Pt is scheduled to go to a swing bed tomorrow.         "

## 2019-03-21 NOTE — PLAN OF CARE
Problem: Patient Care Overview  Goal: Plan of Care Review  Outcome: Ongoing (interventions implemented as appropriate)   03/21/19 2466   Coping/Psychosocial   Plan of Care Reviewed With patient   Plan of Care Review   Progress improving   OTHER   Outcome Summary Patient complained of pain one time this shift which was sharp shooting pain in left side. MD and APRN at bedside and drain was pulled. Pain almost instantly went away and pain pill was administered. No further complaints of pain since drain was pulled. Wires taped and isolated. Patient has been agitated with staff and is anxious. Patient is easily reoriented, just forgetful. Patient consumed all of supplements today but didn't eat has good today as yesterday. Patient has walked four times this shift with one assist.        Problem: Fall Risk (Adult)  Goal: Absence of Fall  Outcome: Ongoing (interventions implemented as appropriate)      Problem: Skin Injury Risk (Adult)  Goal: Identify Related Risk Factors and Signs and Symptoms  Outcome: Outcome(s) achieved Date Met: 03/21/19    Goal: Skin Health and Integrity  Outcome: Ongoing (interventions implemented as appropriate)      Problem: Cardiac Surgery (Adult)  Goal: Signs and Symptoms of Listed Potential Problems Will be Absent, Minimized or Managed (Cardiac Surgery)  Outcome: Ongoing (interventions implemented as appropriate)

## 2019-03-21 NOTE — PROGRESS NOTES
Continued Stay Note  [facility]     Patient Name: Gerson Wilhelm  MRN: [id number]  Today's Date: 3/21/2019    Admit Date: 3/11/2019    Discharge Plan     Row Name 03/21/19 1413       Plan    Plan  SW spoke to pt and spouse about hh vs swing bed.  They stated they do want to go to swing bed.  Possible dc tomorrow.  MARION Gregory.      Patient/Family in Agreement with Plan  yes    Row Name 03/21/19 1121       Plan    Plan  Dr. Patel is accepting MD at [facility] (#[phone]).  MARION Gregory.     Patient/Family in Agreement with Plan  yes        Discharge Codes    No documentation.             MARION Andre  Continued Stay Note  [facility]     Patient Name: Gerson Wilhelm  MRN: [id number]  Today's Date: 3/21/2019    Admit Date: 3/11/2019    Discharge Plan     Row Name 03/21/19 1413       Plan    Plan  SW spoke to pt and spouse about hh vs swing bed.  They stated they do want to go to swing bed.  Possible dc tomorrow.  MARION Gregory.      Patient/Family in Agreement with Plan  yes    Row Name 03/21/19 1121       Plan    Plan  Dr. Patel is accepting MD at [facility] (#[phone]).  MARION Gregory.     Patient/Family in Agreement with Plan  yes        Discharge Codes    No documentation.             MARION Andre

## 2019-03-21 NOTE — PLAN OF CARE
Problem: Patient Care Overview  Goal: Plan of Care Review  Outcome: Ongoing (interventions implemented as appropriate)   03/20/19 8404   Coping/Psychosocial   Plan of Care Reviewed With patient   Plan of Care Review   Progress improving   OTHER   Outcome Summary Patient had complaints of sharp stabbing pain in left side around drain site. Relieved with prn pain medicine. Patient is alert and oriented x4 but forgetful. Patient forgets that he needs assistance to get up and will use arms to get out of chair. Nursing staff along with physical therapy staff has educated how to get up safely throughout the day but patient cant remember. When wife walks out of the room patient gets up unassisted setting off chair alarm. Patient has walked three times so far this shift with one assist which is an improvement from yesterday. Appetite much better starting at lunch. NSR on tele 02-98.

## 2019-03-21 NOTE — PLAN OF CARE
Problem: Patient Care Overview  Goal: Plan of Care Review   03/21/19 0309   OTHER   Outcome Summary VSS. Patient with ongoing complaint of left flank pain, near GERSON site. PRN pain meds with little relief early in shift, better relief this morning. Continues to get up without assist despite chair alarm, when reminded says he understands but gets up alone a little while later. chair alarm is in place. Offered snacks but states he is not hungry. Will continue to monitor.       Problem: Skin Injury Risk (Adult)  Goal: Identify Related Risk Factors and Signs and Symptoms  Outcome: Ongoing (interventions implemented as appropriate)   03/21/19 0309   Skin Injury Risk (Adult)   Related Risk Factors (Skin Injury Risk) mobility impaired     Goal: Skin Health and Integrity  Outcome: Ongoing (interventions implemented as appropriate)   03/21/19 0309   Skin Injury Risk (Adult)   Skin Health and Integrity making progress toward outcome       Problem: Cardiac Surgery (Adult)  Goal: Signs and Symptoms of Listed Potential Problems Will be Absent, Minimized or Managed (Cardiac Surgery)  Outcome: Ongoing (interventions implemented as appropriate)   03/21/19 0309   Goal/Outcome Evaluation   Problems Assessed (Cardiac Surgery) bowel motility decreased;situational response   Problems Present (Cardiac Surgery) bowel motility decreased;situational response

## 2019-03-22 PROBLEM — R53.81 PHYSICAL DEBILITY: Status: ACTIVE | Noted: 2019-03-22

## 2019-03-22 LAB
GLUCOSE BLDC GLUCOMTR-MCNC: 112 MG/DL (ref 70–130)
GLUCOSE BLDC GLUCOMTR-MCNC: 154 MG/DL (ref 70–130)
GLUCOSE BLDC GLUCOMTR-MCNC: 191 MG/DL (ref 70–130)

## 2019-03-22 PROCEDURE — 94760 N-INVAS EAR/PLS OXIMETRY 1: CPT

## 2019-03-22 PROCEDURE — 94799 UNLISTED PULMONARY SVC/PX: CPT

## 2019-03-22 PROCEDURE — 25010000002 ENOXAPARIN PER 10 MG: Performed by: NURSE PRACTITIONER

## 2019-03-22 PROCEDURE — 63710000001 INSULIN LISPRO (HUMAN) PER 5 UNITS: Performed by: THORACIC SURGERY (CARDIOTHORACIC VASCULAR SURGERY)

## 2019-03-22 PROCEDURE — 82962 GLUCOSE BLOOD TEST: CPT

## 2019-03-22 PROCEDURE — 97116 GAIT TRAINING THERAPY: CPT

## 2019-03-22 PROCEDURE — 99024 POSTOP FOLLOW-UP VISIT: CPT | Performed by: NURSE PRACTITIONER

## 2019-03-22 RX ORDER — CLOPIDOGREL BISULFATE 75 MG/1
75 TABLET ORAL DAILY
Qty: 30 TABLET | Refills: 2 | Status: SHIPPED | OUTPATIENT
Start: 2019-03-22

## 2019-03-22 RX ORDER — ASPIRIN 81 MG/1
81 TABLET ORAL DAILY
Qty: 30 TABLET | Refills: 2 | Status: SHIPPED | OUTPATIENT
Start: 2019-03-22

## 2019-03-22 RX ORDER — AMIODARONE HYDROCHLORIDE 400 MG/1
400 TABLET ORAL DAILY
Qty: 30 TABLET | Refills: 0 | Status: SHIPPED | OUTPATIENT
Start: 2019-03-22 | End: 2019-09-30 | Stop reason: HOSPADM

## 2019-03-22 RX ORDER — METOPROLOL TARTRATE 50 MG/1
50 TABLET, FILM COATED ORAL EVERY 12 HOURS SCHEDULED
Qty: 60 TABLET | Refills: 2 | Status: SHIPPED | OUTPATIENT
Start: 2019-03-22

## 2019-03-22 RX ORDER — ATORVASTATIN CALCIUM 20 MG/1
20 TABLET, FILM COATED ORAL NIGHTLY
Qty: 30 TABLET | Refills: 2 | Status: SHIPPED | OUTPATIENT
Start: 2019-03-22

## 2019-03-22 RX ADMIN — ENOXAPARIN SODIUM 30 MG: 30 INJECTION SUBCUTANEOUS at 08:46

## 2019-03-22 RX ADMIN — OXYCODONE AND ACETAMINOPHEN 1 TABLET: 5; 325 TABLET ORAL at 00:01

## 2019-03-22 RX ADMIN — GLIPIZIDE 2.5 MG: 5 TABLET ORAL at 08:46

## 2019-03-22 RX ADMIN — INSULIN LISPRO 2 UNITS: 100 INJECTION, SOLUTION INTRAVENOUS; SUBCUTANEOUS at 08:46

## 2019-03-22 RX ADMIN — CLOPIDOGREL 75 MG: 75 TABLET, FILM COATED ORAL at 17:17

## 2019-03-22 RX ADMIN — POTASSIUM CHLORIDE 20 MEQ: 750 CAPSULE, EXTENDED RELEASE ORAL at 17:16

## 2019-03-22 RX ADMIN — IPRATROPIUM BROMIDE AND ALBUTEROL SULFATE 3 ML: 2.5; .5 SOLUTION RESPIRATORY (INHALATION) at 07:28

## 2019-03-22 RX ADMIN — GLIPIZIDE 2.5 MG: 5 TABLET ORAL at 17:17

## 2019-03-22 RX ADMIN — LIDOCAINE 2 PATCH: 50 PATCH CUTANEOUS at 08:47

## 2019-03-22 RX ADMIN — RAMIPRIL 5 MG: 5 CAPSULE ORAL at 08:46

## 2019-03-22 RX ADMIN — AMIODARONE HYDROCHLORIDE 400 MG: 200 TABLET ORAL at 08:46

## 2019-03-22 RX ADMIN — METOPROLOL TARTRATE 50 MG: 50 TABLET ORAL at 08:46

## 2019-03-22 RX ADMIN — ASPIRIN 81 MG: 81 TABLET, DELAYED RELEASE ORAL at 08:46

## 2019-03-22 RX ADMIN — PANTOPRAZOLE SODIUM 40 MG: 40 TABLET, DELAYED RELEASE ORAL at 05:20

## 2019-03-22 RX ADMIN — POTASSIUM CHLORIDE 20 MEQ: 750 CAPSULE, EXTENDED RELEASE ORAL at 08:46

## 2019-03-22 RX ADMIN — INSULIN LISPRO 2 UNITS: 100 INJECTION, SOLUTION INTRAVENOUS; SUBCUTANEOUS at 11:54

## 2019-03-22 RX ADMIN — METFORMIN HYDROCHLORIDE 1000 MG: 500 TABLET ORAL at 17:17

## 2019-03-22 RX ADMIN — METFORMIN HYDROCHLORIDE 1000 MG: 500 TABLET ORAL at 08:46

## 2019-03-22 NOTE — PLAN OF CARE
Problem: Patient Care Overview  Goal: Plan of Care Review  Outcome: Ongoing (interventions implemented as appropriate)   03/22/19 1830   Coping/Psychosocial   Plan of Care Reviewed With patient   OTHER   Outcome Summary Pt. required CGA for sit <>stand transfers and to amb 228'. He experience 1 episode of his knees buckling while in the paul but was able to catch himself. His gait appeared very unsteady, w/out correction he drags his feet but does better after instruction. Pt. performed cardiac protocol x20. Pt. will benefit from cont amb and gait training.

## 2019-03-22 NOTE — PROGRESS NOTES
Continued Stay Note  [facility]     Patient Name: Gerson Wilhelm  MRN: [id number]  Today's Date: 3/22/2019    Admit Date: 3/11/2019    Discharge Plan     Row Name 03/22/19 1413       Plan    Final Discharge Disposition Code  61 - hospital-based swing bed    Final Note  Pt dc today to [facility].  Spouse taking by private car.  Phone:  [phone] Fax:  [phone].  DEVYN spoke to Adele in admissions.  Their pharmacy closes at 5:00PM so they need scripts ASAP.  DEVYN has informed charge nurse.  MARION Gregory.         Discharge Codes    No documentation.             MARION Andre

## 2019-03-22 NOTE — PROGRESS NOTES
Continued Stay Note  [facility]     Patient Name: Gerson Wilhelm  MRN: [id number]  Today's Date: 3/22/2019    Admit Date: 3/11/2019    Discharge Plan     Row Name 03/22/19 1533       Plan    Patient/Family in Agreement with Plan  yes    Final Note  If any issues at dc, admissions Adele can be contacted at [phone] (cell).  MARION Gregory.    Row Name 03/22/19 1413       Plan    Final Discharge Disposition Code  61 - hospital-based swing bed    Final Note  Pt dc today to [facility].  Spouse taking by private car.  Phone:  [phone] Fax:  [phone].  DEVYN spoke to Adele in admissions.  Their pharmacy closes at 5:00PM so they need scripts ASAP.  DEVYN has informed charge nurse.  MARION Gregory.         Discharge Codes    No documentation.             MARION Andre

## 2019-03-22 NOTE — THERAPY TREATMENT NOTE
Acute Care - Physical Therapy Treatment Note  [facility]     Patient Name: Gerson Wilhelm  : 1945  MRN: [id number]  Today's Date: 3/22/2019  Onset of Illness/Injury or Date of Surgery: 19  Date of Referral to PT: 19  Referring Physician: Dr Orona    Admit Date: 3/11/2019    Visit Dx:    ICD-10-CM ICD-9-CM   1. ENNIS (dyspnea on exertion) R06.09 786.09   2. Impaired mobility Z74.09 799.89   3. Coronary artery disease involving native coronary artery of native heart without angina pectoris I25.10 414.01     Patient Active Problem List   Diagnosis   • ENNIS (dyspnea on exertion)   • Coronary artery disease involving native coronary artery of native heart without angina pectoris   • History of stroke   • Type 2 diabetes mellitus with circulatory disorder, with long-term current use of insulin (CMS/HCC)   • Essential hypertension   • Smokeless tobacco use   • Paroxysmal atrial fibrillation (CMS/HCC)   • Chronic systolic heart failure (CMS/HCC)   • Restrictive lung disease   • Hiatal hernia   • Obesity (BMI 30-39.9)       Therapy Treatment    Rehabilitation Treatment Summary     Row Name 19 0757             Treatment Time/Intention    Discipline  physical therapy assistant  -AF      Document Type  therapy note (daily note)  -AF      Subjective Information  no complaints  -AF2      Existing Precautions/Restrictions  fall;sternal  -AF      Recorded by [AF] Isis Adler, PTA 19 0758  [AF2] Isis Adler, PTA 19 0823      Row Name 19 0757             Sit-Stand Transfer    Sit-Stand [place] (Transfers)  contact guard;verbal cues  -AF      Recorded by [AF] Isis Adler, PTA 19 0823      Row Name 19 0757             Stand-Sit Transfer    Stand-Sit [place] (Transfers)  contact guard  -AF      Recorded by [AF] Isis Adler, PTA 19 0823      Row Name 19 0757             Gait/Stairs Assessment/Training    [place] Level (Gait)   contact guard  -AF      Distance in Feet (Gait)  228 1 standing rest  -AF      Deviations/Abnormal Patterns (Gait)  gait speed decreased;iban decreased  -AF      Comment (Gait/Stairs)  1 episode of knees buckling  -AF      Recorded by [AF] Isis Adler, PTA 03/22/19 0823      Row Name 03/22/19 0757             Therapeutic Exercise    Comment (Therapeutic Exercise)  cardiac procotol x 20  -AF      Recorded by [AF] Isis Adler, WILLIAM 03/22/19 0823      Row Name 03/22/19 0757             Positioning and Restraints    Pre-Treatment Position  sitting in chair/recliner  -AF      Post Treatment Position  chair  -AF      In Chair  exit alarm on;call light within reach;with family/caregiver;encouraged to call for assist  -AF      Recorded by [AF] Isis Adler, PTA 03/22/19 0823      Row Name 03/22/19 0757             Pain Scale: Numbers Pre/Post-Treatment    Pain Scale: Numbers, Pretreatment  0/10 - no pain  -AF      Pain Scale: Numbers, Post-Treatment  0/10 - no pain  -AF      Recorded by [AF] Isis Adler, PTA 03/22/19 0823      Row Name                Wound 03/14/19 0856 Other (See comments) chest incision    Wound - Properties Group Date first assessed: 03/14/19 [DS] Time first assessed: 0856 [DS] Side: Other (See comments) [DS] Location: chest [DS] Type: incision [DS] Recorded by:  [DS] Bert Isaac RN 03/14/19 0856    Row Name                Wound 03/14/19 0856 Left leg incision    Wound - Properties Group Date first assessed: 03/14/19 [DS] Time first assessed: 0856 [DS] Side: Left [DS] Location: leg [DS] Type: incision [DS] Recorded by:  [DS] Bert Isaac RN 03/14/19 0856      User Key  (r) = Recorded By, (t) = Taken By, (c) = Cosigned By    Initials Name Effective Dates Discipline    DS Bert Isaac RN 08/02/16 -  Nurse    AF Isis Adler, PTA 02/11/19 -  PT          Wound 03/14/19 0856 Other (See comments) chest incision (Active)   Dressing Appearance open to air 3/22/2019   7:35 AM   Closure Open to air;Liquid skin adhesive;Approximated 3/22/2019  7:35 AM   Base clean;dry 3/22/2019  7:35 AM   Periwound intact;dry 3/22/2019  7:35 AM   Periwound Temperature warm 3/21/2019  8:25 PM   Periwound Skin Turgor soft 3/21/2019  8:25 PM   Drainage Amount none 3/22/2019  7:35 AM   Care, Wound cleansed with;soap and water 3/22/2019  7:35 AM   Dressing Care, Wound open to air 3/22/2019  7:35 AM   Periwound Care, Wound dry periwound area maintained 3/21/2019  8:25 PM       Wound 03/14/19 0856 Left leg incision (Active)   Dressing Appearance open to air 3/22/2019  7:35 AM   Closure Open to air;Liquid skin adhesive;Approximated 3/22/2019  7:35 AM   Base clean;dry 3/22/2019  7:35 AM   Periwound intact;dry 3/22/2019  7:35 AM   Periwound Temperature warm 3/21/2019  8:25 PM   Periwound Skin Turgor soft 3/21/2019  8:25 PM   Drainage Amount none 3/22/2019  7:35 AM   Care, Wound cleansed with;soap and water 3/22/2019  7:35 AM   Dressing Care, Wound open to air 3/22/2019  7:35 AM   Periwound Care, Wound dry periwound area maintained 3/21/2019  8:25 PM       Wound midline;lower chest puncture (Active)   Dressing Appearance dry;intact 3/22/2019  7:35 AM   Closure RICHIE 3/22/2019  7:35 AM   Base dressing in place, unable to visualize 3/22/2019  7:35 AM   Periwound intact;dry 3/21/2019  8:25 PM   Periwound Temperature warm 3/21/2019  8:25 PM   Periwound Skin Turgor soft 3/21/2019  8:25 PM   Drainage Amount none 3/22/2019  7:35 AM   Care, Wound cleansed with;soap and water 3/22/2019  7:35 AM   Dressing Care, Wound gauze 3/22/2019  7:35 AM   Periwound Care, Wound dry periwound area maintained 3/21/2019  8:25 PM           Physical Therapy Education     Title: PT OT SLP Therapies (In Progress)     Topic: Physical Therapy (In Progress)     Point: Mobility training (In Progress)     Learning Progress Summary           Patient Acceptance, E, NR by DARREN at 3/15/2019  8:15 AM    Comment:  Educated pt. on progression of PT POC,  benefits of activity, sternal precautions.    Acceptance, E, VU,DU,NR by DARREN at 3/13/2019  2:30 PM    Comment:  Educated pt/spouse on progression of PT POC and benefits of activity. Answered pre questions about open heart surgery being performed tomorrow and educated on sternal precautions.   Significant Other Acceptance, E, VU,DU,NR by DARREN at 3/13/2019  2:30 PM    Comment:  Educated pt/spouse on progression of PT POC and benefits of activity. Answered pre questions about open heart surgery being performed tomorrow and educated on sternal precautions.                   Point: Precautions (In Progress)     Learning Progress Summary           Patient Acceptance, E, NR by DARREN at 3/15/2019  8:15 AM    Comment:  Educated pt. on progression of PT POC, benefits of activity, sternal precautions.                               User Key     Initials Effective Dates Name Provider Type Discipline    DARREN 08/02/16 -  Dinh Melgar, PT DPT Physical Therapist PT                PT Recommendation and Plan     Plan of Care Reviewed With: patient  Outcome Summary: Pt. required CGA for sit <>stand transfers and to amb 228'. He experience 1 episode of his knees buckling while in the paul but was able to catch himself. His gait appeared very unsteady, w/out correction he drags his feet but does better after instruction. Pt. performed cardiac protocol x20. Pt. will benefit from cont amb and gait training.  Outcome Measures     Row Name 03/22/19 0757             How much help from another person do you currently need...    Turning from your back to your side while in flat bed without using bedrails?  2  -AF      Moving from lying on back to sitting on the side of a flat bed without bedrails?  2  -AF      Moving to and from a bed to a chair (including a wheelchair)?  2  -AF      Standing up from a chair using your arms (e.g., wheelchair, bedside chair)?  3  -AF      Climbing 3-5 steps with a railing?  1  -AF      To walk in hospital room?  3   -AF      AM-PAC 6 Clicks Score  13  -AF         Functional Assessment    Outcome Measure Options  AM-PAC 6 Clicks Basic Mobility (PT)  -AF        User Key  (r) = Recorded By, (t) = Taken By, (c) = Cosigned By    Initials Name Provider Type    AF [place], Isis, PTA Physical Therapy Assistant         Time Calculation:   PT Charges     Row Name 03/22/19 0826             Time Calculation    Start Time  0757  -AF      Stop Time  0820  -AF      Time Calculation (min)  23 min  -AF      PT Received On  03/22/19  -AF      PT Goal Re-Cert Due Date  03/25/19  -AF         Timed Charges    94540 - Gait Training Minutes   23  -AF        User Key  (r) = Recorded By, (t) = Taken By, (c) = Cosigned By    Initials Name Provider Type    AF [place], Isis, PTA Physical Therapy Assistant        Therapy Charges for Today     Code Description Service Date Service Provider Modifiers Qty    [id number] HC GAIT TRAINING EA 15 MIN 3/22/2019 [place], Isis, PTA GP 2          PT G-Codes  Outcome Measure Options: AM-PAC 6 Clicks Basic Mobility (PT)  AM-PAC 6 Clicks Score: 13    Isis [place], PTA  3/22/2019

## 2019-03-22 NOTE — DISCHARGE SUMMARY
[facility] Cardiothoracic Surgery  DISCHARGE SUMMARY        Date of Admission: 3/11/2019  Date of Discharge:  3/22/2019  Primary Care Physician: Joshua Griggs MD    Discharge Diagnoses:  Active Hospital Problems    Diagnosis   • **Coronary artery disease involving native coronary artery of native heart without angina pectoris   • Physical debility   • Paroxysmal atrial fibrillation (CMS/HCC)   • Chronic systolic heart failure (CMS/HCC)   • Restrictive lung disease   • Hiatal hernia   • Obesity (BMI 30-39.9)   • History of stroke   • Type 2 diabetes mellitus with circulatory disorder, with long-term current use of insulin (CMS/HCC)   • Essential hypertension   • Smokeless tobacco use   • ENNIS (dyspnea on exertion)     Added automatically from request for surgery [id number]       Procedures Performed: Cardiac catheterization performed on 3/11/2019 by Dr. Obrien; Coronary artery bypass grafting-2 vessel (left internal mammary artery/left anterior descending and reverse saphenous vein graft/diagonal artery) performed on 3/14/2019 by Dr. Orona.     HPI:  Mr. Gerson Wilhelm is a 73 y.o. male  with pertinent past medical history of congestive heart failure, diabetes mellitus, GERD, known hiatal hernia, past tobacco use and history of stroke who presents with increasing shortness of breath.  This has been present for the last few months.  It has increased in severity.  No chest pain.  No lower extremity edema.  He was recently admitted for pneumonia and treated as such late last year.  He did have further evaluation with him reporting that he was told his ct scan of the chest demonstrated heart failure.  A referral was made to Dr. Obrien. LHC was performed finding significant LAD distribution CAD not amenable to PCI. His CVA was attributed to his diabetes.  Separately, he and his wife report concern that he may not be a candidate for surgery as he was declined surgery to fix his hiatal hernia.  Cardiothoracic surgery was consulted for consideration of CABG. His workup has demonstrated substantial restrictive lung disease as well as depressed left ventricular function.  In the setting of previous CVA with some residual he will carry increase risk for both morbidity and mortality. Ambulation was assessed as he reported decreased mobility secondary to CVA and rehabilitation post discharge was discussed. He was evaluated by Dr. Loera of the pulmonary service and it is thought that he will be at increased risk for pulmonary complications but is a reasonable candidate from a pulmonary point of view.  That being said he remains a suitable operative candidate.  All questions been answered and he is agreeable to proceed forward.    Hospital Course: Mr. Wilhelm was taken to the operative suite on March 14, 2019 for CABG. Please see a separate op note by Dr. Orona detailing the operation. Following surgery, he was transferred to ICU in stable and guarded condition. He remained hemodynamically stable overnight and was extubated overnight. On post op day 1, he demonstrated a neurological intact exam. He was up to the chair and started working with PT. Jamin synephrine gtt was weaned off and he remained with stable vital signs. He was kept in the ICU for close watch and required much prompting for pulmonary toilet and ambulation with modetate-maximum assistance. He was diuresed and supplemental oxygen therapy was weaned off.  Pain medications were adjusted due to drowsiness.  He did have an episode of paroxysmal atrial fibrillation and was treated with an amiodarone drip.  He converted to normal sinus rhythm and remained in normal sinus rhythm during the duration of his hospital stay.  Mediastinal tubes were removed without remark.  On postop day 5, he was transferred to  for continued recovery.  The remaining stay of his hospitalization was remarkable for working with physical therapy to increase strength and  endurance.  Multidisciplinary care team recommends patient is not safe for discharge to home as he has violated sternotomy precautions multiple times and has poor insight as to fall risk.  Decision-making was made to pursue focused rehabilitation given physical debility and preoperative CVA deficits.  Facilities that are well versed with [place] post cardiac surgery recovery protocol were offered.  However,  and Ms. Wilhelm desire discharge to rehabilitation at [facility] due to close proximity to home.  Patient has been accepted by Dr. Patel and care has been discussed. Josias drain and pacing wires were removed without remark.  He meets criteria for discharge on postop day 8 and is agreeable to do so.    Condition on Discharge:  Neurologically baseline exam and has good pain control.  He is eating and taking Glucerna supplement shakes TID and has demonstrable good bowel function.  The sternum is stable without clicks and the saphenectomy incisions are healing nicely.  The heart is in  normal sinus rhythm.  He has worked with physical therapy and is contact-guard assist for sit to stand transfers and ambulating up to 200 feet.  His gait is unsteady and he does violate sternotomy precautions.  Short-term rehab has been recommended due to impaired balance, strength, and decreased safety choices.  He verbalizes receiving of a separately handed out cardiac surgery handout and needs reminders to sternal precautions.    Discharge Disposition: [facility]    Discharge Medications:     Discharge Medications      New Medications      Instructions Start Date   amiodarone 400 MG tablet  Commonly known as:  PACERONE   400 mg, Oral, Daily      clopidogrel 75 MG tablet  Commonly known as:  PLAVIX   75 mg, Oral, Daily      metoprolol tartrate 50 MG tablet  Commonly known as:  LOPRESSOR   50 mg, Oral, Every 12 Hours Scheduled         Changes to Medications      Instructions Start Date   aspirin  81 MG tablet  What changed:    · medication strength  · how much to take   81 mg, Oral, Daily      atorvastatin 20 MG tablet  Commonly known as:  LIPITOR  What changed:    · medication strength  · how much to take   20 mg, Oral, Nightly         Continue These Medications      Instructions Start Date   escitalopram 20 MG tablet  Commonly known as:  LEXAPRO   20 mg, Oral, Daily      ferrous sulfate 325 (65 FE) MG tablet   325 mg, Oral, 2 Times Daily      furosemide 40 MG tablet  Commonly known as:  LASIX   40 mg, Oral, Daily      glipiZIDE 10 MG tablet  Commonly known as:  GLUCOTROL   10 mg, Oral, Daily      insulin glargine 100 UNIT/ML injection  Commonly known as:  LANTUS   34 Units, Subcutaneous, Nightly      lansoprazole 30 MG capsule  Commonly known as:  PREVACID   30 mg, Oral, 2 Times Daily      magnesium oxide 400 (241.3 Mg) MG tablet tablet  Commonly known as:  MAGOX   400 mg, Oral, Once      metFORMIN 1000 MG tablet  Commonly known as:  GLUCOPHAGE   1,000 mg, Oral, 2 Times Daily      ramipril 10 MG capsule  Commonly known as:  ALTACE   10 mg, Oral, Daily      rOPINIRole 4 MG tablet  Commonly known as:  REQUIP   4 mg, Oral, Nightly         Stop These Medications    carvedilol 3.125 MG tablet  Commonly known as:  COREG          Discharge Diet: No concentrated sweets diet with an effort to maintain acceptable glycemic control.  Gilma denton TID.     Discharge Care Plan / Instructions: Please see the separately handed out cardiac surgery handout.    Activity at Discharge:   No heavy lifting greater than 5 pounds or a 1/2 gallon of milk while maintaining sternal precautions.  Gerson Wilhelm has been instructed on an exercise  regiment as detailed in a handed out cardiac surgery handout.    Tobacco:  The patient does not use tobacco products and therefore does not need tobacco cessation education.    BMI: Patient's Body mass index is 31.21 kg/m². BMI is above normal parameters. Recommendations include:  educational material, referral to primary care and establishing durable lifestyle choices to accomplish an acceptable weight for age and height.    Follow-up Appointments: Speer LONNIE Wilhelm  is requested to see Joshua Griggs MD within 1-2 weeks from time of discharge, to see Zeynep DE LEON in 1 week with echo, to follow-up with Dr. Orona in 4 weeks,  and to follow-up with Dr. Obrien of the cardiology service in 6 weeks.

## 2019-03-22 NOTE — PLAN OF CARE
Problem: Patient Care Overview  Goal: Plan of Care Review  Outcome: Ongoing (interventions implemented as appropriate)   03/22/19 3625   Coping/Psychosocial   Plan of Care Reviewed With patient;spouse   Plan of Care Review   Progress improving   OTHER   Outcome Summary Pt slept throughout shift. Minimal c/o pain. No falls. VSS. Possible discharge to [facility]. swing bed today. SR on tele.       Problem: Fall Risk (Adult)  Goal: Absence of Fall  Outcome: Ongoing (interventions implemented as appropriate)      Problem: Skin Injury Risk (Adult)  Goal: Skin Health and Integrity  Outcome: Ongoing (interventions implemented as appropriate)      Problem: Cardiac Surgery (Adult)  Goal: Signs and Symptoms of Listed Potential Problems Will be Absent, Minimized or Managed (Cardiac Surgery)  Outcome: Ongoing (interventions implemented as appropriate)

## 2019-03-22 NOTE — PROGRESS NOTES
"CABG x 2, Right leg open vein harvest, placement of right femoral arterial line    POD 8    Up in the chair. On room air. Ready to DC today to [facility] swing bed.  Visit Vitals  /67 (BP Location: Left arm, Patient Position: Sitting)   Pulse 72   Temp 98 °F (36.7 °C) (Oral)   Resp 18   Ht 172.7 cm (67.99\")   Wt 93.1 kg (205 lb 3.2 oz)   SpO2 97%   BMI 31.21 kg/m²   Baseline weight: 194  RA    Intake/Output Summary (Last 24 hours) at 3/22/2019 1610  Last data filed at 3/22/2019 1320  Gross per 24 hour   Intake 940 ml   Output 700 ml   Net 240 ml     Physical Exam:  General: No apparent distress. Up in the chair.  Cardiovascular: Regular rate and rhythm without murmur, rubs, or gallops.    Pulmonary: Clear to auscultation bilaterally without wheezing, rubs, or rales.  Chest: Sternotomy incision clean, dry, and intact. Sternum stable. No clicks.   Abdomen: Soft, non-distended, and non-tender.  Extremities: Warm, moves all extremities. Saphenectomy site clean, dry, and intact. Lower extremity edema-improving.   Neurologic: Grossly intact with no focal deficits.    Impression:  Coronary artery disease status post CABG   Left ventricular dysfuction  Type 2 diabetes mellitus  Hypertension  History of stroke  Restrictive lung disease  Hiatal hernia, large  Physical debility  Paroxysmal atrial fibrillation, currently NSR    Plan:  Pacing wires discontinued   Continue to encourage oral intake  Continue to push pulmonary toilet and ambulation  Continue routine post cardiac surgery regimen  Dr. Orona has spoke with Dr. Joshua Griggs and he has agreed to accept patient to [facility] swing bed. DC to [facility] swing bed today.   "

## 2019-03-23 NOTE — THERAPY DISCHARGE NOTE
Acute Care - Physical Therapy Discharge Summary  [facility]       Patient Name: Gerson Wilhelm  : 1945  MRN: [id number]    Today's Date: 3/23/2019  Onset of Illness/Injury or Date of Surgery: 19    Date of Referral to PT: 19  Referring Physician: Dr Orona      Admit Date: 3/11/2019      PT Recommendation and Plan    Visit Dx:    ICD-10-CM ICD-9-CM   1. ENNIS (dyspnea on exertion) R06.09 786.09   2. Impaired mobility Z74.09 799.89   3. Coronary artery disease involving native coronary artery of native heart without angina pectoris I25.10 414.01       Outcome Measures     Row Name 19 0757             How much help from another person do you currently need...    Turning from your back to your side while in flat bed without using bedrails?  2  -AF      Moving from lying on back to sitting on the side of a flat bed without bedrails?  2  -AF      Moving to and from a bed to a chair (including a wheelchair)?  2  -AF      Standing up from a chair using your arms (e.g., wheelchair, bedside chair)?  3  -AF      Climbing 3-5 steps with a railing?  1  -AF      To walk in hospital room?  3  -AF      AM-PAC 6 Clicks Score  13  -AF         Functional Assessment    Outcome Measure Options  AM-PAC 6 Clicks Basic Mobility (PT)  -AF        User Key  (r) = Recorded By, (t) = Taken By, (c) = Cosigned By    Initials Name Provider Type    AF [place], Isis, PTA Physical Therapy Assistant                      PT Discharge Summary  Anticipated Discharge Disposition (PT): transitional care  Reason for Discharge: Discharge from facility  Outcomes Achieved: Refer to plan of care for updates on goals achieved  Discharge Destination: TCU, other (comment)(Swing Bed)      Lm Hernandez PTA   3/23/2019

## 2019-04-01 VITALS
BODY MASS INDEX: 31.1 KG/M2 | HEIGHT: 68 IN | OXYGEN SATURATION: 95 % | WEIGHT: 205.2 LBS | RESPIRATION RATE: 18 BRPM | DIASTOLIC BLOOD PRESSURE: 78 MMHG | HEART RATE: 80 BPM | SYSTOLIC BLOOD PRESSURE: 133 MMHG | TEMPERATURE: 98 F

## 2019-04-09 NOTE — PROGRESS NOTES
Subjective   Chief Complaint   Patient presents with   • Post-op Follow-up     CABG x2 on 3/14/19       Patient ID: Gerson Wilhelm is a 73 y.o. male who is here for follow-up having had Coronary artery bypass grafting-2 vessel (left internal mammary artery/left anterior descending and reverse saphenous vein graft/diagonal artery) performed on 3/14/2019 by Dr. Orona.    History of Present Illness  Post operative recovery was remarkable for physical debilitation and discharge to rehabilitation at [facility]. He returns today for first follow up visit. He has completed rehab at [facility] and has been discharged this morning. Reports he is doing well. Using rolling walker at home for balance only. Did have 1 fall while at rehab. This was witnessed. Wife reports he was coming out of bathroom and due to unsteady gait fell on his buttocks. Denies chest trauma.  Sleep habits are fair. Pain control has been great. No fevers/sweats/chills. No drainage from incisions.  No sternal clicks. No chest pain or shortness of breath. Appetite is poor and is improving. Glycemic control is good, reports BG under 200. Has been drinking milkshakes with low sugar to help gain weight after significant weight loss of 24 pounds since hospital discharge. Denies tobacco and smokeless tobacco products.  Has follow up with Dr. Obrien in [place], [place] on 5/2.     The following portions of the patient's history were reviewed and updated as appropriate: allergies, current medications, past family history, past medical history, past social history, past surgical history and problem list.    Review of Systems   Constitutional: Positive for fatigue. Negative for chills, diaphoresis and fever.   Respiratory: Negative for cough, shortness of breath and wheezing.    Cardiovascular: Negative for chest pain, palpitations and leg swelling.   Musculoskeletal: Positive for gait problem.       Objective   Visit Vitals  /70 (BP Location:  "Right arm, Patient Position: Sitting, Cuff Size: Adult)   Pulse 66   Ht 172.7 cm (67.99\")   Wt 82.4 kg (181 lb 9.6 oz)   SpO2 98%   BMI 27.62 kg/m²       Physical Exam   Constitutional: He is oriented to person, place, and time. He appears well-developed and well-nourished. No distress.   HENT:   Head: Normocephalic and atraumatic.   Eyes: Pupils are equal, round, and reactive to light.   Neck: Normal range of motion. Neck supple.   Cardiovascular: Normal rate, regular rhythm and normal heart sounds. Exam reveals no friction rub.   No murmur heard.  Pulmonary/Chest: Effort normal and breath sounds normal. No respiratory distress. He has no wheezes. He has no rales.   Abdominal: Soft. He exhibits no distension. There is no tenderness. There is no guarding.   Musculoskeletal: He exhibits no edema.   Neurological: He is alert and oriented to person, place, and time.   Skin: Skin is warm and dry. No rash noted. He is not diaphoretic. No pallor.   Sternotomy site clean, dry, and intact. Sternum stable. No clicks.  Saphenectomy site clean, dry, and intact.    Psychiatric: He has a normal mood and affect. His behavior is normal.   Vitals reviewed.      Assessment/Plan       Gerson was seen today for post-op follow-up.    Diagnoses and all orders for this visit:    Coronary artery disease involving native coronary artery of native heart without angina pectoris  -     Adult Transthoracic Echo Limited W/ Cont if Necessary Per Protocol; Future    Type 2 diabetes mellitus with other circulatory complication, with long-term current use of insulin (CMS/[facility])    Chronic systolic heart failure (CMS/HCC)  -     Adult Transthoracic Echo Limited W/ Cont if Necessary Per Protocol; Future    Physical debility    History of stroke         Overall, Gerson Wilhelm is doing well.  We discussed current sternotomy precautions and how these will not be advanced yet. Discussed strict adherence to sternal precautions when transferring " positions and while using walker.  Following post op cardiac surgery home instructions. Discussed oral intake and supplementing with glucerna. Will check limited echo prior to next office visit with Dr. Orona as intraoperative echo showed EF 55% while on inotropic drips. Wife prefers this test to be done on April 24 or 25 due to her work schedule. We will try to schedule during this timeframe for her. Provided support and encouragement. All questions have been answered to the best of my ability. Will discuss starting cardiac rehabilitation at official 1 month post op visit. Patient has follow up with Dr. Orona in a few weeks. Patient has follow up with Cardiology in a few weeks. Patient has been instructed to contact our office with any questions or concerns should they arise prior to the next office visit.     Patient's Body mass index is 27.62 kg/m². BMI is within normal parameters. No follow-up required.    Gerson Wilhelm is a non-smoker and therefore does not need tobacco cessation education/counseling. He is a former smokeless tobacco user.

## 2019-04-10 ENCOUNTER — OFFICE VISIT (OUTPATIENT)
Dept: CARDIAC SURGERY | Facility: CLINIC | Age: 74
End: 2019-04-10

## 2019-04-10 VITALS
OXYGEN SATURATION: 98 % | HEART RATE: 66 BPM | DIASTOLIC BLOOD PRESSURE: 70 MMHG | WEIGHT: 181.6 LBS | BODY MASS INDEX: 27.52 KG/M2 | SYSTOLIC BLOOD PRESSURE: 123 MMHG | HEIGHT: 68 IN

## 2019-04-10 DIAGNOSIS — E11.59 TYPE 2 DIABETES MELLITUS WITH OTHER CIRCULATORY COMPLICATION, WITH LONG-TERM CURRENT USE OF INSULIN (HCC): ICD-10-CM

## 2019-04-10 DIAGNOSIS — I25.10 CORONARY ARTERY DISEASE INVOLVING NATIVE CORONARY ARTERY OF NATIVE HEART WITHOUT ANGINA PECTORIS: Primary | ICD-10-CM

## 2019-04-10 DIAGNOSIS — R53.81 PHYSICAL DEBILITY: ICD-10-CM

## 2019-04-10 DIAGNOSIS — Z86.73 HISTORY OF STROKE: ICD-10-CM

## 2019-04-10 DIAGNOSIS — Z79.4 TYPE 2 DIABETES MELLITUS WITH OTHER CIRCULATORY COMPLICATION, WITH LONG-TERM CURRENT USE OF INSULIN (HCC): ICD-10-CM

## 2019-04-10 DIAGNOSIS — I50.22 CHRONIC SYSTOLIC HEART FAILURE (HCC): ICD-10-CM

## 2019-04-10 PROCEDURE — 99024 POSTOP FOLLOW-UP VISIT: CPT | Performed by: NURSE PRACTITIONER

## 2019-04-24 ENCOUNTER — HOSPITAL ENCOUNTER (OUTPATIENT)
Dept: CARDIOLOGY | Facility: HOSPITAL | Age: 74
Discharge: HOME | End: 2019-04-24
Admitting: NURSE PRACTITIONER

## 2019-04-24 VITALS
BODY MASS INDEX: 27.53 KG/M2 | HEIGHT: 68 IN | WEIGHT: 181.66 LBS | SYSTOLIC BLOOD PRESSURE: 142 MMHG | DIASTOLIC BLOOD PRESSURE: 72 MMHG

## 2019-04-24 DIAGNOSIS — I25.10 CORONARY ARTERY DISEASE INVOLVING NATIVE CORONARY ARTERY OF NATIVE HEART WITHOUT ANGINA PECTORIS: ICD-10-CM

## 2019-04-24 DIAGNOSIS — I50.22 CHRONIC SYSTOLIC HEART FAILURE (HCC): ICD-10-CM

## 2019-04-24 PROCEDURE — 93321 DOPPLER ECHO F-UP/LMTD STD: CPT

## 2019-04-24 PROCEDURE — 93308 TTE F-UP OR LMTD: CPT | Performed by: INTERNAL MEDICINE

## 2019-04-24 PROCEDURE — 93321 DOPPLER ECHO F-UP/LMTD STD: CPT | Performed by: INTERNAL MEDICINE

## 2019-04-24 PROCEDURE — 93308 TTE F-UP OR LMTD: CPT

## 2019-04-24 PROCEDURE — 93325 DOPPLER ECHO COLOR FLOW MAPG: CPT

## 2019-04-24 PROCEDURE — 25010000002 PERFLUTREN 6.52 MG/ML SUSPENSION: Performed by: INTERNAL MEDICINE

## 2019-04-24 PROCEDURE — 93325 DOPPLER ECHO COLOR FLOW MAPG: CPT | Performed by: INTERNAL MEDICINE

## 2019-04-24 RX ADMIN — PERFLUTREN 8.48 MG: 6.52 INJECTION, SUSPENSION INTRAVENOUS at 11:44

## 2019-04-26 LAB
BH CV ECHO MEAS - ASC AORTA: 3.6 CM
BH CV ECHO MEAS - BSA(HAYCOCK): 2 M^2
BH CV ECHO MEAS - BSA: 2 M^2
BH CV ECHO MEAS - BZI_BMI: 27.5 KILOGRAMS/M^2
BH CV ECHO MEAS - BZI_METRIC_HEIGHT: 172.7 CM
BH CV ECHO MEAS - BZI_METRIC_WEIGHT: 82.1 KG
BH CV ECHO MEAS - EDV(CUBED): 103.2 ML
BH CV ECHO MEAS - EDV(MOD-SP4): 193 ML
BH CV ECHO MEAS - EDV(TEICH): 101.9 ML
BH CV ECHO MEAS - EF(CUBED): 38 %
BH CV ECHO MEAS - EF(MOD-SP4): 51.8 %
BH CV ECHO MEAS - EF(TEICH): 31.3 %
BH CV ECHO MEAS - ESV(CUBED): 64 ML
BH CV ECHO MEAS - ESV(MOD-SP4): 93 ML
BH CV ECHO MEAS - ESV(TEICH): 70 ML
BH CV ECHO MEAS - FS: 14.7 %
BH CV ECHO MEAS - IVS/LVPW: 1.4
BH CV ECHO MEAS - IVSD: 1.5 CM
BH CV ECHO MEAS - LA DIMENSION: 3.5 CM
BH CV ECHO MEAS - LAT PEAK E' VEL: 7.6 CM/SEC
BH CV ECHO MEAS - LV DIASTOLIC VOL/BSA (35-75): 98.5 ML/M^2
BH CV ECHO MEAS - LV MASS(C)D: 235.8 GRAMS
BH CV ECHO MEAS - LV MASS(C)DI: 120.4 GRAMS/M^2
BH CV ECHO MEAS - LV SYSTOLIC VOL/BSA (12-30): 47.5 ML/M^2
BH CV ECHO MEAS - LVIDD: 4.7 CM
BH CV ECHO MEAS - LVIDS: 4 CM
BH CV ECHO MEAS - LVLD AP4: 10.4 CM
BH CV ECHO MEAS - LVLS AP4: 9.2 CM
BH CV ECHO MEAS - LVPWD: 1.1 CM
BH CV ECHO MEAS - MED PEAK E' VEL: 9.3 CM/SEC
BH CV ECHO MEAS - MV A MAX VEL: 42.7 CM/SEC
BH CV ECHO MEAS - MV DEC TIME: 0.34 SEC
BH CV ECHO MEAS - MV E MAX VEL: 58.7 CM/SEC
BH CV ECHO MEAS - MV E/A: 1.4
BH CV ECHO MEAS - SI(CUBED): 20 ML/M^2
BH CV ECHO MEAS - SI(MOD-SP4): 51 ML/M^2
BH CV ECHO MEAS - SI(TEICH): 16.3 ML/M^2
BH CV ECHO MEAS - SV(CUBED): 39.2 ML
BH CV ECHO MEAS - SV(MOD-SP4): 100 ML
BH CV ECHO MEAS - SV(TEICH): 31.9 ML
BH CV ECHO MEASUREMENTS AVERAGE E/E' RATIO: 6.95
LV EF 2D ECHO EST: 40 %
MAXIMAL PREDICTED HEART RATE: 148 BPM
STRESS TARGET HR: 126 BPM

## 2019-04-30 ENCOUNTER — OFFICE VISIT (OUTPATIENT)
Dept: CARDIAC SURGERY | Facility: CLINIC | Age: 74
End: 2019-04-30

## 2019-04-30 VITALS
OXYGEN SATURATION: 100 % | BODY MASS INDEX: 29.86 KG/M2 | DIASTOLIC BLOOD PRESSURE: 64 MMHG | HEIGHT: 68 IN | WEIGHT: 197 LBS | SYSTOLIC BLOOD PRESSURE: 132 MMHG | HEART RATE: 68 BPM

## 2019-04-30 DIAGNOSIS — I25.10 CORONARY ARTERY DISEASE INVOLVING NATIVE CORONARY ARTERY OF NATIVE HEART WITHOUT ANGINA PECTORIS: ICD-10-CM

## 2019-04-30 DIAGNOSIS — I50.22 CHRONIC SYSTOLIC HEART FAILURE (HCC): Primary | ICD-10-CM

## 2019-04-30 PROCEDURE — 99024 POSTOP FOLLOW-UP VISIT: CPT | Performed by: THORACIC SURGERY (CARDIOTHORACIC VASCULAR SURGERY)

## 2019-04-30 RX ORDER — AMIODARONE HYDROCHLORIDE 200 MG/1
TABLET ORAL
COMMUNITY
Start: 2019-04-10

## 2019-05-29 NOTE — PROGRESS NOTES
"Subjective   Patient ID: Gerson Wilhelm is a 73 y.o. male who is here for follow-up having had Cardiac catheterization performed on 3/11/2019 by Dr. Obrien; Coronary artery bypass grafting-2 vessel (left internal mammary artery/left anterior descending and reverse saphenous vein graft/diagonal artery) performed on 3/14/2019            History of Present Illness  Post operative recovery was unevent.  Sleep habits are back to baseline.  Pain control has been good.    No fevers/sweats/chills.   No drainage from incisions.  No sternal clicks.  No chest pain or shortness of breath.   His appetite is well.  He is ambulating with improvement after his recent stint with rehabilitation..  He is a non-smoker.  His glycemic control has been good.  He has much more energy now and his wife reports that her  is back with his newfound energy.      The following portions of the patient's history were reviewed and updated as appropriate: allergies, current medications, past family history, past medical history, past social history, past surgical history and problem list.        Objective   Visit Vitals  /64 (BP Location: Left arm, Patient Position: Sitting, Cuff Size: Adult)   Pulse 68   Ht 172.7 cm (67.99\")   Wt 89.4 kg (197 lb)   SpO2 100%   BMI 29.96 kg/m²       Physical Exam   Constitutional: He is oriented to person, place, and time. He appears well-developed.   HENT:   Head: Normocephalic and atraumatic.   Mouth/Throat: Oropharynx is clear and moist.   Eyes: EOM are normal. Pupils are equal, round, and reactive to light.   Neck: Normal range of motion. Neck supple. No JVD present. No tracheal deviation present. No thyromegaly present.   Cardiovascular: Normal rate, regular rhythm, normal heart sounds and intact distal pulses. Exam reveals no gallop and no friction rub.   No murmur heard.  Pulmonary/Chest: Effort normal and breath sounds normal. No respiratory distress. He has no wheezes. He has no rales. He " exhibits no tenderness.   The sternum is stable. No clicks.  The sternotomy incision is C/D/I.       Abdominal: Soft. He exhibits no distension. There is no tenderness.   Musculoskeletal: Normal range of motion. He exhibits no edema.   Right saphenectomy incisions are healing nicely.       Lymphadenopathy:     He has no cervical adenopathy.   Neurological: He is alert and oriented to person, place, and time. No cranial nerve deficit.   Baseline neurologic exam with noted preop deficits in place.   Skin: Skin is warm and dry.   Psychiatric: He has a normal mood and affect.           Assessment/Plan       Gerson was seen today for post-op follow-up.    Diagnoses and all orders for this visit:    Chronic systolic heart failure (CMS/HCC)  -     Ambulatory Referral to Cardiac Rehab    Coronary artery disease involving native coronary artery of native heart without angina pectoris  -     Ambulatory Referral to Cardiac Rehab        Overall, Gerson Wilhelm is doing well.  We discussed sternotomy precautions and how they will evolve over the next few months.   Dietary education was provided today and all questions were answered.  We discussed this is not a diet but rather a change in lifestyle.  I have congratulated Gerson Wilhelm on his smoking cessation.  Patient has been cleared to begin cardiac rehabilitation.  Patient has followup with Dr. Obrien in a few weeks.    Although I have not provided a specific follow up appointment, should I be of further assistance, please do not hesitate to contact us.        He is a non-smoker.    Patient's Body mass index is 29.96 kg/m². BMI is within normal parameters. No follow-up required..

## 2019-09-29 ENCOUNTER — HOSPITAL ENCOUNTER (OUTPATIENT)
Facility: HOSPITAL | Age: 74
Setting detail: OBSERVATION
LOS: 1 days | Discharge: HOME | End: 2019-09-30
Attending: FAMILY MEDICINE | Admitting: INTERNAL MEDICINE

## 2019-09-29 DIAGNOSIS — R77.8 ELEVATED TROPONIN: ICD-10-CM

## 2019-09-29 DIAGNOSIS — R07.9 CHEST PAIN, UNSPECIFIED TYPE: Primary | ICD-10-CM

## 2019-09-29 PROBLEM — I25.110 CORONARY ARTERY DISEASE INVOLVING NATIVE CORONARY ARTERY OF NATIVE HEART WITH UNSTABLE ANGINA PECTORIS (HCC): Status: ACTIVE | Noted: 2019-03-11

## 2019-09-29 PROBLEM — I16.9 HYPERTENSIVE CRISIS: Status: ACTIVE | Noted: 2019-09-29

## 2019-09-29 LAB
GLUCOSE BLDC GLUCOMTR-MCNC: 235 MG/DL (ref 70–130)
GLUCOSE BLDC GLUCOMTR-MCNC: 97 MG/DL (ref 70–130)
HOLD SPECIMEN: NORMAL
TROPONIN T SERPL-MCNC: <0.01 NG/ML (ref 0–0.03)
WHOLE BLOOD HOLD SPECIMEN: NORMAL
WHOLE BLOOD HOLD SPECIMEN: NORMAL

## 2019-09-29 PROCEDURE — 93005 ELECTROCARDIOGRAM TRACING: CPT | Performed by: FAMILY MEDICINE

## 2019-09-29 PROCEDURE — 99284 EMERGENCY DEPT VISIT MOD MDM: CPT

## 2019-09-29 PROCEDURE — 96372 THER/PROPH/DIAG INJ SC/IM: CPT

## 2019-09-29 PROCEDURE — 93010 ELECTROCARDIOGRAM REPORT: CPT | Performed by: INTERNAL MEDICINE

## 2019-09-29 PROCEDURE — G0378 HOSPITAL OBSERVATION PER HR: HCPCS

## 2019-09-29 PROCEDURE — 84484 ASSAY OF TROPONIN QUANT: CPT | Performed by: INTERNAL MEDICINE

## 2019-09-29 PROCEDURE — 93005 ELECTROCARDIOGRAM TRACING: CPT

## 2019-09-29 PROCEDURE — 63710000001 INSULIN DETEMIR PER 5 UNITS: Performed by: INTERNAL MEDICINE

## 2019-09-29 PROCEDURE — 82962 GLUCOSE BLOOD TEST: CPT

## 2019-09-29 PROCEDURE — 99220 PR INITIAL OBSERVATION CARE/DAY 70 MINUTES: CPT | Performed by: INTERNAL MEDICINE

## 2019-09-29 PROCEDURE — 84484 ASSAY OF TROPONIN QUANT: CPT | Performed by: FAMILY MEDICINE

## 2019-09-29 PROCEDURE — 93005 ELECTROCARDIOGRAM TRACING: CPT | Performed by: INTERNAL MEDICINE

## 2019-09-29 PROCEDURE — 25010000002 ENOXAPARIN PER 10 MG: Performed by: INTERNAL MEDICINE

## 2019-09-29 RX ORDER — FUROSEMIDE 40 MG/1
40 TABLET ORAL DAILY
Status: DISCONTINUED | OUTPATIENT
Start: 2019-09-30 | End: 2019-09-30 | Stop reason: HOSPADM

## 2019-09-29 RX ORDER — SODIUM CHLORIDE 0.9 % (FLUSH) 0.9 %
10 SYRINGE (ML) INJECTION EVERY 12 HOURS SCHEDULED
Status: DISCONTINUED | OUTPATIENT
Start: 2019-09-29 | End: 2019-09-30 | Stop reason: HOSPADM

## 2019-09-29 RX ORDER — SODIUM CHLORIDE 0.9 % (FLUSH) 0.9 %
10 SYRINGE (ML) INJECTION AS NEEDED
Status: DISCONTINUED | OUTPATIENT
Start: 2019-09-29 | End: 2019-09-30 | Stop reason: HOSPADM

## 2019-09-29 RX ORDER — ESCITALOPRAM OXALATE 10 MG/1
20 TABLET ORAL DAILY
Status: DISCONTINUED | OUTPATIENT
Start: 2019-09-30 | End: 2019-09-30 | Stop reason: HOSPADM

## 2019-09-29 RX ORDER — CLOPIDOGREL BISULFATE 75 MG/1
75 TABLET ORAL DAILY
Status: DISCONTINUED | OUTPATIENT
Start: 2019-09-29 | End: 2019-09-29

## 2019-09-29 RX ORDER — RAMIPRIL 5 MG/1
10 CAPSULE ORAL DAILY
Status: DISCONTINUED | OUTPATIENT
Start: 2019-09-30 | End: 2019-09-30 | Stop reason: HOSPADM

## 2019-09-29 RX ORDER — METOPROLOL TARTRATE 50 MG/1
50 TABLET, FILM COATED ORAL EVERY 12 HOURS SCHEDULED
Status: DISCONTINUED | OUTPATIENT
Start: 2019-09-29 | End: 2019-09-29

## 2019-09-29 RX ORDER — CLOPIDOGREL BISULFATE 75 MG/1
75 TABLET ORAL DAILY
Status: DISCONTINUED | OUTPATIENT
Start: 2019-09-30 | End: 2019-09-30 | Stop reason: HOSPADM

## 2019-09-29 RX ORDER — ESCITALOPRAM OXALATE 10 MG/1
20 TABLET ORAL DAILY
Status: DISCONTINUED | OUTPATIENT
Start: 2019-09-29 | End: 2019-09-29

## 2019-09-29 RX ORDER — CARVEDILOL 6.25 MG/1
6.25 TABLET ORAL 2 TIMES DAILY WITH MEALS
Status: DISCONTINUED | OUTPATIENT
Start: 2019-09-29 | End: 2019-09-30 | Stop reason: HOSPADM

## 2019-09-29 RX ORDER — ASPIRIN 81 MG/1
81 TABLET ORAL DAILY
Status: DISCONTINUED | OUTPATIENT
Start: 2019-09-29 | End: 2019-09-29

## 2019-09-29 RX ORDER — FERROUS SULFATE 325(65) MG
325 TABLET ORAL 2 TIMES DAILY
Status: DISCONTINUED | OUTPATIENT
Start: 2019-09-29 | End: 2019-09-30 | Stop reason: HOSPADM

## 2019-09-29 RX ORDER — AMLODIPINE BESYLATE 10 MG/1
10 TABLET ORAL DAILY
COMMUNITY

## 2019-09-29 RX ORDER — ASPIRIN 81 MG/1
81 TABLET ORAL DAILY
Status: DISCONTINUED | OUTPATIENT
Start: 2019-09-30 | End: 2019-09-30 | Stop reason: HOSPADM

## 2019-09-29 RX ORDER — ATORVASTATIN CALCIUM 10 MG/1
20 TABLET, FILM COATED ORAL NIGHTLY
Status: DISCONTINUED | OUTPATIENT
Start: 2019-09-29 | End: 2019-09-30 | Stop reason: HOSPADM

## 2019-09-29 RX ORDER — FUROSEMIDE 40 MG/1
40 TABLET ORAL DAILY
Status: DISCONTINUED | OUTPATIENT
Start: 2019-09-29 | End: 2019-09-29

## 2019-09-29 RX ORDER — AMLODIPINE BESYLATE 10 MG/1
10 TABLET ORAL DAILY
Status: DISCONTINUED | OUTPATIENT
Start: 2019-09-29 | End: 2019-09-29

## 2019-09-29 RX ORDER — RAMIPRIL 5 MG/1
10 CAPSULE ORAL DAILY
Status: DISCONTINUED | OUTPATIENT
Start: 2019-09-29 | End: 2019-09-29

## 2019-09-29 RX ADMIN — SODIUM CHLORIDE, PRESERVATIVE FREE 10 ML: 5 INJECTION INTRAVENOUS at 20:11

## 2019-09-29 RX ADMIN — AMLODIPINE BESYLATE 10 MG: 10 TABLET ORAL at 15:35

## 2019-09-29 RX ADMIN — ATORVASTATIN CALCIUM 20 MG: 10 TABLET, FILM COATED ORAL at 20:11

## 2019-09-29 RX ADMIN — FERROUS SULFATE TAB 325 MG (65 MG ELEMENTAL FE) 325 MG: 325 (65 FE) TAB at 20:11

## 2019-09-29 RX ADMIN — INSULIN DETEMIR 34 UNITS: 100 INJECTION, SOLUTION SUBCUTANEOUS at 20:11

## 2019-09-29 RX ADMIN — CARVEDILOL 6.25 MG: 6.25 TABLET, FILM COATED ORAL at 17:20

## 2019-09-29 RX ADMIN — ENOXAPARIN SODIUM 40 MG: 40 INJECTION SUBCUTANEOUS at 17:20

## 2019-09-30 ENCOUNTER — APPOINTMENT (OUTPATIENT)
Dept: CARDIOLOGY | Facility: HOSPITAL | Age: 74
End: 2019-09-30

## 2019-09-30 VITALS
HEART RATE: 87 BPM | SYSTOLIC BLOOD PRESSURE: 162 MMHG | RESPIRATION RATE: 18 BRPM | TEMPERATURE: 98.3 F | OXYGEN SATURATION: 98 % | HEIGHT: 70 IN | BODY MASS INDEX: 29.42 KG/M2 | WEIGHT: 205.47 LBS | DIASTOLIC BLOOD PRESSURE: 81 MMHG

## 2019-09-30 LAB
ANION GAP SERPL CALCULATED.3IONS-SCNC: 12 MMOL/L (ref 5–15)
BH CV ECHO MEAS - AO MAX PG (FULL): 6.1 MMHG
BH CV ECHO MEAS - AO MAX PG: 8.4 MMHG
BH CV ECHO MEAS - AO MEAN PG (FULL): 3 MMHG
BH CV ECHO MEAS - AO MEAN PG: 5 MMHG
BH CV ECHO MEAS - AO ROOT AREA (BSA CORRECTED): 1.5
BH CV ECHO MEAS - AO ROOT AREA: 7.5 CM^2
BH CV ECHO MEAS - AO ROOT DIAM: 3.1 CM
BH CV ECHO MEAS - AO V2 MAX: 145 CM/SEC
BH CV ECHO MEAS - AO V2 MEAN: 97.7 CM/SEC
BH CV ECHO MEAS - AO V2 VTI: 30.7 CM
BH CV ECHO MEAS - AVA(I,A): 2.2 CM^2
BH CV ECHO MEAS - AVA(I,D): 2.2 CM^2
BH CV ECHO MEAS - AVA(V,A): 2 CM^2
BH CV ECHO MEAS - AVA(V,D): 2 CM^2
BH CV ECHO MEAS - BSA(HAYCOCK): 2.2 M^2
BH CV ECHO MEAS - BSA: 2.1 M^2
BH CV ECHO MEAS - BZI_BMI: 29.4 KILOGRAMS/M^2
BH CV ECHO MEAS - BZI_METRIC_HEIGHT: 177.8 CM
BH CV ECHO MEAS - BZI_METRIC_WEIGHT: 93 KG
BH CV ECHO MEAS - EDV(MOD-SP4): 130 ML
BH CV ECHO MEAS - EF(MOD-SP4): 42.6 %
BH CV ECHO MEAS - ESV(MOD-SP4): 74.6 ML
BH CV ECHO MEAS - IVSD: 1.2 CM
BH CV ECHO MEAS - LV DIASTOLIC VOL/BSA (35-75): 61.6 ML/M^2
BH CV ECHO MEAS - LV MAX PG: 2.3 MMHG
BH CV ECHO MEAS - LV MEAN PG: 2 MMHG
BH CV ECHO MEAS - LV SYSTOLIC VOL/BSA (12-30): 35.4 ML/M^2
BH CV ECHO MEAS - LV V1 MAX: 75.4 CM/SEC
BH CV ECHO MEAS - LV V1 MEAN: 59.9 CM/SEC
BH CV ECHO MEAS - LV V1 VTI: 17.6 CM
BH CV ECHO MEAS - LVLD AP4: 8.4 CM
BH CV ECHO MEAS - LVLS AP4: 7.2 CM
BH CV ECHO MEAS - LVOT AREA (M): 3.8 CM^2
BH CV ECHO MEAS - LVOT AREA: 3.8 CM^2
BH CV ECHO MEAS - LVOT DIAM: 2.2 CM
BH CV ECHO MEAS - LVPWD: 1.2 CM
BH CV ECHO MEAS - MV A MAX VEL: 86.8 CM/SEC
BH CV ECHO MEAS - MV DEC TIME: 0.29 SEC
BH CV ECHO MEAS - MV E MAX VEL: 48 CM/SEC
BH CV ECHO MEAS - MV E/A: 0.55
BH CV ECHO MEAS - SI(AO): 109.9 ML/M^2
BH CV ECHO MEAS - SI(LVOT): 31.7 ML/M^2
BH CV ECHO MEAS - SI(MOD-SP4): 26.3 ML/M^2
BH CV ECHO MEAS - SV(AO): 231.7 ML
BH CV ECHO MEAS - SV(LVOT): 66.9 ML
BH CV ECHO MEAS - SV(MOD-SP4): 55.4 ML
BH CV ECHO MEAS - TR MAX VEL: 200 CM/SEC
BH CV STRESS BP STAGE 1: NORMAL
BH CV STRESS COMMENTS STAGE 1: NORMAL
BH CV STRESS DOSE REGADENOSON STAGE 1: 0.4
BH CV STRESS DURATION MIN STAGE 1: 0
BH CV STRESS DURATION SEC STAGE 1: 10
BH CV STRESS HR STAGE 1: 103
BH CV STRESS PROTOCOL 1: NORMAL
BH CV STRESS RECOVERY BP: NORMAL MMHG
BH CV STRESS RECOVERY HR: 108 BPM
BH CV STRESS STAGE 1: 1
BUN BLD-MCNC: 14 MG/DL (ref 8–23)
BUN/CREAT SERPL: 14.4 (ref 7–25)
CALCIUM SPEC-SCNC: 9 MG/DL (ref 8.6–10.5)
CHLORIDE SERPL-SCNC: 101 MMOL/L (ref 98–107)
CHOLEST SERPL-MCNC: 119 MG/DL (ref 0–200)
CO2 SERPL-SCNC: 28 MMOL/L (ref 22–29)
CREAT BLD-MCNC: 0.97 MG/DL (ref 0.76–1.27)
GFR SERPL CREATININE-BSD FRML MDRD: 76 ML/MIN/1.73
GLUCOSE BLD-MCNC: 308 MG/DL (ref 65–99)
HBA1C MFR BLD: 8.4 % (ref 4.8–5.6)
HDLC SERPL-MCNC: 22 MG/DL (ref 40–60)
LDLC SERPL CALC-MCNC: 44 MG/DL (ref 0–100)
LDLC/HDLC SERPL: 2.01 {RATIO}
LEFT ATRIUM VOLUME INDEX: 36.1 ML/M2
LEFT ATRIUM VOLUME: 76.2 CM3
LV EF 2D ECHO EST: 50 %
LV EF NUC BP: 50 %
MAXIMAL PREDICTED HEART RATE: 148 BPM
MAXIMAL PREDICTED HEART RATE: 148 BPM
PERCENT MAX PREDICTED HR: 69.59 %
POTASSIUM BLD-SCNC: 4.2 MMOL/L (ref 3.5–5.2)
SODIUM BLD-SCNC: 141 MMOL/L (ref 136–145)
STRESS BASELINE BP: NORMAL MMHG
STRESS BASELINE HR: 75 BPM
STRESS PERCENT HR: 82 %
STRESS POST EXERCISE DUR SEC: 10 SEC
STRESS POST PEAK BP: NORMAL MMHG
STRESS POST PEAK HR: 103 BPM
STRESS TARGET HR: 126 BPM
STRESS TARGET HR: 126 BPM
TRIGL SERPL-MCNC: 264 MG/DL (ref 0–150)
VLDLC SERPL-MCNC: 52.8 MG/DL

## 2019-09-30 PROCEDURE — 83036 HEMOGLOBIN GLYCOSYLATED A1C: CPT | Performed by: INTERNAL MEDICINE

## 2019-09-30 PROCEDURE — 93018 CV STRESS TEST I&R ONLY: CPT | Performed by: INTERNAL MEDICINE

## 2019-09-30 PROCEDURE — 93308 TTE F-UP OR LMTD: CPT | Performed by: INTERNAL MEDICINE

## 2019-09-30 PROCEDURE — G0378 HOSPITAL OBSERVATION PER HR: HCPCS

## 2019-09-30 PROCEDURE — 99217 PR OBSERVATION CARE DISCHARGE MANAGEMENT: CPT | Performed by: INTERNAL MEDICINE

## 2019-09-30 PROCEDURE — 93325 DOPPLER ECHO COLOR FLOW MAPG: CPT

## 2019-09-30 PROCEDURE — 93325 DOPPLER ECHO COLOR FLOW MAPG: CPT | Performed by: INTERNAL MEDICINE

## 2019-09-30 PROCEDURE — 93308 TTE F-UP OR LMTD: CPT

## 2019-09-30 PROCEDURE — 93321 DOPPLER ECHO F-UP/LMTD STD: CPT

## 2019-09-30 PROCEDURE — 93321 DOPPLER ECHO F-UP/LMTD STD: CPT | Performed by: INTERNAL MEDICINE

## 2019-09-30 PROCEDURE — 80061 LIPID PANEL: CPT | Performed by: INTERNAL MEDICINE

## 2019-09-30 PROCEDURE — 78452 HT MUSCLE IMAGE SPECT MULT: CPT

## 2019-09-30 PROCEDURE — 93017 CV STRESS TEST TRACING ONLY: CPT

## 2019-09-30 PROCEDURE — 25010000002 REGADENOSON 0.4 MG/5ML SOLUTION: Performed by: INTERNAL MEDICINE

## 2019-09-30 PROCEDURE — 25010000002 PERFLUTREN 6.52 MG/ML SUSPENSION: Performed by: INTERNAL MEDICINE

## 2019-09-30 PROCEDURE — 0 TECHNETIUM SESTAMIBI: Performed by: INTERNAL MEDICINE

## 2019-09-30 PROCEDURE — A9500 TC99M SESTAMIBI: HCPCS | Performed by: INTERNAL MEDICINE

## 2019-09-30 PROCEDURE — 80048 BASIC METABOLIC PNL TOTAL CA: CPT | Performed by: INTERNAL MEDICINE

## 2019-09-30 PROCEDURE — 78452 HT MUSCLE IMAGE SPECT MULT: CPT | Performed by: INTERNAL MEDICINE

## 2019-09-30 RX ORDER — NITROGLYCERIN 0.4 MG/1
TABLET SUBLINGUAL
Qty: 100 TABLET | Refills: 11 | Status: SHIPPED | OUTPATIENT
Start: 2019-09-30

## 2019-09-30 RX ADMIN — ESCITALOPRAM 20 MG: 10 TABLET, FILM COATED ORAL at 09:54

## 2019-09-30 RX ADMIN — FUROSEMIDE 40 MG: 40 TABLET ORAL at 10:54

## 2019-09-30 RX ADMIN — PERFLUTREN 8.48 MG: 6.52 INJECTION, SUSPENSION INTRAVENOUS at 07:45

## 2019-09-30 RX ADMIN — CARVEDILOL 6.25 MG: 6.25 TABLET, FILM COATED ORAL at 09:54

## 2019-09-30 RX ADMIN — SODIUM CHLORIDE, PRESERVATIVE FREE 10 ML: 5 INJECTION INTRAVENOUS at 09:53

## 2019-09-30 RX ADMIN — ASPIRIN 81 MG: 81 TABLET ORAL at 09:53

## 2019-09-30 RX ADMIN — TECHNETIUM TC 99M SESTAMIBI 1 DOSE: 1 INJECTION INTRAVENOUS at 08:10

## 2019-09-30 RX ADMIN — REGADENOSON 0.4 MG: 0.08 INJECTION, SOLUTION INTRAVENOUS at 08:56

## 2019-09-30 RX ADMIN — CLOPIDOGREL 75 MG: 75 TABLET, FILM COATED ORAL at 09:54

## 2019-09-30 RX ADMIN — FERROUS SULFATE TAB 325 MG (65 MG ELEMENTAL FE) 325 MG: 325 (65 FE) TAB at 09:54

## 2019-09-30 RX ADMIN — TECHNETIUM TC 99M SESTAMIBI 1 DOSE: 1 INJECTION INTRAVENOUS at 08:50

## 2019-09-30 RX ADMIN — RAMIPRIL 10 MG: 5 CAPSULE ORAL at 10:53

## 2019-10-01 ENCOUNTER — READMISSION MANAGEMENT (OUTPATIENT)
Dept: CALL CENTER | Facility: HOSPITAL | Age: 74
End: 2019-10-01

## 2019-10-01 NOTE — OUTREACH NOTE
Prep Survey      Responses   Facility patient discharged from?  [facility]   Is patient eligible?  Yes   Discharge diagnosis  Hypertensive crisis, CAD with unstable angina, essential HTN, PAF, chronic systolic HF   Does the patient have one of the following disease processes/diagnoses(primary or secondary)?  CHF [Chronic LACE<7]   Does the patient have Home health ordered?  No   Is there a DME ordered?  No   Comments regarding appointments  See AVS   Prep survey completed?  Yes          Saadia Reed RN

## 2019-10-02 ENCOUNTER — READMISSION MANAGEMENT (OUTPATIENT)
Dept: CALL CENTER | Facility: HOSPITAL | Age: 74
End: 2019-10-02

## 2019-10-02 NOTE — OUTREACH NOTE
CHF Week 1 Survey      Responses   Facility patient discharged from?  [facility]   Does the patient have one of the following disease processes/diagnoses(primary or secondary)?  CHF   Is there a successful TCM telephone encounter documented?  No   CHF Week 1 attempt successful?  Yes   Call start time  1533   Call end time  1537   Discharge diagnosis  Hypertensive crisis, CAD with unstable angina, essential HTN, PAF, chronic systolic HF   Is patient permission given to speak with other caregiver?  Yes   List who call center can speak with  wife   Person spoke with today (if not patient) and relationship  wife   Meds reviewed with patient/caregiver?  Yes   Is the patient having any side effects they believe may be caused by any medication additions or changes?  No   Does the patient have all medications ordered at discharge?  Yes   Is the patient taking all medications as directed (includes completed medication regime)?  Yes   Psychosocial issues?  No   Did the patient receive a copy of their discharge instructions?  Yes   Nursing interventions  Reviewed instructions with patient   What is the patient's perception of their health status since discharge?  Improving   Nursing interventions  Nurse provided patient education   Is the patient weighing daily?  No   Does the patient have scales?  Yes   Daily weight interventions  Education provided on importance of daily weight   Is the patient able to teach back Heart Failure diet management?  Yes   Is the patient able to teach back Heart Failure Zones?  Yes   Is the patient/caregiver able to teach back the hierarchy of who to call/visit for symptoms/problems? PCP, Specialist, Home health nurse, Urgent Care, ED, 911  Yes    CHF Week 1 call completed?  Yes   Revoked  No further contact(revokes)-requires comment   Graduated/Revoked comments  they need no further calls per wife          Michelle Velazquez RN

## 2020-08-25 LAB — HOLD SPECIMEN: NORMAL

## 2021-08-31 NOTE — ED NOTES
Daily Note     Today's date: 2021  Patient name: Agustin Covarrubias  : 1975  MRN: [id number]  Referring provider: Paras Ballard DO  Dx:   Encounter Diagnosis     ICD-10-CM    1  Patella, chondromalacia, right  M22 41    2  Chronic pain of right knee  M25 561     G89 29                   Subjective: Pt noted that the Kinesio tape really seems to be helping  Pt noted he has to remove it this past weekend and he realized how much it was helpful  Objective: See treatment diary below      Assessment:  Continued with treatment session, Applied Kinesio tape prior to exercise  Pt reported postive response with tape right away  Added to 8" step up  Pt noted some challenge noted " I feel it a little"  Tolerated treatment fair  Patient demonstrated fatigue post treatment, exhibited good technique with therapeutic exercises and would benefit from continued PT  S/p treatment session,  Pt noted no changed  Plan: Continue per plan of care        Precautions: none      Manuals  (IE)         Medial PFJ glides NV  JG grade IV          Kinesiotape to correct laterally tilted patella (75% tension middle third, no tension on tails) JG MARIAA JG Not needed SC                                  Neuro Re-Ed                                                                                                        Ther Ex             Upright bike 8 mins, L2 NV 10 min L2 10 mins, L4 10 mins, L4 10 min L4                      Standing:             - hip 3 way with TB 2 x 10 ea RTB NV 2x10 ea RTB 2x10 2 x 10 ea RTB 2 x 10 ea RTB 2x 10         - gastroc stretch on rockerboard 4 x 30" NV  4 x 30" 4 x 30" NV                     Seated:             - HS stretch 4 x 30" NV            - Matrix LAQ (90-30 deg) 2 x 10 NV  2 x 10 30# 2 x 10 30# 2x 10 30#                      Table:             - prone quad stretch with strap 4 x 30" NV 4x 30"  4 x 30" 4x 30"         - SLR flexion 2 x 10 NV 2x10  2  x10 2x 10 KIDNEY STONE DIAGNOSIS ONE WEEK AGO AT [facility].  PT SAW PCP ON Monday, FLOMAX INITIATED, BUT CAUSES ABDOMINAL DISCOMFORT SO HAS NOT TAKEN IT.  PT'S PCP STATES THERE WAS LIMITED HELP PCP COULD GIVE SO PT CAME TO ED.  3MM STONE WITHIN RIGHT URETER IS WHAT REPORT READS.     Marry Gonzalez, RN  11/02/18 [id number]     - bridges with TB around knee 20 x 5" RTB NV 20x 5"  20 x 5" RTB NV                                  Ther Activity             Front step ups 2 x 10 6" step NV 2x10 6" 2x10 6" 2x10 6" 2x 10 8"         Lateral step up and overs 2 x 10 6" step NV 2x10 6" 2x10 6" 2x10 6" 2x 10 8"         TG squats 2 x 10 L22 NV  2 x 10 L22 2 x 10 L22 2x 10 L22                                   Gait Training                                       Modalities             Cryo/MH as needed

## (undated) DEVICE — ANTIBACTERIAL UNDYED BRAIDED (POLYGLACTIN 910), SYNTHETIC ABSORBABLE SUTURE: Brand: COATED VICRYL

## (undated) DEVICE — PAD GRND REM POLYHESIVE A/ DISP

## (undated) DEVICE — 1/2 FORCE SURGICAL SPRING CLIP: Brand: STEALTH® SPRING CLIP

## (undated) DEVICE — CLTH CLENS READYCLEANSE PERI CARE PK/5

## (undated) DEVICE — TOOL INSRT GW MTL OR PLSTC

## (undated) DEVICE — SUT STL 2/0 CV SH 24IN TPW32

## (undated) DEVICE — SOL NACL INJ 0.9PCT 50ML

## (undated) DEVICE — INTENDED FOR TISSUE SEPARATION, AND OTHER PROCEDURES THAT REQUIRE A SHARP SURGICAL BLADE TO PUNCTURE OR CUT.: Brand: BARD-PARKER ® STAINLESS STEEL BLADES

## (undated) DEVICE — MODEL AT P65, P/N 701554-001KIT CONTENTS: HAND CONTROLLER, 3-WAY HIGH-PRESSURE STOPCOCK WITH ROTATING END AND PREMIUM HIGH-PRESSURE TUBING: Brand: ANGIOTOUCH® KIT

## (undated) DEVICE — DRSNG PRESS SAFEGUARD

## (undated) DEVICE — SUT PROLN 7/0 BV1 24IN 4PK M8702

## (undated) DEVICE — IMMOB KN 3PNL DLX CANVS 19IN BLU

## (undated) DEVICE — PINNACLE INTRODUCER SHEATH: Brand: PINNACLE

## (undated) DEVICE — BLAKE SILICONE DRAINS CARDIO CONNECTOR 2:1: Brand: BLAKE

## (undated) DEVICE — SUT PROLN 8/0 BV130/5 D/A 24IN 8732H

## (undated) DEVICE — SYS DISTNTION VEIN BONCHEK 300MMHG

## (undated) DEVICE — AORTIC PUNCHES ARE USED TO CREATE A UNIFORM OPENING IN BLOOD VESSELS DURING CARDIOVASCULAR SURGERY. THE VESSEL GRAFT IS INSERTED INTO THE CREATED OPENING AND SUTURED TO THE VESSEL WALL. AORTIC LANCETS ARE USED TO MAKE A SMALL UNIFORM CUT IN A BLOOD VESSEL TO FACILITATE INSERTION OF AN AORTIC PUNCH.  PUNCHES COME IN VARIOUS LENGTHS, DIAMETERS AND TIP CONFIGURATIONS.: Brand: CLEANCUT ROTATING AORTIC PUNCH

## (undated) DEVICE — RESERVOIR,SUCTION,100CC,SILICONE: Brand: MEDLINE

## (undated) DEVICE — 6F .070 JL4 100CM: Brand: CORDIS

## (undated) DEVICE — GLV SURG TRIUMPH MICRO PF LTX 8.5 STRL

## (undated) DEVICE — SUT PROLN 6/0 RB2 30IN 4PK M8711

## (undated) DEVICE — SOL IRR NACL 0.9PCT BT 1000ML

## (undated) DEVICE — VASOVIEW HEMOPRO: Brand: VASOVIEW HEMOPRO

## (undated) DEVICE — SUT MNCRYL 4/0 PS2 27IN UD MCP426H

## (undated) DEVICE — DEFOGGER!" ANTI FOG KIT: Brand: DEROYAL

## (undated) DEVICE — DRAIN,WOUND,ROUND,24FR,5/16",FULL-FLUTED: Brand: MEDLINE

## (undated) DEVICE — A2000 MULTI-USE SYRINGE KIT, P/N 701277-003KIT CONTENTS: 100ML CONTRAST RESERVOIR AND TUBING WITH CONTRAST SPIKE AND CLAMP: Brand: A2000 MULTI-USE SYRINGE KIT

## (undated) DEVICE — SUT ETHIB 2/0 RB1 DA 30IN WHT MX523

## (undated) DEVICE — BLD SCLPL BEAVR MINI STR 2BVL 180D LF

## (undated) DEVICE — FR5 INFINITI MULTIPAC: Brand: INFINITI

## (undated) DEVICE — HI-TORQUE POWERTURN FLEX GUIDE WIRE W/HYDROPHILIC COATING .014" STRAIGHT TIP 190 CM: Brand: HI-TORQUE POWERTURN

## (undated) DEVICE — CATH F5 INF PIG145 110CM 6SH: Brand: INFINITI

## (undated) DEVICE — MODEL BT2000 P/N 700287-012KIT CONTENTS: MANIFOLD WITH SALINE AND CONTRAST PORTS, SALINE TUBING WITH SPIKE AND HAND SYRINGE, TRANSDUCER: Brand: BT2000 AUTOMATED MANIFOLD KIT

## (undated) DEVICE — PK OPN HEART 30

## (undated) DEVICE — SUT SILK 2/0 FS BLK 18IN 685G

## (undated) DEVICE — GAUZE,SPONGE,4"X4",16PLY,XRAY,STRL,LF: Brand: MEDLINE

## (undated) DEVICE — STERNUM BLADE, OFFSET (32.0 X 0.8 X 6.3MM)

## (undated) DEVICE — PK SET UP ANES OPN HEART 30

## (undated) DEVICE — SKIN AFFIX SURG ADHESIVE 72/CS 0.55ML: Brand: MEDLINE

## (undated) DEVICE — GLV SURG BIOGEL LTX PF 6 1/2

## (undated) DEVICE — SUT SILK 1 LBYRNTH TIES 30IN A307H

## (undated) DEVICE — SUT SILK 2/0 SUTUPAK TIES 24IN SA75H

## (undated) DEVICE — PK TURNOVER RM ADV

## (undated) DEVICE — MYNXGRIP 6F/7F: Brand: MYNXGRIP

## (undated) DEVICE — ELECTRD PAD DEFIB A/

## (undated) DEVICE — ST CATH CV 5FR

## (undated) DEVICE — DEV TORQ GW HOT/PINK

## (undated) DEVICE — GW STARTER FXD CORE J .035 3X150CM 3MM

## (undated) DEVICE — BLAKE SILICONE DRAIN, 19 FR ROUND, HUBLESS WITH 1/4" BENDABLE TROCAR: Brand: BLAKE

## (undated) DEVICE — GW PRESSUREWIRE X WIRELESS FFR 175CM

## (undated) DEVICE — PK CATH CARD 30 CA/4

## (undated) DEVICE — CANN CO2/O2 NASL A/

## (undated) DEVICE — GW FC J TFE/COAT .035 3MM 50CM

## (undated) DEVICE — GUIDELINER CATHETERS ARE INTENDED TO BE USED IN CONJUNCTION WITH GUIDE CATHETERS TO ACCESS DISCRETE REGIONS OF THE CORONARY AND/OR PERIPHERAL VASCULATURE, AND TO FACILITATE PLACEMENT OF INTERVENTIONAL DEVICES.: Brand: GUIDELINER® V3 CATHETER

## (undated) DEVICE — SUT PROLN 4/0 RB1 36IN 4PK M8557

## (undated) DEVICE — 6 FOOT DISPOSABLE EXTENSION CABLE WITH SAFE CONNECT / SCREW-DOWN

## (undated) DEVICE — GLV SURG BIOGEL LTX PF 7 1/2

## (undated) DEVICE — SPONGE,LAP,12"X12",XR,ST,5/PK,40PK/CS: Brand: MEDLINE

## (undated) DEVICE — SUT SILK 4/0 SUTUPAK TIES 24IN SA73H

## (undated) DEVICE — E-PACK PROCEDURE KIT: Brand: E-PACK

## (undated) DEVICE — SOLIDIFIER LIQUI LOC PLUS 2000CC

## (undated) DEVICE — INFLATION DEVICE: Brand: ENCORE™ 26

## (undated) DEVICE — STRP TABL UNIV 96X6IN

## (undated) DEVICE — COPILOT BLEEDBACK CONTROL VALVE: Brand: COPILOT

## (undated) DEVICE — SUT ETHIB 2/0 SH SH 36IN X523H

## (undated) DEVICE — SUT PDS LP 0 CTX VIO 60IN Z990G